# Patient Record
Sex: MALE | Race: WHITE | NOT HISPANIC OR LATINO | Employment: OTHER | URBAN - METROPOLITAN AREA
[De-identification: names, ages, dates, MRNs, and addresses within clinical notes are randomized per-mention and may not be internally consistent; named-entity substitution may affect disease eponyms.]

---

## 2021-07-01 ENCOUNTER — PATIENT MESSAGE (OUTPATIENT)
Dept: ADMINISTRATIVE | Facility: OTHER | Age: 86
End: 2021-07-01

## 2021-10-17 PROBLEM — M48.062 SPINAL STENOSIS, LUMBAR REGION WITH NEUROGENIC CLAUDICATION: Status: ACTIVE | Noted: 2021-10-17

## 2021-11-18 DIAGNOSIS — R94.31 NONSPECIFIC ABNORMAL ELECTROCARDIOGRAM (ECG) (EKG): Primary | ICD-10-CM

## 2021-11-24 ENCOUNTER — HOSPITAL ENCOUNTER (OUTPATIENT)
Dept: RADIOLOGY | Facility: HOSPITAL | Age: 86
Discharge: HOME | End: 2021-11-24
Attending: INTERNAL MEDICINE
Payer: MEDICARE

## 2021-11-24 ENCOUNTER — CLINICAL SUPPORT (OUTPATIENT)
Dept: CARDIOLOGY | Facility: HOSPITAL | Age: 86
End: 2021-11-24
Attending: INTERNAL MEDICINE
Payer: MEDICARE

## 2021-11-24 DIAGNOSIS — R94.31 NONSPECIFIC ABNORMAL ELECTROCARDIOGRAM (ECG) (EKG): ICD-10-CM

## 2021-11-24 PROCEDURE — 78452 HT MUSCLE IMAGE SPECT MULT: CPT

## 2021-11-24 PROCEDURE — 93017 CV STRESS TEST TRACING ONLY: CPT

## 2021-11-24 PROCEDURE — A9500 TC99M SESTAMIBI: HCPCS

## 2021-12-02 LAB
CV STRESS BASE HR: 56 BPM
DIASTOLIC BLOOD PRESSURE: 65 MMHG
NUC REST EJECTION FRACTION: 67
OHS CV CPX 85 PERCENT MAX PREDICTED HEART RATE MALE: 109
OHS CV CPX MAX PREDICTED HEART RATE: 128
OHS CV CPX PATIENT IS FEMALE: 0
OHS CV CPX PATIENT IS MALE: 1
OHS CV CPX PEAK HEAR RATE: 79 BPM
OHS CV CPX PERCENT MAX PREDICTED HEART RATE ACHIEVED: 62
OHS CV CPX RATE PRESSURE PRODUCT PRESENTING: 9800
SYSTOLIC BLOOD PRESSURE: 175 MMHG

## 2022-01-01 ENCOUNTER — HOSPITAL ENCOUNTER (OUTPATIENT)
Dept: RADIOLOGY | Facility: HOSPITAL | Age: 87
Discharge: HOME | End: 2022-09-09
Attending: NURSE PRACTITIONER
Payer: MEDICARE

## 2022-01-01 ENCOUNTER — LAB VISIT (OUTPATIENT)
Dept: LAB | Facility: HOSPITAL | Age: 87
End: 2022-01-01
Attending: INTERNAL MEDICINE
Payer: MEDICARE

## 2022-01-01 DIAGNOSIS — N18.9 CHRONIC KIDNEY DISEASE, UNSPECIFIED: Primary | ICD-10-CM

## 2022-01-01 DIAGNOSIS — R22.31 MASS OF FINGER OF RIGHT HAND: Primary | ICD-10-CM

## 2022-01-01 DIAGNOSIS — R22.31 MASS OF FINGER OF RIGHT HAND: ICD-10-CM

## 2022-01-01 DIAGNOSIS — M79.601 RIGHT ARM PAIN: ICD-10-CM

## 2022-01-01 DIAGNOSIS — R22.31 LOCALIZED SWELLING, MASS, OR LUMP OF UPPER EXTREMITY, RIGHT: ICD-10-CM

## 2022-01-01 LAB
ALBUMIN SERPL BCP-MCNC: 2.9 G/DL (ref 3.5–5.2)
ALP SERPL-CCNC: 35 U/L (ref 55–135)
ALT SERPL W/O P-5'-P-CCNC: 14 U/L (ref 10–44)
ANION GAP SERPL CALC-SCNC: 6 MMOL/L (ref 8–16)
AST SERPL-CCNC: 25 U/L (ref 10–40)
BASOPHILS # BLD AUTO: 0.06 K/UL (ref 0–0.2)
BASOPHILS NFR BLD: 0.9 % (ref 0–1.9)
BILIRUB SERPL-MCNC: 0.3 MG/DL (ref 0.1–1)
BUN SERPL-MCNC: 34 MG/DL (ref 10–30)
CALCIUM SERPL-MCNC: 8.6 MG/DL (ref 8.7–10.5)
CHLORIDE SERPL-SCNC: 101 MMOL/L (ref 95–110)
CHOLEST SERPL-MCNC: 138 MG/DL (ref 120–199)
CHOLEST/HDLC SERPL: 3.5 {RATIO} (ref 2–5)
CO2 SERPL-SCNC: 26 MMOL/L (ref 23–29)
COMPLEXED PSA SERPL-MCNC: 23 NG/ML (ref 0–4)
CREAT SERPL-MCNC: 2.3 MG/DL (ref 0.5–1.4)
DIFFERENTIAL METHOD: ABNORMAL
EOSINOPHIL # BLD AUTO: 0.3 K/UL (ref 0–0.5)
EOSINOPHIL NFR BLD: 4.9 % (ref 0–8)
ERYTHROCYTE [DISTWIDTH] IN BLOOD BY AUTOMATED COUNT: 13.5 % (ref 11.5–14.5)
EST. GFR  (NO RACE VARIABLE): 25.8 ML/MIN/1.73 M^2
GLUCOSE SERPL-MCNC: 113 MG/DL (ref 70–110)
HCT VFR BLD AUTO: 29.2 % (ref 40–54)
HDLC SERPL-MCNC: 40 MG/DL (ref 40–75)
HDLC SERPL: 29 % (ref 20–50)
HGB BLD-MCNC: 9.9 G/DL (ref 14–18)
IMM GRANULOCYTES # BLD AUTO: 0.01 K/UL (ref 0–0.04)
IMM GRANULOCYTES NFR BLD AUTO: 0.1 % (ref 0–0.5)
LDLC SERPL CALC-MCNC: 79.6 MG/DL (ref 63–159)
LYMPHOCYTES # BLD AUTO: 3.3 K/UL (ref 1–4.8)
LYMPHOCYTES NFR BLD: 49.3 % (ref 18–48)
MCH RBC QN AUTO: 30.5 PG (ref 27–31)
MCHC RBC AUTO-ENTMCNC: 33.9 G/DL (ref 32–36)
MCV RBC AUTO: 90 FL (ref 82–98)
MONOCYTES # BLD AUTO: 0.9 K/UL (ref 0.3–1)
MONOCYTES NFR BLD: 12.6 % (ref 4–15)
NEUTROPHILS # BLD AUTO: 2.2 K/UL (ref 1.8–7.7)
NEUTROPHILS NFR BLD: 32.2 % (ref 38–73)
NONHDLC SERPL-MCNC: 98 MG/DL
NRBC BLD-RTO: 0 /100 WBC
PLATELET # BLD AUTO: 274 K/UL (ref 150–450)
PMV BLD AUTO: 10.6 FL (ref 9.2–12.9)
POTASSIUM SERPL-SCNC: 4.4 MMOL/L (ref 3.5–5.1)
PROT SERPL-MCNC: 6.1 G/DL (ref 6–8.4)
RBC # BLD AUTO: 3.25 M/UL (ref 4.6–6.2)
SODIUM SERPL-SCNC: 133 MMOL/L (ref 136–145)
TRIGL SERPL-MCNC: 92 MG/DL (ref 30–150)
WBC # BLD AUTO: 6.76 K/UL (ref 3.9–12.7)

## 2022-01-01 PROCEDURE — 93971 EXTREMITY STUDY: CPT | Mod: TC,RT

## 2022-01-01 PROCEDURE — 84153 ASSAY OF PSA TOTAL: CPT | Mod: GA | Performed by: INTERNAL MEDICINE

## 2022-01-01 PROCEDURE — 85025 COMPLETE CBC W/AUTO DIFF WBC: CPT | Performed by: INTERNAL MEDICINE

## 2022-01-01 PROCEDURE — 80053 COMPREHEN METABOLIC PANEL: CPT | Performed by: INTERNAL MEDICINE

## 2022-01-01 PROCEDURE — 36415 COLL VENOUS BLD VENIPUNCTURE: CPT | Performed by: INTERNAL MEDICINE

## 2022-01-01 PROCEDURE — 80061 LIPID PANEL: CPT | Performed by: INTERNAL MEDICINE

## 2022-02-17 DIAGNOSIS — H71.90: Primary | ICD-10-CM

## 2022-02-18 ENCOUNTER — HOSPITAL ENCOUNTER (OUTPATIENT)
Dept: RADIOLOGY | Facility: HOSPITAL | Age: 87
Discharge: HOME | End: 2022-02-18
Payer: MEDICARE

## 2022-02-18 DIAGNOSIS — H71.90: ICD-10-CM

## 2022-02-18 PROCEDURE — 70480 CT ORBIT/EAR/FOSSA W/O DYE: CPT | Mod: TC

## 2022-03-17 PROBLEM — I10 HYPERTENSION: Status: ACTIVE | Noted: 2022-03-17

## 2022-03-20 PROBLEM — I44.1 WENCKEBACH'S PHENOMENON, HEART BLOCK: Status: ACTIVE | Noted: 2022-03-20

## 2022-03-21 PROBLEM — E63.9 INADEQUATE DIETARY ENERGY INTAKE: Status: ACTIVE | Noted: 2022-03-21

## 2022-03-22 PROBLEM — E87.1 HYPONATREMIA: Status: ACTIVE | Noted: 2022-03-22

## 2022-03-22 PROBLEM — N17.9 ACUTE RENAL FAILURE: Status: ACTIVE | Noted: 2022-03-22

## 2022-03-25 PROBLEM — E87.5 HYPERKALEMIA: Status: ACTIVE | Noted: 2022-03-25

## 2022-03-28 ENCOUNTER — HOSPITAL ENCOUNTER (INPATIENT)
Facility: HOSPITAL | Age: 87
LOS: 15 days | Discharge: HOME HEALTH | DRG: 552 | End: 2022-04-12
Attending: INTERNAL MEDICINE | Admitting: INTERNAL MEDICINE
Payer: MEDICARE

## 2022-03-28 DIAGNOSIS — N17.9 ACUTE RENAL FAILURE, UNSPECIFIED ACUTE RENAL FAILURE TYPE: Primary | ICD-10-CM

## 2022-03-28 DIAGNOSIS — M48.062 SPINAL STENOSIS OF LUMBAR REGION WITH NEUROGENIC CLAUDICATION: ICD-10-CM

## 2022-03-28 DIAGNOSIS — M48.00 SPINAL STENOSIS: ICD-10-CM

## 2022-03-28 PROCEDURE — 11000001 HC ACUTE MED/SURG PRIVATE ROOM

## 2022-03-28 PROCEDURE — 63600175 PHARM REV CODE 636 W HCPCS: Performed by: INTERNAL MEDICINE

## 2022-03-28 PROCEDURE — 25000003 PHARM REV CODE 250: Performed by: INTERNAL MEDICINE

## 2022-03-28 PROCEDURE — 92523 SPEECH SOUND LANG COMPREHEN: CPT | Performed by: SPEECH-LANGUAGE PATHOLOGIST

## 2022-03-28 PROCEDURE — 97162 PT EVAL MOD COMPLEX 30 MIN: CPT

## 2022-03-28 RX ORDER — AMLODIPINE BESYLATE 5 MG/1
5 TABLET ORAL NIGHTLY
Status: DISCONTINUED | OUTPATIENT
Start: 2022-03-28 | End: 2022-04-12 | Stop reason: HOSPADM

## 2022-03-28 RX ORDER — POLYETHYLENE GLYCOL 3350 17 G/17G
17 POWDER, FOR SOLUTION ORAL DAILY
Status: DISCONTINUED | OUTPATIENT
Start: 2022-03-28 | End: 2022-04-12 | Stop reason: HOSPADM

## 2022-03-28 RX ORDER — ASPIRIN 81 MG/1
81 TABLET ORAL DAILY
Status: DISCONTINUED | OUTPATIENT
Start: 2022-03-28 | End: 2022-04-12 | Stop reason: HOSPADM

## 2022-03-28 RX ORDER — CHOLECALCIFEROL (VITAMIN D3) 25 MCG
1000 TABLET ORAL DAILY
Status: DISCONTINUED | OUTPATIENT
Start: 2022-03-28 | End: 2022-04-12 | Stop reason: HOSPADM

## 2022-03-28 RX ORDER — TAMSULOSIN HYDROCHLORIDE 0.4 MG/1
0.4 CAPSULE ORAL 2 TIMES DAILY
Status: DISCONTINUED | OUTPATIENT
Start: 2022-03-28 | End: 2022-04-12 | Stop reason: HOSPADM

## 2022-03-28 RX ORDER — ONDANSETRON 4 MG/1
8 TABLET, ORALLY DISINTEGRATING ORAL EVERY 8 HOURS PRN
Status: DISCONTINUED | OUTPATIENT
Start: 2022-03-28 | End: 2022-04-12 | Stop reason: HOSPADM

## 2022-03-28 RX ORDER — FENOFIBRATE 145 MG/1
145 TABLET, FILM COATED ORAL NIGHTLY
Status: DISCONTINUED | OUTPATIENT
Start: 2022-03-28 | End: 2022-04-12 | Stop reason: HOSPADM

## 2022-03-28 RX ORDER — ENOXAPARIN SODIUM 100 MG/ML
40 INJECTION SUBCUTANEOUS EVERY 24 HOURS
Status: DISCONTINUED | OUTPATIENT
Start: 2022-03-28 | End: 2022-03-28

## 2022-03-28 RX ORDER — TALC
6 POWDER (GRAM) TOPICAL NIGHTLY PRN
Status: DISCONTINUED | OUTPATIENT
Start: 2022-03-28 | End: 2022-04-12 | Stop reason: HOSPADM

## 2022-03-28 RX ORDER — HYDROCODONE BITARTRATE AND ACETAMINOPHEN 5; 325 MG/1; MG/1
1 TABLET ORAL EVERY 4 HOURS PRN
Status: DISCONTINUED | OUTPATIENT
Start: 2022-03-28 | End: 2022-04-12 | Stop reason: HOSPADM

## 2022-03-28 RX ORDER — MUPIROCIN 20 MG/G
OINTMENT TOPICAL 2 TIMES DAILY
Status: DISPENSED | OUTPATIENT
Start: 2022-03-28 | End: 2022-04-02

## 2022-03-28 RX ORDER — MIRTAZAPINE 7.5 MG/1
7.5 TABLET, FILM COATED ORAL NIGHTLY
Status: DISCONTINUED | OUTPATIENT
Start: 2022-03-28 | End: 2022-04-12 | Stop reason: HOSPADM

## 2022-03-28 RX ORDER — ENOXAPARIN SODIUM 100 MG/ML
30 INJECTION SUBCUTANEOUS EVERY 24 HOURS
Status: DISCONTINUED | OUTPATIENT
Start: 2022-03-28 | End: 2022-04-12 | Stop reason: HOSPADM

## 2022-03-28 RX ADMIN — TAMSULOSIN HYDROCHLORIDE 0.4 MG: 0.4 CAPSULE ORAL at 08:03

## 2022-03-28 RX ADMIN — MIRTAZAPINE 7.5 MG: 7.5 TABLET ORAL at 08:03

## 2022-03-28 RX ADMIN — FENOFIBRATE 145 MG: 145 TABLET, FILM COATED ORAL at 08:03

## 2022-03-28 RX ADMIN — AMLODIPINE BESYLATE 5 MG: 5 TABLET ORAL at 08:03

## 2022-03-28 RX ADMIN — ENOXAPARIN SODIUM 30 MG: 30 INJECTION SUBCUTANEOUS at 04:03

## 2022-03-28 RX ADMIN — Medication 6 MG: at 08:03

## 2022-03-28 NOTE — PT/OT/SLP EVAL
Occupational Therapy  Evaluation    Maurice Roy   MRN: [id number]   Admitting Diagnosis: Spinal Stenosis of Lumbar Region with Neurogenic Claudication    OT Date of Treatment: 03/28/22   OT Start Time: 1800  OT Stop Time: 1915  OT Total Time (min): 75 min    Billable Minutes:  Evaluation 75  Total Minutes: 75    Diagnosis: Spinal Stenosis of Lumbar Region with Neurogenic Claudication      Past Medical History:   Diagnosis Date    Arthritis     Cancer     prostate    Hypertension       Past Surgical History:   Procedure Laterality Date    ANKLE SURGERY      right    CHOLECYSTECTOMY      COLONOSCOPY      FACETECTOMY OF VERTEBRA Bilateral 3/16/2022    Procedure: FACETECTOMY, PARTIAL MEDIAL, BILATERAL, L4-5 AND L5-S1;  Surgeon: Anil Hector MD;  Location: [facility] OR;  Service: Orthopedics;  Laterality: Bilateral;    FORAMINOTOMY Bilateral 3/16/2022    Procedure: FORAMINOTOMY, SPINE, BILATERAL, L4-5 AND L5-S1;  Surgeon: Anil Hector MD;  Location: [facility] OR;  Service: Orthopedics;  Laterality: Bilateral;    LAMINOTOMY N/A 3/16/2022    Procedure: LAMINOTOMY S1;  Surgeon: Anil Hector MD;  Location: [facility] OR;  Service: Orthopedics;  Laterality: N/A;    LUMBAR LAMINECTOMY N/A 3/16/2022    Procedure: LAMINECTOMY, SPINE, LUMBAR, OPEN, L4 AND L5;  Surgeon: Anil Hector MD;  Location: [facility] OR;  Service: Orthopedics;  Laterality: N/A;  Will go to CCU Post-op per Dr. LUEVANO    PERIPHERALLY INSERTED CENTRAL CATHETER INSERTION N/A 3/24/2022    Procedure: INSERTION, PICC;  Surgeon: Kathryn Diagnostic Provider;  Location: [facility] OR;  Service: General;  Laterality: N/A;       Referring physician: Dr. Luz Maria Marcus  Date referred to OT: 03/28/22    General Precautions: Standard,    Orthopedic Precautions: spinal precautions  Braces: TLSO    Spiritual, Cultural Beliefs, Episcopal Practices, Values that Affect Care: no     Patient History:  Living Environment  Lives With: child(bairon), adult (Pt lives with his son.)  Living  "Arrangements: house  Home Accessibility: stairs to enter home (2 steps to enter / exit front and back of house.)  Home Layout: Able to live on 1st floor  Stair Railings at Home: outside, present at both sides  Transportation Anticipated: car, drives self, family or friend will provide  Equipment Currently Used at Home: cane, straight, walker, rolling, shower chair    Prior level of function:    Bed Mobility/Transfers: independent  Grooming: independent  Bathing: independent  Upper Body Dressing: independent  Lower Body Dressing: independent  Toileting: independent  Homemaking Responsibilities: Yes  Meal Prep Responsibility: Secondary  Laundry Responsibility: Primary  Cleaning Responsibility: Secondary  Bill Paying/Finance Responsibility: Primary  Shopping Responsibility: Secondary   Responsibility: No  Driving License: Yes  Mode of Transportation: Car  Education: Vocational School  Occupation: Retired  Type of Occupation:   Leisure and Hobbies: Fishing and [place]     Dominant hand: right    Subjective:  Communicated with nurse prior to session.    Chief Complaint: pain and weakness  Patient/Family stated goals: Pt stated, "I want to get back to living a normal life again."    Pain/Comfort  Pain Rating 1: 8/10  Location - Orientation 1: lower  Location 1: back  Pain Addressed 1: Reposition, Cessation of Activity  Pain Rating Post-Intervention 1: 2/10    Objective:  Patient found with: chavarria catheter    Cognitive Exam:  Oriented to: Person, Place, Time and Situation  Follows Commands/attention: Follows two-step commands  Communication: clear/fluent  Memory:  No Deficits noted  Safety awareness/insight to disability: impaired  Coping skills/emotional control: Appropriate to situation    Visual/perceptual:  Impaired  acuity    Physical Exam:  Postural examination/scapula alignment: -       Rounded shoulders  Skin integrity: Wound Incision to back with multiple skin tears to bilateral " forearms  Edema: Moderate bilateral UE's and LE's    Sensation:      -       Intact  light/touch bilateral UE's    Upper Extremity Range of Motion:  Right Upper Extremity: WFL  Left Upper Extremity: WFL    Upper Extremity Strength:  Right Upper Extremity: 3+ / 5 shoulder, 4- to 4 / 5 elbow, forearm, and wrist  Left Upper Extremity: 3+ / 5 shoulder, 4- to 4 / 5 elbow, forearm, and wrist   Strength: (R) 19 lbs; (L) 24 lbs    Fine motor coordination:   -       Intact    Gross motor coordination: WFL    Functional Mobility:  Bed Mobility:   Supine to sit: Minimal Assistance   Sit to supine: Moderate Assistance   Rolling: Minimal Assistance   Scooting: Moderate Assistance    Transfers:   Sit to stand:Moderate Assistance with Rolling Walker .  Bed <> Chair:  Step Transfer with Moderate Assistance with Rolling Walker .  Toilet Transfer:  Pt Step Transfer with Moderate Assistance with Grab bars .  Patient performed shower transfer Step Transfer with Moderate Assistance with Grab bars and shower chair.    Feeding:  Patient performed feeding with Supervision or Set-up Assistance with extra time.    Grooming:  Patient peformed hand washing with Supervision or Set-up Assistance at sitting at sink.  Patient performed face washing with Supervision or Set-up Assistance at sitting at sink.  Patient performed oral hygeine with Supervision or Set-up Assistance at sitting at sink.  Patient performe hair grooming with Supervision or Set-up Assistance at sitting at sink.    Bathing:  Patient performed bathing with Moderate Assistance with grab bar, Handheld shower head and shower chair at Shower.    UE Dressing:  Patient performed UE Dressing with Moderate Assistance with TLSO at Edge of bed.    LE Dressing:  Patient don/doffed socks with Total Assistance, Patient don/doffed shoes with Total Assistance, Patient performed don/doffed adult brief with Maximum Assistance and Patient performed don/doffed pants with Maximum  Assistance    Toileting:  Pt performed toileting with Maximum Assistance with Grab  bar at Commode.    Balance:   Static Sit: FAIR+: Able to take MINIMAL challenges from all directions  Dynamic Sit:  FAIR: Cannot move trunk without losing balance  Static Stand: POOR: Needs MODERATE assist to maintain  Dynamic stand: POOR: Needs MOD (moderate) assist during gait      Patient left supine with call button in reach and nurse notified    Assessment:  Maurice Roy is a 89 y.o. male with a medical diagnosis of Spinal Stenosis of Lumbar Region with Neurogenic Claudication and presents with weakness, impaired functional mobilty, decreased safety awareness, impaired endurance, impaired self care skills, impaired cardiopulmonary response to activity, orthopedic precautions, and impaired skin.    Rehab potential is good    Activity tolerance: Fair    Discharge recommendations: other (see comments) (To be further determined based on progress prior to discharge.)     Equipment recommendations: other (see comments) (To be further determined based on progress prior to discharge.)     GOALS:   Short Term Goals to be met by: 04/04/22     Patient will increase functional independence with ADLs by performing:    UE Dressing with Minimal Assistance.  LE Dressing with Moderate Assistance.  Grooming while seated at sink with Set-up Assistance.  Toileting from toilet with Minimal Assistance for hygiene and clothing management.   Bathing from  shower chair/bench with Minimal Assistance.  Step transfer with Minimal Assistance  Toilet transfer to toilet with Minimal Assistance.  Increased functional strength to 4/5 for bilateral UE's.    Long Term Goals to be met by: 04/11/22     Patient will increase functional independence with ADLs by performing:    Feeding with [place].  UE Dressing with Modified [place].  LE Dressing with Modified [place].  Grooming while seated at sink with Modified [place].  Toileting from  toilet with Modified [place] for hygiene and clothing management.   Bathing from  shower chair/bench with Modified [place].  Step transfer with Modified [place]  Toilet transfer to toilet with Modified [place].  Increased functional strength to 4+/5 for bilateral UE's.    PLAN: Patient to be seen 5 x/week (for 90 min per day for 14 days) to address the above listed problems via self-care/home management, community/work re-entry, therapeutic activities, therapeutic exercises  Plan of Care expires: 04/11/22  Plan of Care reviewed with: patient         03/28/2022

## 2022-03-28 NOTE — PLAN OF CARE
Problem: Adult Inpatient Plan of Care  Goal: Plan of Care Review  Outcome: Ongoing, Progressing  Goal: Patient-Specific Goal (Individualized)  Outcome: Ongoing, Progressing  Goal: Absence of Hospital-Acquired Illness or Injury  Outcome: Ongoing, Progressing  Goal: Optimal Comfort and Wellbeing  Outcome: Ongoing, Progressing  Goal: Readiness for Transition of Care  Outcome: Ongoing, Progressing     Problem: Fluid and Electrolyte Imbalance (Acute Kidney Injury/Impairment)  Goal: Fluid and Electrolyte Balance  Outcome: Ongoing, Progressing     Problem: Oral Intake Inadequate (Acute Kidney Injury/Impairment)  Goal: Optimal Nutrition Intake  Outcome: Ongoing, Progressing     Problem: Renal Function Impairment (Acute Kidney Injury/Impairment)  Goal: Effective Renal Function  Outcome: Ongoing, Progressing     Problem: Impaired Wound Healing  Goal: Optimal Wound Healing  Outcome: Ongoing, Progressing     Problem: Infection  Goal: Absence of Infection Signs and Symptoms  Outcome: Ongoing, Progressing     Problem: Skin Injury Risk Increased  Goal: Skin Health and Integrity  Outcome: Ongoing, Progressing     Problem: Fall Injury Risk  Goal: Absence of Fall and Fall-Related Injury  Outcome: Ongoing, Progressing     Plan of care reviewed with patient and daughter.  Patient eager to get back home at prior level of functioning.

## 2022-03-28 NOTE — PROGRESS NOTES
Pharmacist Renal Dose Adjustment Note    Maurice Roy is a 89 y.o. male being treated with the medication enoxaparin    Patient Data:    Vital Signs (Most Recent):  Temp: 98.6 °F (37 °C) (03/28/22 1434)  Pulse: 92 (03/28/22 1434)  Resp: 20 (03/28/22 1434)  BP: (!) 122/58 (03/28/22 1434)  SpO2: 95 % (03/28/22 1434)   Vital Signs (72h Range):  Temp:  [96.1 °F (35.6 °C)-98.6 °F (37 °C)]   Pulse:  [73-95]   Resp:  [18-89]   BP: (115-187)/(58-83)   SpO2:  [93 %-97 %]      Recent Labs   Lab 03/26/22  0527 03/27/22  0604 03/28/22  0614   CREATININE 1.89* 1.92* 2.00*     Serum creatinine: 2 mg/dL (H) 03/28/22 0614  Estimated creatinine clearance: 23.5 mL/min (A)    Medication:enoxaparin dose: 40 mg frequency q24h will be changed to medication:enoxaparin dose:30 frequency:q24h    Pharmacist's Name: Ruby Pinedo  Pharmacist's Extension: [phone]

## 2022-03-28 NOTE — PLAN OF CARE
[place] - Rehab (Hospital)  Initial Discharge Assessment       Primary Care Provider: Marleni Castillo MD    Admission Diagnosis: Spinal stenosis [M48.00]    Admission Date: 3/28/2022  Expected Discharge Date:     Discharge Barriers Identified: None    Payor: MEDICARE / Plan: MEDICARE PART A & B / Product Type: Government /     Extended Emergency Contact Information  Primary Emergency Contact: Delmy Jacobs  Mobile Phone: [phone]  Relation: Daughter  Secondary Emergency Contact: CAROL JACOBS  Mobile Phone: [phone]  Relation: Grandchild  Preferred language: English   needed? No    Discharge Plan A: Home Health  Discharge Plan B: Home with family      [facility] - [place], [place] - [address]  [address]  [facility] [address]  Phone: [phone] Fax: [phone]      Initial Assessment (most recent)     Adult Discharge Assessment - 03/28/22 1515        Discharge Assessment    Assessment Type Discharge Planning Assessment     Confirmed/corrected address, phone number and insurance Yes     Confirmed Demographics Correct on Facesheet     Source of Information patient;family   Delmy Jacobs (daughter)    When was your last doctors appointment? --   No value- sometime in February 2022 to get clearance before surgery.    Communicated MARCOS with patient/caregiver Date not available/Unable to determine     Reason For Admission Spinal stenosis     Lives With child(bairon), adult   Pt's son Jem lives with him    Facility Arrived From: [facility]     Do you expect to return to your current living situation? Yes     Do you have help at home or someone to help you manage your care at home? Yes     Who are your caregiver(s) and their phone number(s)? Pt does not have a caregiver but has assistance from daughter Delmy if needed.     Prior to hospitilization cognitive status: Alert/Oriented     Current cognitive status: Alert/Oriented     Walking or Climbing  Stairs Difficulty ambulation difficulty, requires equipment     Mobility Management straight cane     Dressing/Bathing Difficulty bathing difficulty, requires equipment     Dressing/Bathing Management shower chair     Home Accessibility not wheelchair accessible     Home Layout Able to live on 1st floor     Equipment Currently Used at Home cane, straight;rollator;walker, rolling;grab bar;shower chair     Readmission within 30 days? No     Patient currently being followed by outpatient case management? No     Do you currently have service(s) that help you manage your care at home? No     Do you take prescription medications? Yes     Do you have any problems affording any of your prescribed medications? No     Is the patient taking medications as prescribed? yes     Who is going to help you get home at discharge? Delmy Vargas     How do you get to doctors appointments? family or friend will provide     Are you on dialysis? No     Do you take coumadin? No     Discharge Plan A Home Health     Discharge Plan B Home with family     DME Needed Upon Discharge  none     Discharge Plan discussed with: Patient;Adult children     Name(s) and Number(s) Delmy Vargas [phone]     Discharge Barriers Identified None                 CM discharge assessment complete with patient and pt's daughter Delmy Vargas. Pt awake, alert, and oriented during the assessment but was [place]. Per Delmy, pt lives in a single story home with his son Kleber with 3 steps to enter with rails. Prior to hospital admission, pt utilized a straight cane for ambulation and was independent with the use of a shower chair for ADLs. At discharge pt's daughter will be taking him to doctors appointments and is able to assist him if needed. Pt and daughter are interested in home health services and don't have a preference on a home health company.  CM contact information was given to daughter. CM will continue to follow and assist as needed.

## 2022-03-28 NOTE — PT/OT/SLP EVAL
PhysicalTherapy   Evaluation    Maurice Roy   MRN: [id number]     PT Received On: 03/28/22  PT Start Time: 1600     PT Stop Time: 1700    PT Total Time (min): 60 min       Billable Minutes:  Evaluation 60  Total Minutes: 60    Diagnosis: <principal problem not specified>  Past Medical History:   Diagnosis Date    Arthritis     Cancer     prostate    Hypertension       Past Surgical History:   Procedure Laterality Date    ANKLE SURGERY      right    CHOLECYSTECTOMY      COLONOSCOPY      FACETECTOMY OF VERTEBRA Bilateral 3/16/2022    Procedure: FACETECTOMY, PARTIAL MEDIAL, BILATERAL, L4-5 AND L5-S1;  Surgeon: Anil Hector MD;  Location: [facility] OR;  Service: Orthopedics;  Laterality: Bilateral;    FORAMINOTOMY Bilateral 3/16/2022    Procedure: FORAMINOTOMY, SPINE, BILATERAL, L4-5 AND L5-S1;  Surgeon: Anil Hector MD;  Location: [facility];  Service: Orthopedics;  Laterality: Bilateral;    LAMINOTOMY N/A 3/16/2022    Procedure: LAMINOTOMY S1;  Surgeon: Anil Hector MD;  Location: [facility];  Service: Orthopedics;  Laterality: N/A;    LUMBAR LAMINECTOMY N/A 3/16/2022    Procedure: LAMINECTOMY, SPINE, LUMBAR, OPEN, L4 AND L5;  Surgeon: Anil Hector MD;  Location: [facility] OR;  Service: Orthopedics;  Laterality: N/A;  Will go to CCU Post-op per Dr. LUEVANO    PERIPHERALLY INSERTED CENTRAL CATHETER INSERTION N/A 3/24/2022    Procedure: INSERTION, PICC;  Surgeon: Kathryn Diagnostic Provider;  Location: [facility] OR;  Service: General;  Laterality: N/A;       Referring physician: Dr Marcus  Date referred to PT: 03-28-22    General Precautions: Standard,    Orthopedic Precautions:     Braces:            Patient History:  Living Arrangements: house  Home Accessibility: stairs to enter home  Stair Railings at Home: outside, present at both sides  Equipment Currently Used at Home: grab bar, cane, straight    DME owned (not currently used):     Previous Level of Function:  Ambulation Skills: needs device  Transfer Skills:  Problem: RESPIRATORY - ADULT  Goal: Achieves optimal ventilation and oxygenation  INTERVENTIONS:  - Assess for changes in respiratory status  - Assess for changes in mentation and behavior  - Position to facilitate oxygenation and minimize respiratory effo needs device  ADL Skills: needs device  Work/Leisure Activity: needs device   pt lives with his son. He is very hard of hearing  Subjective:  Communicated with nurse prior to session.    Chief Complaint: back pain, weakness  Patient goals: to get back to PLOF  Family goals: same    Pain/Comfort  Pain Rating 1: 6/10  Location 1: back    Objective:  Patient found in bed, with      Cognitive Exam:  Oriented to: Person, Place, Time and Situation  Follows Commands/attention: Follows two-step commands  Communication: clear/fluent  Safety awareness/insight to disability: intact    Physical Exam:  Postural examination/scapula alignment:    -       No postural abnormalities identified    Skin integrity: a lot of wounds on both forearms with left forearm weeping a moderate amount  Edema: swelling in arms and legs    Sensation:      -       Intact    Upper Extremity Range of Motion:  Refer to OT evaluation for UE ROM.    Upper Extremity Strength:  Refer to OT evaluation for UE strength.    Lower Extremity Range of Motion:  Right Lower Extremity: WFL  Left Lower Extremity: WFL    Lower Extremity Strength:  Right Lower Extremity: 3+/5  Left Lower Extremity: 3+/5     Fine motor coordination:     -       Intact    Gross motor coordination: WFL    Functional Mobility:    Bed Mobility :   Supine to sit: Minimal Assistance   Sit to supine: Moderate Assistance   Rolling: Minimal Assistance   Scooting: Moderate Assistance    Transfers:  Sit to stand:Moderate Assistance with No Assistive Device    Bed <> Chair:  Stand Pivot with Moderate Assistance with No Assistive Device      Wheelchair:  Pt too tired to propel wc     Gait:  Pt took 4 steps back and forth with therapist support with pt wearing back brace    Stairs:  Not safe to do steps     Balance:   Static Sit: FAIR: Maintains without assist, but unable to take any challenges   Dynamic Sit:  FAIR: Cannot move trunk without losing balance  Static Stand: POOR: Needs MODERATE assist to  maintain  Dynamic stand: POOR: Needs MOD (moderate) assist during gait    Endurance deficit:  poor    Special Tests:      Additional Treatment:      Patient left supine with call button in reach.    Assessment:  Maurice Roy is a 89 y.o. male with a medical diagnosis of <principal problem not specified>. He presents with weakness in both legs, and decreased fxn'l mobility.Rehab potential is good.    Activity tolerance: Fair    Plan: Pt will be seen for 90 min 5-7 days per week for 2 weeks    Discharge recommendations: other (see comments) (to be determined later)     Equipment recommendations:      GOALS:   STGs  1. Pt will be sba with bed mobility  2. Pt will be sba with transfers with rw  3 pt will be sba with rw 100ft  4. Pt sba with wc mobility  5. Pt will go up and down 4 steps with yanet   LTGs  1. Pt will be ind with bed mobility  2. Pt will be ind with transfers  3. Pt will amb supervision for 150 ft   4. Pt will be ind with wc mobility  5. Pt will go up and down 12 steps with sba    PLAN:    Patient to be seen 5 x/week to address the above listed problems via gait training, therapeutic activities, therapeutic exercises, therapeutic groups, neuromuscular re-education, wheelchair management/training  Plan of Care expires: 04/11/22  Plan of Care reviewed with: patient          3/28/2022

## 2022-03-28 NOTE — NURSING
Patient arrived to the unit via wheelchair accompanied by daughter, Delmy. See vital sign flow sheet for admission vital signs.  Patient stable and in no acute distress at this time.  Patient sitting in dining area.

## 2022-03-29 PROBLEM — N13.8 BPH WITH OBSTRUCTION/LOWER URINARY TRACT SYMPTOMS: Status: ACTIVE | Noted: 2022-03-29

## 2022-03-29 PROBLEM — T14.8XXA HEMATOMA: Status: ACTIVE | Noted: 2022-03-29

## 2022-03-29 PROBLEM — N40.1 BPH WITH OBSTRUCTION/LOWER URINARY TRACT SYMPTOMS: Status: ACTIVE | Noted: 2022-03-29

## 2022-03-29 LAB
ALBUMIN SERPL BCP-MCNC: 2.4 G/DL (ref 3.5–5.2)
ALP SERPL-CCNC: 202 U/L (ref 55–135)
ALT SERPL W/O P-5'-P-CCNC: 132 U/L (ref 10–44)
ANION GAP SERPL CALC-SCNC: 4 MMOL/L (ref 8–16)
AST SERPL-CCNC: 147 U/L (ref 10–40)
BASOPHILS # BLD AUTO: 0.09 K/UL (ref 0–0.2)
BASOPHILS NFR BLD: 1.2 % (ref 0–1.9)
BILIRUB SERPL-MCNC: 0.3 MG/DL (ref 0.1–1)
BUN SERPL-MCNC: 50 MG/DL (ref 10–30)
CALCIUM SERPL-MCNC: 8.6 MG/DL (ref 8.7–10.5)
CHLORIDE SERPL-SCNC: 109 MMOL/L (ref 95–110)
CO2 SERPL-SCNC: 23 MMOL/L (ref 23–29)
CREAT SERPL-MCNC: 2 MG/DL (ref 0.5–1.4)
DIFFERENTIAL METHOD: ABNORMAL
EOSINOPHIL # BLD AUTO: 0.2 K/UL (ref 0–0.5)
EOSINOPHIL NFR BLD: 2.5 % (ref 0–8)
ERYTHROCYTE [DISTWIDTH] IN BLOOD BY AUTOMATED COUNT: 16.3 % (ref 11.5–14.5)
EST. GFR  (AFRICAN AMERICAN): 32.5 ML/MIN/1.73 M^2
EST. GFR  (NON AFRICAN AMERICAN): 28.1 ML/MIN/1.73 M^2
GLUCOSE SERPL-MCNC: 87 MG/DL (ref 70–110)
HCT VFR BLD AUTO: 27.1 % (ref 40–54)
HGB BLD-MCNC: 9 G/DL (ref 14–18)
IMM GRANULOCYTES # BLD AUTO: 0.05 K/UL (ref 0–0.04)
IMM GRANULOCYTES NFR BLD AUTO: 0.6 % (ref 0–0.5)
LYMPHOCYTES # BLD AUTO: 1.8 K/UL (ref 1–4.8)
LYMPHOCYTES NFR BLD: 23.6 % (ref 18–48)
MCH RBC QN AUTO: 29.9 PG (ref 27–31)
MCHC RBC AUTO-ENTMCNC: 33.2 G/DL (ref 32–36)
MCV RBC AUTO: 90 FL (ref 82–98)
MONOCYTES # BLD AUTO: 0.9 K/UL (ref 0.3–1)
MONOCYTES NFR BLD: 12 % (ref 4–15)
NEUTROPHILS # BLD AUTO: 4.7 K/UL (ref 1.8–7.7)
NEUTROPHILS NFR BLD: 60.1 % (ref 38–73)
NRBC BLD-RTO: 0 /100 WBC
PLATELET # BLD AUTO: 452 K/UL (ref 150–450)
PMV BLD AUTO: 9.5 FL (ref 9.2–12.9)
POTASSIUM SERPL-SCNC: 5.8 MMOL/L (ref 3.5–5.1)
PROT SERPL-MCNC: 5 G/DL (ref 6–8.4)
RBC # BLD AUTO: 3.01 M/UL (ref 4.6–6.2)
SODIUM SERPL-SCNC: 136 MMOL/L (ref 136–145)
WBC # BLD AUTO: 7.75 K/UL (ref 3.9–12.7)

## 2022-03-29 PROCEDURE — 63600175 PHARM REV CODE 636 W HCPCS: Performed by: INTERNAL MEDICINE

## 2022-03-29 PROCEDURE — 25000003 PHARM REV CODE 250: Performed by: INTERNAL MEDICINE

## 2022-03-29 PROCEDURE — 36415 COLL VENOUS BLD VENIPUNCTURE: CPT | Performed by: INTERNAL MEDICINE

## 2022-03-29 PROCEDURE — 97530 THERAPEUTIC ACTIVITIES: CPT

## 2022-03-29 PROCEDURE — 11000001 HC ACUTE MED/SURG PRIVATE ROOM

## 2022-03-29 PROCEDURE — 97116 GAIT TRAINING THERAPY: CPT

## 2022-03-29 PROCEDURE — 85025 COMPLETE CBC W/AUTO DIFF WBC: CPT | Performed by: INTERNAL MEDICINE

## 2022-03-29 PROCEDURE — 97110 THERAPEUTIC EXERCISES: CPT

## 2022-03-29 PROCEDURE — 97535 SELF CARE MNGMENT TRAINING: CPT

## 2022-03-29 PROCEDURE — 80053 COMPREHEN METABOLIC PANEL: CPT | Performed by: INTERNAL MEDICINE

## 2022-03-29 RX ADMIN — HYDROCODONE BITARTRATE AND ACETAMINOPHEN 1 TABLET: 5; 325 TABLET ORAL at 03:03

## 2022-03-29 RX ADMIN — THERA TABS 1 TABLET: TAB at 03:03

## 2022-03-29 RX ADMIN — TAMSULOSIN HYDROCHLORIDE 0.4 MG: 0.4 CAPSULE ORAL at 09:03

## 2022-03-29 RX ADMIN — Medication 1000 UNITS: at 09:03

## 2022-03-29 RX ADMIN — ENOXAPARIN SODIUM 30 MG: 30 INJECTION SUBCUTANEOUS at 04:03

## 2022-03-29 RX ADMIN — MIRTAZAPINE 7.5 MG: 7.5 TABLET ORAL at 08:03

## 2022-03-29 RX ADMIN — TAMSULOSIN HYDROCHLORIDE 0.4 MG: 0.4 CAPSULE ORAL at 08:03

## 2022-03-29 RX ADMIN — AMLODIPINE BESYLATE 5 MG: 5 TABLET ORAL at 08:03

## 2022-03-29 RX ADMIN — Medication 6 MG: at 08:03

## 2022-03-29 RX ADMIN — POLYETHYLENE GLYCOL (3350) 17 G: 17 POWDER, FOR SOLUTION ORAL at 09:03

## 2022-03-29 RX ADMIN — ASPIRIN 81 MG: 81 TABLET, COATED ORAL at 09:03

## 2022-03-29 RX ADMIN — FENOFIBRATE 145 MG: 145 TABLET, FILM COATED ORAL at 08:03

## 2022-03-29 RX ADMIN — MUPIROCIN: 20 OINTMENT TOPICAL at 09:03

## 2022-03-29 NOTE — HPI
Patient is a 89-year-old male with a history of generalized arthritis prostate cancer hypertension and lower back pain.  The patient has a history of spinal stenosis and chronic lower back related issues.  At an outside facility the patient underwent a L4-L5 laminectomy and a foraminotomy at L4-L5 and S5-L5 S1.  Postoperatively the patient had pain in the back and tenderness at the surgical site.  His lower extremity weakness and neuropathic type symptoms improved after surgery.  Patient was seen by primary care after surgery and he was placed on antibiotics.  He was also started on antiplatelets and anti-platelet agents postoperatively.  Patient did have an episode of urinary tension postoperatively requiring a Nixon catheter placement urologic consultation and initiation of BPH medications.  Postop the patient also had worsening tremors was seen by Neurology had an abnormality on EKG and was seen by Cardiology and had several medication changes.  Patient also has had several electrolyte abnormalities requiring treatment.  Slowly the patient began to stabilize and he began therapy and he was referred to our inpatient rehab unit for close monitoring routine blood work close physician oversight and rehabilitative care.  I evaluated the patient this morning on the exercise bike he is very eager and motivated to complete therapy and return home.  The patient was still functioning at a modified independent functional state and is hoping to get back to goal of return home.  The patient has multiple skin tear is requiring wound treatment as well as a hematoma to the right forearm.  Patient is also having dyspnea with speaking and moderate exertion.  Patient's chart has been reviewed feel is an excellent candidate for our unit.

## 2022-03-29 NOTE — PLAN OF CARE
Problem: Adult Inpatient Plan of Care  Goal: Plan of Care Review  Outcome: Ongoing, Progressing  Goal: Patient-Specific Goal (Individualized)  Outcome: Ongoing, Progressing  Goal: Absence of Hospital-Acquired Illness or Injury  Outcome: Ongoing, Progressing  Goal: Optimal Comfort and Wellbeing  Outcome: Ongoing, Progressing  Goal: Readiness for Transition of Care  Outcome: Ongoing, Progressing     Problem: Fluid and Electrolyte Imbalance (Acute Kidney Injury/Impairment)  Goal: Fluid and Electrolyte Balance  Outcome: Ongoing, Progressing     Problem: Oral Intake Inadequate (Acute Kidney Injury/Impairment)  Goal: Optimal Nutrition Intake  Outcome: Ongoing, Progressing     Problem: Renal Function Impairment (Acute Kidney Injury/Impairment)  Goal: Effective Renal Function  Outcome: Ongoing, Progressing     Problem: Impaired Wound Healing  Goal: Optimal Wound Healing  Outcome: Ongoing, Progressing     Problem: Infection  Goal: Absence of Infection Signs and Symptoms  Outcome: Ongoing, Progressing     Problem: Skin Injury Risk Increased  Goal: Skin Health and Integrity  Outcome: Ongoing, Progressing     Problem: Fall Injury Risk  Goal: Absence of Fall and Fall-Related Injury  Outcome: Ongoing, Progressing

## 2022-03-29 NOTE — H&P
[facility] Medicine  History & Physical    Patient Name: Maurice Roy  MRN: [id number]  Patient Class: IP- Rehab  Admission Date: 3/28/2022  Attending Physician: Freeman Kathleen III, MD  Primary Care Provider: Marleni Castillo MD         Patient information was obtained from patient, past medical records and ER records.     Subjective:     Principal Problem:Spinal stenosis of lumbar region with neurogenic claudication    Chief Complaint:   Chief Complaint   Patient presents with    I had surgery        HPI: POST ADMISSION PHYSICIAN ASSESSMENT AND   REHAB HISTORICAL/PHYSICAL        CHIEF COMPLAINT: I had surgery on my back    PRIMARY REHAB IMPAIRMENT GROUP: Orthopedic / Other Orthopaedic    ETIOLOGIC DIAGNOSIS:  Spinal stenosis of lumbar region with neurogenic claudication    SECONDARY AND RELATED DIAGNOSES:  Anemia, arthritis, debility, decrease in mobility, decrease in physical functioning, edema, falls, headache, hard of hearing, hypertension, hyponatremia, pain, safety, weakness.    CO-MORBIDITIES PRESENT ON ADMISSION:  Wenckebach phenomenon, heart block, acute renal failure.    Risk:  Patient is at high risk for progressive decline in function progressive muscle weakness muscle atrophy joint stiffness and contractures.  Patient is at risk for falls and more serious injury.  Patient is at risk for fatigue imbalance head injury stroke mi worsening nutrition uncontrolled pain nausea vomiting PE DVT sepsis prolonged nursing home admission and death    Patient is a 89-year-old male with a history of generalized arthritis prostate cancer hypertension and lower back pain.  The patient has a history of spinal stenosis and chronic lower back related issues.  At an outside facility the patient underwent a L4-L5 laminectomy and a foraminotomy at L4-L5 and S5-L5 S1.  Postoperatively the patient had pain in the back and tenderness at the surgical site.  His lower extremity weakness and  neuropathic type symptoms improved after surgery.  Patient was seen by primary care after surgery and he was placed on antibiotics.  He was also started on antiplatelets and anti-platelet agents postoperatively.  Patient did have an episode of urinary tension postoperatively requiring a Nixon catheter placement urologic consultation and initiation of BPH medications.  Postop the patient also had worsening tremors was seen by Neurology had an abnormality on EKG and was seen by Cardiology and had several medication changes.  Patient also has had several electrolyte abnormalities requiring treatment.  Slowly the patient began to stabilize and he began therapy and he was referred to our inpatient rehab unit for close monitoring routine blood work close physician oversight and rehabilitative care.  I evaluated the patient this morning on the exercise bike he is very eager and motivated to complete therapy and return home.  The patient was still functioning at a modified independent functional state and is hoping to get back to goal of return home.  The patient has multiple skin tear is requiring wound treatment as well as a hematoma to the right forearm.  Patient is also having dyspnea with speaking and moderate exertion.  Patient's chart has been reviewed feel is an excellent candidate for our unit.        Past Medical History:   Diagnosis Date    Arthritis     Cancer     prostate    Hypertension        Past Surgical History:   Procedure Laterality Date    ANKLE SURGERY      right    CHOLECYSTECTOMY      COLONOSCOPY      FACETECTOMY OF VERTEBRA Bilateral 3/16/2022    Procedure: FACETECTOMY, PARTIAL MEDIAL, BILATERAL, L4-5 AND L5-S1;  Surgeon: Anil Hector MD;  Location: [facility] OR;  Service: Orthopedics;  Laterality: Bilateral;    FORAMINOTOMY Bilateral 3/16/2022    Procedure: FORAMINOTOMY, SPINE, BILATERAL, L4-5 AND L5-S1;  Surgeon: Anil Hector MD;  Location: [facility] OR;  Service: Orthopedics;  Laterality:  Bilateral;    LAMINOTOMY N/A 3/16/2022    Procedure: LAMINOTOMY S1;  Surgeon: Anil Hector MD;  Location: [facility] OR;  Service: Orthopedics;  Laterality: N/A;    LUMBAR LAMINECTOMY N/A 3/16/2022    Procedure: LAMINECTOMY, SPINE, LUMBAR, OPEN, L4 AND L5;  Surgeon: Anil Hector MD;  Location: [facility] OR;  Service: Orthopedics;  Laterality: N/A;  Will go to CCU Post-op per Dr. LUEVANO    PERIPHERALLY INSERTED CENTRAL CATHETER INSERTION N/A 3/24/2022    Procedure: INSERTION, PICC;  Surgeon: Kathryn Diagnostic Provider;  Location: [facility] OR;  Service: General;  Laterality: N/A;       Review of patient's allergies indicates:  No Known Allergies    Current Facility-Administered Medications on File Prior to Encounter   Medication    [DISCONTINUED] 0.9%  NaCl infusion (for blood administration)    [DISCONTINUED] acetaminophen tablet 650 mg    [DISCONTINUED] amLODIPine tablet 10 mg    [DISCONTINUED] bisacodyL suppository 10 mg    [DISCONTINUED] finasteride tablet 5 mg    [DISCONTINUED] hydrALAZINE tablet 50 mg    [DISCONTINUED] HYDROcodone-acetaminophen 5-325 mg per tablet 1 tablet    [DISCONTINUED] HYDROmorphone injection 0.5 mg    [DISCONTINUED] methocarbamoL tablet 500 mg    [DISCONTINUED] metoprolol tartrate (LOPRESSOR) split tablet 12.5 mg    [DISCONTINUED] mirtazapine tablet 7.5 mg    [DISCONTINUED] ondansetron injection 4 mg    [DISCONTINUED] polyethylene glycol packet 17 g    [DISCONTINUED] sodium chloride 0.65 % nasal spray 1 spray    [DISCONTINUED] sodium chloride 0.9% flush 10 mL    [DISCONTINUED] sodium chloride 0.9% flush 10 mL    [DISCONTINUED] tamsulosin 24 hr capsule 0.8 mg     Current Outpatient Medications on File Prior to Encounter   Medication Sig    amLODIPine (NORVASC) 5 MG tablet Take 5 mg by mouth every evening.    aspirin (ECOTRIN) 81 MG EC tablet Take 81 mg by mouth once daily.    aspirin/caffeine (BACK AND BODY PAIN RELIEVER ORAL) Take by mouth.    cholecalciferol, vitamin D3,  (VITAMIN D3) 25 mcg (1,000 unit) capsule Take 1,000 Units by mouth once daily.    fenofibrate (TRICOR) 145 MG tablet Take 145 mg by mouth every evening.    folic acid/multivit-min/lutein (CENTRUM SILVER ORAL) Take by mouth.    HYDROcodone-acetaminophen (NORCO) 5-325 mg per tablet Take 1 tablet by mouth every 4 (four) hours as needed for Pain.    mirtazapine (REMERON) 7.5 MG Tab Take 7.5 mg by mouth every evening.    nebivoloL (BYSTOLIC) 20 mg Tab Take by mouth once daily.    sulfamethoxazole-trimethoprim 800-160mg (BACTRIM DS) 800-160 mg Tab Take 1 tablet by mouth 2 (two) times daily. for 14 days    tamsulosin (FLOMAX) 0.4 mg Cap Take by mouth 2 (two) times a day.    VITAMIN A ORAL Take by mouth.     Family History       Problem Relation (Age of Onset)    Heart disease Mother, Father    Hypertension Mother, Father          Tobacco Use    Smoking status: Former Smoker    Smokeless tobacco: Former User    Tobacco comment: stopped 30 years ago   Substance and Sexual Activity    Alcohol use: Not Currently     Alcohol/week: 5.0 standard drinks     Types: 5 Cans of beer per week    Drug use: Never    Sexual activity: Not Currently     Review of Systems   Constitutional:  Positive for activity change, appetite change and fatigue. Negative for chills and fever.   HENT:  Negative for congestion, drooling, ear pain, mouth sores, nosebleeds, sinus pressure, sinus pain and sore throat.    Eyes:  Negative for discharge, itching and visual disturbance.   Respiratory:  Positive for shortness of breath. Negative for apnea, cough, chest tightness and wheezing.    Cardiovascular:  Positive for leg swelling. Negative for chest pain and palpitations.   Gastrointestinal:  Positive for constipation. Negative for abdominal distention, abdominal pain, blood in stool, diarrhea, nausea and vomiting.   Endocrine: Positive for cold intolerance. Negative for heat intolerance and polyphagia.   Genitourinary:  Positive for  difficulty urinating. Negative for dysuria, flank pain and frequency.   Musculoskeletal:  Positive for arthralgias and back pain. Negative for myalgias.   Skin:  Negative for rash.   Neurological:  Positive for tremors and weakness. Negative for dizziness, seizures, syncope, facial asymmetry, speech difficulty and headaches.   Hematological:  Negative for adenopathy.   Psychiatric/Behavioral:  Negative for agitation, confusion and hallucinations. The patient is not nervous/anxious.    Objective:     Vital Signs (Most Recent):  Temp: 97.2 °F (36.2 °C) (03/29/22 0601)  Pulse: 89 (03/29/22 0601)  Resp: 18 (03/29/22 0601)  BP: (!) 172/70 (03/29/22 0601)  SpO2: 95 % (03/29/22 0601)   Vital Signs (24h Range):  Temp:  [97.2 °F (36.2 °C)-98.6 °F (37 °C)] 97.2 °F (36.2 °C)  Pulse:  [89-92] 89  Resp:  [18-20] 18  SpO2:  [95 %] 95 %  BP: (122-172)/(58-70) 172/70     Weight: 80.9 kg (178 lb 4.8 oz)  Body mass index is 28.78 kg/m².    Physical Exam  Constitutional:       General: He is awake. He is not in acute distress.     Appearance: Normal appearance. He is ill-appearing. He is not toxic-appearing or diaphoretic.   HENT:      Head: Normocephalic and atraumatic.      Nose: Nose normal. No congestion or rhinorrhea.      Mouth/Throat:      Mouth: Mucous membranes are moist.      Pharynx: Oropharynx is clear. No oropharyngeal exudate or posterior oropharyngeal erythema.   Eyes:      General: No scleral icterus.        Right eye: No discharge.         Left eye: No discharge.      Extraocular Movements: Extraocular movements intact.      Pupils: Pupils are equal, round, and reactive to light.   Neck:      Thyroid: No thyroid mass or thyromegaly.      Vascular: No carotid bruit.      Meningeal: Brudzinski's sign and Kernig's sign absent.   Cardiovascular:      Rate and Rhythm: Normal rate and regular rhythm.      Chest Wall: PMI is not displaced. No thrill.      Pulses: Normal pulses.      Heart sounds: Normal heart sounds. No  murmur heard.    No friction rub. No gallop.   Pulmonary:      Effort: Pulmonary effort is normal. No tachypnea, accessory muscle usage, prolonged expiration or respiratory distress.      Breath sounds: Normal breath sounds. No stridor or decreased air movement. No wheezing, rhonchi or rales.   Chest:      Chest wall: No tenderness.   Abdominal:      General: Bowel sounds are normal. There is no distension.      Palpations: Abdomen is soft. There is no hepatomegaly, splenomegaly or mass.      Tenderness: There is no abdominal tenderness. There is no right CVA tenderness, left CVA tenderness, guarding or rebound.      Hernia: No hernia is present.   Musculoskeletal:         General: No swelling, tenderness, deformity or signs of injury.      Cervical back: Normal range of motion and neck supple. No rigidity. No muscular tenderness.      Right lower leg: Edema present.      Left lower leg: Edema present.      Comments: Brace in place (L-Spine)   Lymphadenopathy:      Cervical: No cervical adenopathy.   Skin:     General: Skin is warm.      Capillary Refill: Capillary refill takes less than 2 seconds.      Coloration: Skin is not cyanotic, jaundiced or pale.      Findings: Bruising, erythema and lesion present. No petechiae or rash.   Neurological:      Mental Status: He is alert and oriented to person, place, and time.      Cranial Nerves: No cranial nerve deficit, dysarthria or facial asymmetry.      Motor: Weakness present. No tremor.      Gait: Gait abnormal.   Psychiatric:         Mood and Affect: Mood normal. Mood is not anxious or depressed. Affect is not flat.         Speech: Speech is not rapid and pressured or slurred.         Behavior: Behavior normal. Behavior is not agitated, aggressive or combative.         Thought Content: Thought content normal. Thought content is not paranoid or delusional.         Cognition and Memory: Cognition is not impaired. Memory is not impaired.         Judgment: Judgment  normal.         CRANIAL NERVES     CN III, IV, VI   Pupils are equal, round, and reactive to light.     Significant Labs: All pertinent labs within the past 24 hours have been reviewed.    Significant Imaging: I have reviewed all pertinent imaging results/findings within the past 24 hours.    Assessment/Plan:     * Spinal stenosis of lumbar region with neurogenic claudication  Patient has several skin issues and wounds that need addressing.  For the hematoma on his right forearm we will compress for now but he may need surgical debridement.  Nursing will ask Wound Care to assist with his other skin tears and skin breakdown.  Patient is very motivated to improve and return home.  Patient is an excellent candidate for inpatient rehab unit he needs multimodal physical therapy occupational therapy skilled nursing and dietary.  Patient needs close physician oversight and routine monitoring of his renal function and other electrolytes.  I do not feel this could be met in another outpatient or skilled nursing facility setting.    ESTIMATED LENGTH OF STAY:  The patient's estimated length of stay is 14 days and she is expected to be able to be discharged to home with family .    REHABILITATION PLAN/GOALS  The patient is being admitted to a comprehensive acute inpatient rehabilitation program consisting of at least 3 hours of combined physical ( 1.5hrs./day, 5 days a week), and occupational therapy (1.5 hrs. A day, 5 days a week),speech therapy services if necessary, 24-hour skilled rehabilitation nursing care, and close supervision of a physician with special training and experience in rehabilitation medicine. Patient's prognosis for significant practical improvement within a reasonable period of time appears good . Given the patient's complex medical condition and risk of further medical complications, rehabilitation services could not be safely provided at a lower level of care such as a skilled nursing facility.                     1. Physical Therapy to Evaluate and Treat, Work on bed mobility, Assist with transfers, Strength, Gait, Fall Prevention, Balance and Modalities as Needed   2. Occupational Therapy to ADL Retraining, Functional Transfer Training, Therapeutic Activity and Exercises, Neuromuscular Re-education, Manual Therapy, Use of Adaptive Equipment and Retraining, Safety Awareness and Fall Prevention and Home Modification   3. Speech Pathology to Make recommendations for a safe diet and Evaluate and Treat Cognitive linguistic deficits   4. Specialized 24-hour Rehabilitation Nursing Care to Protection of Skin and Soft Tissue, Assisting with continence, Bowel and bladder training, Neurological checks, Medicine administration and medicine and management, Wound care and Fall protection and general encouragement   5. Dietary to educate and optimize diet.   6. Social Work/Case management to assist with discharge planning activities, acquisition of durable medical equipment, and ordering of follow up services as necessary.    The services provided in the acute medical rehab setting are under my supervision 24 hours a day. These services are necessary for this patient to achieve the most appropriate functional outcome and to determine the most appropriate discharge disposition.  I have reviewed the treatment plan with the patient and answered all of her questions, discussed goals and expectation.    REVIEW OF PRE-SCREEN ASSESSMENT  I have reviewed the patient's preadmission screen including her medical and functional status and compared it with her status upon arrival to the rehab unit and see no change . Acute rehab services are still necessary and appropriate for this patient to achieve the best functional outcome while determining the most appropriate discharge disposition and services. The patient will require close medical management of multiple medical co-morbidities including as noted above .      Hematoma        BPH with  obstruction/lower urinary tract symptoms        Hyperkalemia        Hyponatremia        Acute renal failure        Wenckebach's phenomenon, heart block        Hypertension          VTE Risk Mitigation (From admission, onward)         Ordered     enoxaparin injection 30 mg  Daily         03/28/22 1444     IP VTE HIGH RISK PATIENT  Once         03/28/22 1432     Place sequential compression device  Until discontinued         03/28/22 1432                   Freeman Kathleen III, MD  Department of Hospital Medicine   [facility] ([facility])

## 2022-03-29 NOTE — ASSESSMENT & PLAN NOTE
Patient has several skin issues and wounds that need addressing.  For the hematoma on his right forearm we will compress for now but he may need surgical debridement.  Nursing will ask Wound Care to assist with his other skin tears and skin breakdown.  Patient is very motivated to improve and return home.  Patient is an excellent candidate for inpatient rehab unit he needs multimodal physical therapy occupational therapy skilled nursing and dietary.  Patient needs close physician oversight and routine monitoring of his renal function and other electrolytes.  I do not feel this could be met in another outpatient or skilled nursing facility setting.    ESTIMATED LENGTH OF STAY:  The patient's estimated length of stay is 14 days and she is expected to be able to be discharged to home with family .    REHABILITATION PLAN/GOALS  The patient is being admitted to a comprehensive acute inpatient rehabilitation program consisting of at least 3 hours of combined physical ( 1.5hrs./day, 5 days a week), and occupational therapy (1.5 hrs. A day, 5 days a week),speech therapy services if necessary, 24-hour skilled rehabilitation nursing care, and close supervision of a physician with special training and experience in rehabilitation medicine. Patient's prognosis for significant practical improvement within a reasonable period of time appears good . Given the patient's complex medical condition and risk of further medical complications, rehabilitation services could not be safely provided at a lower level of care such as a skilled nursing facility.                    1. Physical Therapy to Evaluate and Treat, Work on bed mobility, Assist with transfers, Strength, Gait, Fall Prevention, Balance and Modalities as Needed   2. Occupational Therapy to ADL Retraining, Functional Transfer Training, Therapeutic Activity and Exercises, Neuromuscular Re-education, Manual Therapy, Use of Adaptive Equipment and Retraining, Safety Awareness  and Fall Prevention and Home Modification   3. Speech Pathology to Make recommendations for a safe diet and Evaluate and Treat Cognitive linguistic deficits   4. Specialized 24-hour Rehabilitation Nursing Care to Protection of Skin and Soft Tissue, Assisting with continence, Bowel and bladder training, Neurological checks, Medicine administration and medicine and management, Wound care and Fall protection and general encouragement   5. Dietary to educate and optimize diet.   6. Social Work/Case management to assist with discharge planning activities, acquisition of durable medical equipment, and ordering of follow up services as necessary.    The services provided in the acute medical rehab setting are under my supervision 24 hours a day. These services are necessary for this patient to achieve the most appropriate functional outcome and to determine the most appropriate discharge disposition.  I have reviewed the treatment plan with the patient and answered all of her questions, discussed goals and expectation.    REVIEW OF PRE-SCREEN ASSESSMENT  I have reviewed the patient's preadmission screen including her medical and functional status and compared it with her status upon arrival to the rehab unit and see no change . Acute rehab services are still necessary and appropriate for this patient to achieve the best functional outcome while determining the most appropriate discharge disposition and services. The patient will require close medical management of multiple medical co-morbidities including as noted above .

## 2022-03-29 NOTE — PT/OT/SLP PROGRESS
Physical Therapy         Treatment        Maurice Roy   MRN: [id number]     PT Received On: 03/29/22  Total Time (min): 90      Time start 1015  Time stop 1145  Billable Minutes:  Gait [id number], Therapeutic Activity 30 and Therapeutic Exercise 30  Total Minutes: 90  90 min ind  Treatment Type: Treatment                General Precautions: Standard,    Orthopedic Precautions:     Braces:           Subjective:  Communicated with nurse prior to session.         Objective:  Patient found  In bed, with      Functional Mobility:  Bed Mobility:   Supine to sit: Minimal Assistance   Sit to supine: Moderate Assistance   Rolling: Minimal Assistance   Scooting: Moderate Assistance    Balance:   Static Sit: FAIR: Maintains without assist, but unable to take any challenges   Dynamic Sit:  FAIR: Cannot move trunk without losing balance  Static Stand: POOR: Needs MODERATE assist to maintain  Dynamic stand: POOR: Needs MOD (moderate) assist during gait    Transfer Training:  Sit to stand:Moderate Assistance with Rolling Walker    Bed <> Chair:  Step Transfer with Moderate Assistance with Rolling Walker      Wheelchair Training:  Pt propelled wc 50 ft sba    Gait Training:  Pt amb 50ft with a rw cga    Stair Training:  No steps yet      Additional Treatment:  Pt performed 3 sets 5-8 reps sit to stand with moda. Pt performed AROM ex 3 sets 15 reps    Activity Tolerance:  Patient tolerated treatment well    Patient left supine with call button in reach.    Assessment:  Maurice Roy is a 89 y.o. male with a medical diagnosis of Spinal stenosis of lumbar region with neurogenic claudication. He presents with better gait endurance today.    Rehab potential is good.    Activity tolerance: Good    Discharge recommendations: Discharge Facility/Level of Care Needs: other (see comments) (to be determined later)     Equipment recommendations:       GOALS:   Multidisciplinary Problems     Physical Therapy Goals     Not on file                 PLAN:    Patient to be seen 5 x/week  to address the above listed problems via therapeutic activities, gait training, wheelchair management/training, therapeutic exercises, therapeutic groups, neuromuscular re-education  Plan of Care expires: 04/11/22  Plan of Care reviewed with: patient         3/29/2022

## 2022-03-29 NOTE — SUBJECTIVE & OBJECTIVE
Past Medical History:   Diagnosis Date    Arthritis     Cancer     prostate    Hypertension        Past Surgical History:   Procedure Laterality Date    ANKLE SURGERY      right    CHOLECYSTECTOMY      COLONOSCOPY      FACETECTOMY OF VERTEBRA Bilateral 3/16/2022    Procedure: FACETECTOMY, PARTIAL MEDIAL, BILATERAL, L4-5 AND L5-S1;  Surgeon: Anil Hector MD;  [facility];  Service: Orthopedics;  Laterality: Bilateral;    FORAMINOTOMY Bilateral 3/16/2022    Procedure: FORAMINOTOMY, SPINE, BILATERAL, L4-5 AND L5-S1;  Surgeon: nAil Hector MD;  [facility];  Service: Orthopedics;  Laterality: Bilateral;    LAMINOTOMY N/A 3/16/2022    Procedure: LAMINOTOMY S1;  Surgeon: Anil Hector MD;  [facility];  Service: Orthopedics;  Laterality: N/A;    LUMBAR LAMINECTOMY N/A 3/16/2022    Procedure: LAMINECTOMY, SPINE, LUMBAR, OPEN, L4 AND L5;  Surgeon: Anil Hector MD;  [facility];  Service: Orthopedics;  Laterality: N/A;  Will go to CCU Post-op per Dr. LUEVANO    PERIPHERALLY INSERTED CENTRAL CATHETER INSERTION N/A 3/24/2022    Procedure: INSERTION, PICC;  Surgeon: Kathryn Diagnostic Provider;  [facility];  Service: General;  Laterality: N/A;       Review of patient's allergies indicates:  No Known Allergies    Current Facility-Administered Medications on File Prior to Encounter   Medication    [DISCONTINUED] 0.9%  NaCl infusion (for blood administration)    [DISCONTINUED] acetaminophen tablet 650 mg    [DISCONTINUED] amLODIPine tablet 10 mg    [DISCONTINUED] bisacodyL suppository 10 mg    [DISCONTINUED] finasteride tablet 5 mg    [DISCONTINUED] hydrALAZINE tablet 50 mg    [DISCONTINUED] HYDROcodone-acetaminophen 5-325 mg per tablet 1 tablet    [DISCONTINUED] HYDROmorphone injection 0.5 mg    [DISCONTINUED] methocarbamoL tablet 500 mg    [DISCONTINUED] metoprolol tartrate (LOPRESSOR) split tablet 12.5 mg    [DISCONTINUED] mirtazapine tablet 7.5 mg    [DISCONTINUED]  ondansetron injection 4 mg    [DISCONTINUED] polyethylene glycol packet 17 g    [DISCONTINUED] sodium chloride 0.65 % nasal spray 1 spray    [DISCONTINUED] sodium chloride 0.9% flush 10 mL    [DISCONTINUED] sodium chloride 0.9% flush 10 mL    [DISCONTINUED] tamsulosin 24 hr capsule 0.8 mg     Current Outpatient Medications on File Prior to Encounter   Medication Sig    amLODIPine (NORVASC) 5 MG tablet Take 5 mg by mouth every evening.    aspirin (ECOTRIN) 81 MG EC tablet Take 81 mg by mouth once daily.    aspirin/caffeine (BACK AND BODY PAIN RELIEVER ORAL) Take by mouth.    cholecalciferol, vitamin D3, (VITAMIN D3) 25 mcg (1,000 unit) capsule Take 1,000 Units by mouth once daily.    fenofibrate (TRICOR) 145 MG tablet Take 145 mg by mouth every evening.    folic acid/multivit-min/lutein (CENTRUM SILVER ORAL) Take by mouth.    HYDROcodone-acetaminophen (NORCO) 5-325 mg per tablet Take 1 tablet by mouth every 4 (four) hours as needed for Pain.    mirtazapine (REMERON) 7.5 MG Tab Take 7.5 mg by mouth every evening.    nebivoloL (BYSTOLIC) 20 mg Tab Take by mouth once daily.    sulfamethoxazole-trimethoprim 800-160mg (BACTRIM DS) 800-160 mg Tab Take 1 tablet by mouth 2 (two) times daily. for 14 days    tamsulosin (FLOMAX) 0.4 mg Cap Take by mouth 2 (two) times a day.    VITAMIN A ORAL Take by mouth.     Family History       Problem Relation (Age of Onset)    Heart disease Mother, Father    Hypertension Mother, Father          Tobacco Use    Smoking status: Former Smoker    Smokeless tobacco: Former User    Tobacco comment: stopped 30 years ago   Substance and Sexual Activity    Alcohol use: Not Currently     Alcohol/week: 5.0 standard drinks     Types: 5 Cans of beer per week    Drug use: Never    Sexual activity: Not Currently     Review of Systems   Constitutional:  Positive for activity change, appetite change and fatigue. Negative for chills and fever.   HENT:  Negative for congestion,  drooling, ear pain, mouth sores, nosebleeds, sinus pressure, sinus pain and sore throat.    Eyes:  Negative for discharge, itching and visual disturbance.   Respiratory:  Positive for shortness of breath. Negative for apnea, cough, chest tightness and wheezing.    Cardiovascular:  Positive for leg swelling. Negative for chest pain and palpitations.   Gastrointestinal:  Positive for constipation. Negative for abdominal distention, abdominal pain, blood in stool, diarrhea, nausea and vomiting.   Endocrine: Positive for cold intolerance. Negative for heat intolerance and polyphagia.   Genitourinary:  Positive for difficulty urinating. Negative for dysuria, flank pain and frequency.   Musculoskeletal:  Positive for arthralgias and back pain. Negative for myalgias.   Skin:  Negative for rash.   Neurological:  Positive for tremors and weakness. Negative for dizziness, seizures, syncope, facial asymmetry, speech difficulty and headaches.   Hematological:  Negative for adenopathy.   Psychiatric/Behavioral:  Negative for agitation, confusion and hallucinations. The patient is not nervous/anxious.    Objective:     Vital Signs (Most Recent):  Temp: 97.2 °F (36.2 °C) (03/29/22 0601)  Pulse: 89 (03/29/22 0601)  Resp: 18 (03/29/22 0601)  BP: (!) 172/70 (03/29/22 0601)  SpO2: 95 % (03/29/22 0601)   Vital Signs (24h Range):  Temp:  [97.2 °F (36.2 °C)-98.6 °F (37 °C)] 97.2 °F (36.2 °C)  Pulse:  [89-92] 89  Resp:  [18-20] 18  SpO2:  [95 %] 95 %  BP: (122-172)/(58-70) 172/70     Weight: 80.9 kg (178 lb 4.8 oz)  Body mass index is 28.78 kg/m².    Physical Exam  Constitutional:       General: He is awake. He is not in acute distress.     Appearance: Normal appearance. He is ill-appearing. He is not toxic-appearing or diaphoretic.   HENT:      Head: Normocephalic and atraumatic.      Nose: Nose normal. No congestion or rhinorrhea.      Mouth/Throat:      Mouth: Mucous membranes are moist.      Pharynx: Oropharynx is clear. No  oropharyngeal exudate or posterior oropharyngeal erythema.   Eyes:      General: No scleral icterus.        Right eye: No discharge.         Left eye: No discharge.      Extraocular Movements: Extraocular movements intact.      Pupils: Pupils are equal, round, and reactive to light.   Neck:      Thyroid: No thyroid mass or thyromegaly.      Vascular: No carotid bruit.      Meningeal: Brudzinski's sign and Kernig's sign absent.   Cardiovascular:      Rate and Rhythm: Normal rate and regular rhythm.      Chest Wall: PMI is not displaced. No thrill.      Pulses: Normal pulses.      Heart sounds: Normal heart sounds. No murmur heard.    No friction rub. No gallop.   Pulmonary:      Effort: Pulmonary effort is normal. No tachypnea, accessory muscle usage, prolonged expiration or respiratory distress.      Breath sounds: Normal breath sounds. No stridor or decreased air movement. No wheezing, rhonchi or rales.   Chest:      Chest wall: No tenderness.   Abdominal:      General: Bowel sounds are normal. There is no distension.      Palpations: Abdomen is soft. There is no hepatomegaly, splenomegaly or mass.      Tenderness: There is no abdominal tenderness. There is no right CVA tenderness, left CVA tenderness, guarding or rebound.      Hernia: No hernia is present.   Musculoskeletal:         General: No swelling, tenderness, deformity or signs of injury.      Cervical back: Normal range of motion and neck supple. No rigidity. No muscular tenderness.      Right lower leg: Edema present.      Left lower leg: Edema present.      Comments: Brace in place (L-Spine)   Lymphadenopathy:      Cervical: No cervical adenopathy.   Skin:     General: Skin is warm.      Capillary Refill: Capillary refill takes less than 2 seconds.      Coloration: Skin is not cyanotic, jaundiced or pale.      Findings: Bruising, erythema and lesion present. No petechiae or rash.   Neurological:      Mental Status: He is alert and oriented to person,  place, and time.      Cranial Nerves: No cranial nerve deficit, dysarthria or facial asymmetry.      Motor: Weakness present. No tremor.      Gait: Gait abnormal.   Psychiatric:         Mood and Affect: Mood normal. Mood is not anxious or depressed. Affect is not flat.         Speech: Speech is not rapid and pressured or slurred.         Behavior: Behavior normal. Behavior is not agitated, aggressive or combative.         Thought Content: Thought content normal. Thought content is not paranoid or delusional.         Cognition and Memory: Cognition is not impaired. Memory is not impaired.         Judgment: Judgment normal.         CRANIAL NERVES     CN III, IV, VI   Pupils are equal, round, and reactive to light.     Significant Labs: All pertinent labs within the past 24 hours have been reviewed.    Significant Imaging: I have reviewed all pertinent imaging results/findings within the past 24 hours.

## 2022-03-29 NOTE — PT/OT/SLP PROGRESS
Occupational Therapy  Treatment    Maurice Roy   MRN: [id number]   Admitting Diagnosis: Spinal stenosis of lumbar region with neurogenic claudication    OT Date of Treatment: 03/29/22   AM Start Time: 1230  AM End Time: 1300  PM Start Time: 1330  PM End Time: 1430  Treatment Type: Individual 60 and Concurrent 30  Total Time (min): 90 min      Billable Minutes:  Self Care/Home Management 30, Therapeutic Activity 15 and Therapeutic Exercise 45  Total Minutes: 90    General Precautions: Standard,    Orthopedic Precautions: spinal precautions  Braces:      Spiritual, Cultural Beliefs, Gnosticism Practices, Values that Affect Care: no    Subjective:  Communicated with nurse prior to session.    Pain/Comfort  Pain Rating 1: 3/10  Location - Orientation 1: lower  Location 1: back  Pain Addressed 1: Reposition  Pain Rating Post-Intervention 1: 3/10    Objective:  Patient found with: chavarria catheter. Pt was cooperative and motivated with minimal verbal encouragement while exhibiting positive affect. He participated in functional transfer retraining throughout both sessions to / from wheelchair, toilet, and chair emphasizing fall prevention providing extra time with repetition requiring mod assist secondary to lifting assist and steadying with continuous verbal and tactile cueing for safety awareness and technique utilizing RW. Pt participated in ADL retraining regarding toileting emphasizing fall prevention and safety awareness in regards to sustaining Spinal Precautions providing extra time requiring max assist secondary to assist with perineal hygiene and to pull / adjust clothing back to waist with additional steadying assist, and continuous verbal and tactile cueing.     During second treatment session, he participated in therapeutic exercise performing 5x10 seated pushups requiring lifting assist, and verbal and tactile cueing for technique challenging him to lift buttocks off seated surface to end range elbow  extension in order to strengthen triceps brachii to improve performance with sit <> stand transitions. Pt then participated in therapeutic exercise performing 3x10 bilateral UE proximal strengthening exercises with scapular retraction, shoulder extension, shoulder adduction, horizontal abduction, shoulder flexion in plane of scaption, internal rotation, and external rotation while seated in chair unsupported requiring continuous verbal and tactile cueing for technique while sustaining Spinal Precautions.     Functional Mobility:  Transfer Training:   Sit to stand:Moderate Assistance with Rolling Walker .  Bed <> Chair:  Step Transfer with Moderate Assistance with Rolling Walker .  Toilet Transfer:  Pt Step Transfer with Moderate Assistance with Rolling Walker and Grab bars .    Toilet Training:  Pt performed toileting with Maximum Assistance with Grab  bar at Commode.    Balance:   Static Sit: FAIR+: Able to take MINIMAL challenges from all directions  Dynamic Sit:  FAIR: Cannot move trunk without losing balance  Static Stand: POOR: Needs MODERATE assist to maintain  Dynamic stand: POOR: Needs MOD (moderate) assist during gait      Patient left up in chair with nurse notified    ASSESSMENT:  Pt demonstrated increased understanding and improved technique regarding Spinal Precautions allowing him to progress towards functional goals and regaining independence while also allowing his lumbar spine to heal.      Rehab potential is good    Activity tolerance: Fair    Discharge recommendations: other (see comments) (To be further determined based on progress prior to discharge.)     Equipment recommendations: other (see comments) (To be further determined based on progress prior to discharge.)     GOALS:   Multidisciplinary Problems     Occupational Therapy Goals        Problem: Occupational Therapy    Goal Priority Disciplines Outcome Interventions   Occupational Therapy Goal     OT, PT/OT     Description: Long Term Goals  to be met by: 04/11/22     Patient will increase functional independence with ADLs by performing:    Feeding with [place].  UE Dressing with Modified [place].  LE Dressing with Modified [place].  Grooming while seated at sink with Modified [place].  Toileting from toilet with Modified [place] for hygiene and clothing management.   Bathing from  shower chair/bench with Modified [place].  Step transfer with Modified [place]  Toilet transfer to toilet with Modified [place].  Increased functional strength to 4+/5 for bilateral UE's.                     Plan:  Patient to be seen 5 x/week (for 90 min per day for 14 days) to address the above listed problems via self-care/home management, community/work re-entry, therapeutic activities, therapeutic exercises  Plan of Care expires: 04/11/22  Plan of Care reviewed with: patient         03/29/2022

## 2022-03-29 NOTE — PT/OT/SLP EVAL
..Speech Language Pathology   Evaluation Combo     Patient Name:  Maurice Roy  MRN: [id number]   Admitting Diagnosis:Spinal Stenosis of Lumbar Region with Neurogenic Claudication  Diet recommendations:   Solid Diet Level: Regular    Liquid Diet Level: Thin      SLP Treatment Date: 3/28/2022  Speech Start Time: 1700    Speech Stop Time: 1800    Speech Total (min): 60 min        TREATMENT BILLABLE MINUTES:  Eval 60 minutes      Past Medical History:   Diagnosis Date    Arthritis      Cancer       prostate    Bilateral Levelock (w/ Bilateral Aids)      Hypertension              Past Surgical History:   Procedure Laterality Date    ANKLE SURGERY         right    CHOLECYSTECTOMY        COLONOSCOPY        FACETECTOMY OF VERTEBRA Bilateral 3/16/2022     Procedure: FACETECTOMY, PARTIAL MEDIAL, BILATERAL, L4-5 AND L5-S1;  Surgeon: Anil Hector MD;  Location: [facility] OR;  Service: Orthopedics;  Laterality: Bilateral;    FORAMINOTOMY Bilateral 3/16/2022     Procedure: FORAMINOTOMY, SPINE, BILATERAL, L4-5 AND L5-S1;  Surgeon: Anil Hector MD;  Location: [facility] OR;  Service: Orthopedics;  Laterality: Bilateral;    LAMINOTOMY N/A 3/16/2022     Procedure: LAMINOTOMY S1;  Surgeon: Anil Hector MD;  Location: [facility] OR;  Service: Orthopedics;  Laterality: N/A;    LUMBAR LAMINECTOMY N/A 3/16/2022     Procedure: LAMINECTOMY, SPINE, LUMBAR, OPEN, L4 AND L5;  Surgeon: Anil Hector MD;  Location: [facility] OR;  Service: Orthopedics;  Laterality: N/A;  Will go to CCU Post-op per Dr. LUEVANO    PERIPHERALLY INSERTED CENTRAL CATHETER INSERTION N/A 3/24/2022     Procedure: INSERTION, PICC;  Surgeon: Kathryn Diagnostic Provider;  Location: [facility] OR;  Service: General;  Laterality: N/A;            General Precautions:   standard; fall; Freddy Levelock w/ Freddy H. Aids; Pt requires lip reading access.       Social Hx: Patient lives with  Son; Dgtr & MARISSA across street & care for Dad;      Prior diet: Regular; thin liquids     Subjective:  Patient  was awake, alert, and conversant.        Patient goals:  to return home, at PLOF.     Pain/Comfort    Pain Rating 1: 0/10     Objective:   Oral Musculature Evaluation  Oral Musculature: WFL for speech & swallow.   Dentition: Upper/ Lower dentures; fit is adequate    Cognitive Status:  Behavioral Observations: alert ; cooperative   Memory and Orientation:   Orientation  Orientation Yes No  Inconsistent Comments   Person x         Place x         Situation x         City x         State x         Time x         Month x         Date x         Day of Week x         Year x          x         Age x         Family Members x         Biographical Information x            Assessment:   Mr. Roy is a 89 y.o. male with a medical diagnosis of Spinal Stenosis of Lumbar Region with Neurogenic Claudication.    Eval of oral mech revealed no gross weakness or decreased ROM.    Speech was 100% intelligible in conversation with no dysarthria.    Vocal quality and intensity were WFL    Cognitive Status:    Pt. was oriented x 3 with good recall of recent and remote temporal and general information.  Immediate/delayed verbal recall was WFL, with pt. Repeating 5 words without difficulty.   Pt was able to immediately recall 2/3 unrelated items and 3/3 items after a delay.   Pt  was able to recall last meal and able to recall other recent information and events from  his  hospitalization.  Responses to hypothetical verbal problem solving tasks were accurate and complete.    Pt. Compared and contrasted objects and generated multiple solutions to problems.    Thought organization and categorization skills were wfl, with pt. Naming 17 animals given one minute. (15-20 = wnl)  Pt. Responded to functional math and time calculations with 100% accuracy and   sequenced 4 words presented auditorily on 2/2 trials.     Attention:  WFL for conversation; [place] hampers ability to attend to speaker if wearing a mask.   Problem Solving/basic abstract  reasoning: WFL     Comprehension:   Pt. Responded to complex yes/no questions and 3-step verbal commands with 100% accuracy and no delays in responding.    Verbal Expression:  naming and automatic speech x WFL  Divergent naming, Pt was able to name 15 foods in 60 seconds (15-20 = wnl.)     Verbal:    Verbal language skills were wfl with no evidence of aphasia.  Pt. Expressed thoughts coherently in conversation with no evidence of confusion or word finding deficits    Motor Speech:      WFL    Voice:      WFL    Pragmatics:       WFL     Bedside Swallow Eval:  Consistencies Assessed: Pt tolerated a regular diet w/ thin liquids via straw,  for supper meal, w/ no overt s/s aspiration. Pt's % intake was decreased, & pt requested a chocolate supplement, if not eating well.   Supplement added to diet.      Oral Phase: WFL   Pharyngeal Phase: No overt clinical signs/symptoms of pharyngeal dysphagia        Plan:    No ST services warranted at this time   SLP Follow-up?: No     -Brianne Chairez, SUNSHINE-SLP  03/28/2022

## 2022-03-30 PROCEDURE — 97535 SELF CARE MNGMENT TRAINING: CPT

## 2022-03-30 PROCEDURE — 63600175 PHARM REV CODE 636 W HCPCS: Performed by: INTERNAL MEDICINE

## 2022-03-30 PROCEDURE — 97110 THERAPEUTIC EXERCISES: CPT

## 2022-03-30 PROCEDURE — 11000001 HC ACUTE MED/SURG PRIVATE ROOM

## 2022-03-30 PROCEDURE — 97530 THERAPEUTIC ACTIVITIES: CPT

## 2022-03-30 PROCEDURE — 25000003 PHARM REV CODE 250: Performed by: INTERNAL MEDICINE

## 2022-03-30 RX ADMIN — MUPIROCIN: 20 OINTMENT TOPICAL at 09:03

## 2022-03-30 RX ADMIN — THERA TABS 1 TABLET: TAB at 08:03

## 2022-03-30 RX ADMIN — HYDROCODONE BITARTRATE AND ACETAMINOPHEN 1 TABLET: 5; 325 TABLET ORAL at 01:03

## 2022-03-30 RX ADMIN — MUPIROCIN: 20 OINTMENT TOPICAL at 08:03

## 2022-03-30 RX ADMIN — TAMSULOSIN HYDROCHLORIDE 0.4 MG: 0.4 CAPSULE ORAL at 08:03

## 2022-03-30 RX ADMIN — TAMSULOSIN HYDROCHLORIDE 0.4 MG: 0.4 CAPSULE ORAL at 09:03

## 2022-03-30 RX ADMIN — Medication 1000 UNITS: at 08:03

## 2022-03-30 RX ADMIN — AMLODIPINE BESYLATE 5 MG: 5 TABLET ORAL at 09:03

## 2022-03-30 RX ADMIN — ENOXAPARIN SODIUM 30 MG: 30 INJECTION SUBCUTANEOUS at 04:03

## 2022-03-30 RX ADMIN — FENOFIBRATE 145 MG: 145 TABLET, FILM COATED ORAL at 09:03

## 2022-03-30 RX ADMIN — MIRTAZAPINE 7.5 MG: 7.5 TABLET ORAL at 09:03

## 2022-03-30 RX ADMIN — ASPIRIN 81 MG: 81 TABLET, COATED ORAL at 08:03

## 2022-03-30 NOTE — CONSULTS
"  [facility])  Adult Nutrition  Consult Note    SUMMARY     Recommendations    Recommendation/Intervention:   1. Continue current diet order- Regular. Honor food preferences     2. Oral nutrition supplementation- Ensure Enlive BID     3. RD to monitor and make recs accordingly.    Goals: 50-75% po intake through out admission  Nutrition Goal Status: new    Assessment and Plan    Nutrition Problem  Inadequate protein-energy intake    Related to (etiology):   Decrease appetite, early satiety    Signs and Symptoms (as evidenced by):   Avg PO intake @ 25-50%     Interventions/Recommendations (treatment strategy):  1. Continue current diet order- Regular. Honor food preferences     2. Oral nutrition supplementation- Ensure Enlive BID     3. RD to monitor and make recs accordingly.    Nutrition Diagnosis Status:   New      Reason for Assessment    Reason For Assessment: consult  Diagnosis: other (see comments) (spinal stenosis)  Relevant Medical History: Ca, HTN  General Information Comments: RD consulted for new rehab admit. PO @ 50%- per patient and family, he is not a big eater. At home he eats two meals per day, 10am and 4 pm. He states he has not had any sudden decrease in appetite and he has not had any weight loss. He is willing to drink Chocolate Ensure BID throughout admission since activity will be increased. Noted, he is on Remeron. Spine Sx performed on 3/16.  Nutrition Discharge Planning: Regular    Nutrition Risk Screen    Nutrition Risk Screen: no indicators present    Nutrition/Diet History    Patient Reported Diet/Restrictions/Preferences: general  Spiritual, Cultural Beliefs, Latter-day Practices, Values that Affect Care: no  Food Allergies: NKFA  Factors Affecting Nutritional Intake: early satiety    Anthropometrics    Temp: 97.2 °F (36.2 °C)  Height Method: Stated  Height: 5' 6" (167.6 cm)  Height (inches): 66 in  Weight Method: Standard Scale  Weight: 80.7 kg (178 lb)  Weight (lb): 178 " lb  Ideal Body Weight (IBW), Male: 142 lb  % Ideal Body Weight, Male (lb): 125.35 %  BMI (Calculated): 28.7  BMI Grade: 25 - 29.9 - overweight       Lab/Procedures/Meds    Pertinent Labs Reviewed: reviewed  Pertinent Medications Reviewed: reviewed  Pertinent Medications Comments: Remeron, MVI    Physical Findings/Assessment         Estimated/Assessed Needs    Weight Used For Calorie Calculations: 71 kg (156 lb 8.4 oz)  Energy Calorie Requirements (kcal): 1775 kcal (25 kcal/kg AJ IBW)  Energy Need Method: Kcal/kg  Protein Requirements: 71-85 gm (1-1.2 gm/kg AJ IBW)  Weight Used For Protein Calculations: 71 kg (156 lb 8.4 oz)     Estimated Fluid Requirement Method: RDA Method  RDA Method (mL): 1775         Nutrition Prescription Ordered    Current Diet Order: Regular  Oral Nutrition Supplement: Ensure BID    Evaluation of Received Nutrient/Fluid Intake       % Intake of Estimated Energy Needs: 25 - 50 %  % Meal Intake: 25 - 50 %    Nutrition Risk    Level of Risk/Frequency of Follow-up: low - moderate       Monitor and Evaluation    Food and Nutrient Intake: food and beverage intake  Food and Nutrient Adminstration: diet order  Physical Activity and Function: nutrition-related ADLs and IADLs  Anthropometric Measurements: weight, weight change  Biochemical Data, Medical Tests and Procedures: lipid profile, gastrointestinal profile, electrolyte and renal panel, glucose/endocrine profile, inflammatory profile  Nutrition-Focused Physical Findings: overall appearance       Nutrition Follow-Up    RD Follow-up?: Yes

## 2022-03-30 NOTE — SUBJECTIVE & OBJECTIVE
Interval History: Pt seen and evaluated    Review of Systems   Constitutional:  Positive for activity change, appetite change and fatigue. Negative for chills and fever.   HENT:  Negative for congestion, drooling, ear pain, mouth sores, nosebleeds, sinus pressure, sinus pain and sore throat.    Eyes:  Negative for discharge, itching and visual disturbance.   Respiratory:  Positive for shortness of breath. Negative for apnea, cough, chest tightness and wheezing.    Cardiovascular:  Positive for leg swelling. Negative for chest pain and palpitations.   Gastrointestinal:  Positive for constipation. Negative for abdominal distention, abdominal pain, blood in stool, diarrhea, nausea and vomiting.   Endocrine: Positive for cold intolerance. Negative for heat intolerance and polyphagia.   Genitourinary:  Positive for difficulty urinating. Negative for dysuria, flank pain and frequency.   Musculoskeletal:  Positive for arthralgias and back pain. Negative for myalgias.   Skin:  Negative for rash.   Neurological:  Positive for tremors and weakness. Negative for dizziness, seizures, syncope, facial asymmetry, speech difficulty and headaches.   Hematological:  Negative for adenopathy.   Psychiatric/Behavioral:  Negative for agitation, confusion and hallucinations. The patient is not nervous/anxious.    Objective:     Vital Signs (Most Recent):  Temp: 97.2 °F (36.2 °C) (03/30/22 0601)  Pulse: 85 (03/30/22 0601)  Resp: 18 (03/30/22 0601)  BP: (!) 189/76 (03/30/22 0601)  SpO2: 95 % (03/30/22 0601)   Vital Signs (24h Range):  Temp:  [97 °F (36.1 °C)-97.2 °F (36.2 °C)] 97.2 °F (36.2 °C)  Pulse:  [] 85  Resp:  [18] 18  SpO2:  [95 %-96 %] 95 %  BP: (144-189)/(69-76) 189/76     Weight: 80.9 kg (178 lb 4.8 oz)  Body mass index is 28.78 kg/m².    Intake/Output Summary (Last 24 hours) at 3/30/2022 1320  Last data filed at 3/30/2022 1218  Gross per 24 hour   Intake 1320 ml   Output 1250 ml   Net 70 ml      Physical  Exam  Constitutional:       General: He is awake. He is not in acute distress.     Appearance: Normal appearance. He is ill-appearing. He is not toxic-appearing or diaphoretic.   HENT:      Head: Normocephalic and atraumatic.      Nose: Nose normal. No congestion or rhinorrhea.      Mouth/Throat:      Mouth: Mucous membranes are moist.      Pharynx: Oropharynx is clear. No oropharyngeal exudate or posterior oropharyngeal erythema.   Eyes:      General: No scleral icterus.        Right eye: No discharge.         Left eye: No discharge.      Extraocular Movements: Extraocular movements intact.      Pupils: Pupils are equal, round, and reactive to light.   Neck:      Thyroid: No thyroid mass or thyromegaly.      Vascular: No carotid bruit.      Meningeal: Brudzinski's sign and Kernig's sign absent.   Cardiovascular:      Rate and Rhythm: Normal rate and regular rhythm.      Chest Wall: PMI is not displaced. No thrill.      Pulses: Normal pulses.      Heart sounds: Normal heart sounds. No murmur heard.    No friction rub. No gallop.   Pulmonary:      Effort: Pulmonary effort is normal. No tachypnea, accessory muscle usage, prolonged expiration or respiratory distress.      Breath sounds: Normal breath sounds. No stridor or decreased air movement. No wheezing, rhonchi or rales.   Chest:      Chest wall: No tenderness.   Abdominal:      General: Bowel sounds are normal. There is no distension.      Palpations: Abdomen is soft. There is no hepatomegaly, splenomegaly or mass.      Tenderness: There is no abdominal tenderness. There is no right CVA tenderness, left CVA tenderness, guarding or rebound.      Hernia: No hernia is present.   Musculoskeletal:         General: No swelling, tenderness, deformity or signs of injury.      Cervical back: Normal range of motion and neck supple. No rigidity. No muscular tenderness.      Right lower leg: Edema present.      Left lower leg: Edema present.      Comments: Brace in place  (L-Spine)   Lymphadenopathy:      Cervical: No cervical adenopathy.   Skin:     General: Skin is warm.      Capillary Refill: Capillary refill takes less than 2 seconds.      Coloration: Skin is not cyanotic, jaundiced or pale.      Findings: Bruising, erythema and lesion present. No petechiae or rash.   Neurological:      Mental Status: He is alert and oriented to person, place, and time.      Cranial Nerves: No cranial nerve deficit, dysarthria or facial asymmetry.      Motor: Weakness present. No tremor.      Gait: Gait abnormal.   Psychiatric:         Mood and Affect: Mood normal. Mood is not anxious or depressed. Affect is not flat.         Speech: Speech is not rapid and pressured or slurred.         Behavior: Behavior normal. Behavior is not agitated, aggressive or combative.         Thought Content: Thought content normal. Thought content is not paranoid or delusional.         Cognition and Memory: Cognition is not impaired. Memory is not impaired.         Judgment: Judgment normal.       Significant Labs: All pertinent labs within the past 24 hours have been reviewed.  Recent Lab Results       None            Significant Imaging: I have reviewed all pertinent imaging results/findings within the past 24 hours.

## 2022-03-30 NOTE — PT/OT/SLP PROGRESS
Occupational Therapy  Treatment    Maurice Roy   MRN: [id number]   Admitting Diagnosis: Spinal stenosis of lumbar region with neurogenic claudication    OT Date of Treatment: 03/30/22   AM Start Time: na  AM End Time: na  PM Start Time: 1230  PM End Time: 1400  Treatment Type: Individual 90  Total Time (min): 90 min      Billable Minutes:  Self Care/Home Management 15, Therapeutic Activity 15 and Therapeutic Exercise 60  Total Minutes: 90    General Precautions: Standard,    Orthopedic Precautions: spinal precautions  Braces:      Spiritual, Cultural Beliefs, Mormonism Practices, Values that Affect Care: no    Subjective:  Communicated with nurse prior to session.    Pain/Comfort  Pain Rating 1: 6/10  Location - Orientation 1: lower  Location 1: back  Pain Addressed 1: Reposition, Nurse notified, Cessation of Activity  Pain Rating Post-Intervention 1: 3/10    Objective:  Patient found with: chavarria catheter. Pt was cooperative and motivated without verbal encouragement while exhibiting positive affect. He participated in functional transfer retraining throughout session to / from wheelchair, toilet, and chair emphasizing fall prevention providing extra time with repetition requiring mod assist secondary to lifting assist and steadying with mod verbal and tactile cueing for safety awareness and technique utilizing RW. Pt then participated in therapeutic exercise performing 5x12 seated pushups requiring decreased lifting assist, and verbal and tactile cueing for technique challenging him to lift buttocks off seated surface to end range elbow extension in order to strengthen triceps brachii to improve performance with sit <> stand transitions. Next, he participated in therapeutic exercise performing 3x12 bilateral UE proximal strengthening exercises with scapular retraction, shoulder extension, shoulder adduction, horizontal abduction, shoulder flexion in plane of scaption, internal rotation, and external rotation  while seated in chair unsupported requiring continuous verbal and tactile cueing for technique while sustaining Spinal Precautions. Pt then participated in ADL retraining regarding UB dressing emphasizing use of compensatory strategies in order to sustain Spinal Precautions providing extra time requiring mod assist secondary to assist to pull / adjust pullover shirt to waist on back and to cookie TLSO with continuous verbal and tactile cueing.     Functional Mobility:  Transfer Training:   Sit to stand:Moderate Assistance with Rolling Walker .  Bed <> Chair:  Step Transfer with Moderate Assistance with Rolling Walker .  Toilet Transfer:  Pt Step Transfer with Moderate Assistance with Grab bars .    UE Dressing:  Patient performed UE Dressing with Moderate Assistance with extra time at Edge of bed.    Balance:   Static Sit: FAIR+: Able to take MINIMAL challenges from all directions  Dynamic Sit:  FAIR: Cannot move trunk without losing balance  Static Stand: POOR: Needs MODERATE assist to maintain  Dynamic stand: POOR: Needs MOD (moderate) assist during gait      Patient left up in chair with PT present    ASSESSMENT:  Pt demonstrated improved technique with UB dressing following ADL retraining with use of compensatory strategies allowing him to progress towards minimal assistance, but will require more repetition.     Rehab potential is good    Activity tolerance: Fair    Discharge recommendations: other (see comments) (To be further determined based on progress prior to discharge.)     Equipment recommendations: other (see comments) (To be further determined based on progress prior to discharge.)     GOALS:   Multidisciplinary Problems     Occupational Therapy Goals        Problem: Occupational Therapy    Goal Priority Disciplines Outcome Interventions   Occupational Therapy Goal     OT, PT/OT     Description: Long Term Goals to be met by: 04/11/22     Patient will increase functional independence with ADLs by  performing:    Feeding with Warner.  UE Dressing with Modified Warner.  LE Dressing with Modified Warner.  Grooming while seated at sink with Modified Warner.  Toileting from toilet with Modified Warner for hygiene and clothing management.   Bathing from  shower chair/bench with Modified Warner.  Step transfer with Modified Warner  Toilet transfer to toilet with Modified Warner.  Increased functional strength to 4+/5 for bilateral UE's.                     Plan:  Patient to be seen 5 x/week (for 90 min per day for 14 days) to address the above listed problems via self-care/home management, community/work re-entry, therapeutic activities, therapeutic exercises  Plan of Care expires: 04/11/22  Plan of Care reviewed with: patient         03/30/2022

## 2022-03-30 NOTE — HOSPITAL COURSE
3/30 SD: Pt laying in bed. Localized hematoma  to right forearm - treating with pressure dressing. Denies any complaints. Continue IPR efforts.    3/31 SD: Pt dressing, sitting in wheelchair in room. Wearing back brace. Positive attitude. Bladder training complete. Remove chavarria catheter today.    4/1 SD: Pt sitting on side of bed getting dressed with assistance, then transitions to wheelchair for therapy. Pt had urinary retention of 300ml and inability to urinate after removal of chavarria catheter. A new chavarria catheter was instilled.    4/2 WC:  stable without acute issues.    4/4/22 LFC no acute issues    4/6 SD: Sitting in wheelchair in common area. C/O constipation.    4/7 SD: C/O cough productive of green sputum. Denies fever or chills.     4/8 SD: Pt had bowel movement and feels much better. His cause is improved with neb treatments and PRN Robitussin.    4/9 ND reports cough has improved; doing well today    4/10 ND patient reports feeling weak just not hungry today and having occasional back pain which is normal since the surgery    4/11 SD: Pt reports constipation - PRN orders in place. Cough improved.    Discharge Note:  Patient was able to tolerate therapy and meet his required hours.  Patient made functional gains and had improved pain.  Patient's mobility and safety improved.  Unfortunately we were unable to wean the patient from his Chavarria catheter and it had to be reinserted.  Patient will follow-up with his urologist after discharge.

## 2022-03-30 NOTE — PT/OT/SLP PROGRESS
Physical Therapy         Treatment        Maurice Roy   MRN: [id number]     PT Received On: 03/30/22  Total Time (min): 90        Billable Minutes:  Gait [id number], Therapeutic Activity 30 and Therapeutic Exercise 30  Total Minutes: 90  AM Start Time:   AM End Time:   PM Start Time: 14  PM End Time: 1530  Treatment Type: Individual 75 and Concurrent 15  Treatment Type: Treatment  PT/PTA: PT             General Precautions: Standard,    Orthopedic Precautions:     Braces:           Subjective:  Communicated with nurse prior to session.    Pain/Comfort  Pain Rating 1: 5/10  Location 1: back    Objective:  Patient found up in wc, with      Functional Mobility:  Bed Mobility:   Supine to sit: Minimal Assistance   Sit to supine: Moderate Assistance   Rolling: Minimal Assistance   Scooting: Moderate Assistance    Balance:   Static Sit: FAIR: Maintains without assist, but unable to take any challenges   Dynamic Sit:  FAIR: Cannot move trunk without losing balance  Static Stand: POOR: Needs MODERATE assist to maintain  Dynamic stand: POOR: Needs MOD (moderate) assist during gait    Transfer Training:  Sit to stand:Moderate Assistance with Rolling Walker    Bed <> Chair:  Step Transfer with Moderate Assistance with Rolling Walker      Wheelchair Training:  Pt demonstrated sba with wc mobility 100ft    Gait Training:  Pt ambulated 3 times for 100ft with rw and cga    Stair Training:  Not attempted yet      Additional Treatment:  Pt performed sitting BLE exs with 2.5 wts and standing AROM exs 3 sets 15 reps    Activity Tolerance:  Patient tolerated treatment well    Patient left supine with call button in reach.    Assessment:  Maurice Roy is a 89 y.o. male with a medical diagnosis of Spinal stenosis of lumbar region with neurogenic claudication. He presents with showing better gait endurance , but still required moda during transfers.    Rehab potential is good.    Activity tolerance: Good    Discharge  recommendations: Discharge Facility/Level of Care Needs: other (see comments) (to be determined later)     Equipment recommendations:       GOALS:   Multidisciplinary Problems     Physical Therapy Goals     Not on file                PLAN:    Patient to be seen 5 x/week  to address the above listed problems via therapeutic activities, gait training, wheelchair management/training, therapeutic exercises, therapeutic groups, neuromuscular re-education  Plan of Care expires: 04/11/22  Plan of Care reviewed with: patient         3/30/2022

## 2022-03-30 NOTE — PROGRESS NOTES
[facility] Medicine  Progress Note    Patient Name: Maurice Roy  MRN: [id number]  Patient Class: IP- Rehab   Admission Date: 3/28/2022  Length of Stay: 2 days  Attending Physician: Freeman Kathleen III, MD  Primary Care Provider: Marleni Castillo MD        Subjective:     Principal Problem:Spinal stenosis of lumbar region with neurogenic claudication        HPI:  Patient is a 89-year-old male with a history of generalized arthritis prostate cancer hypertension and lower back pain.  The patient has a history of spinal stenosis and chronic lower back related issues.  At an outside facility the patient underwent a L4-L5 laminectomy and a foraminotomy at L4-L5 and S5-L5 S1.  Postoperatively the patient had pain in the back and tenderness at the surgical site.  His lower extremity weakness and neuropathic type symptoms improved after surgery.  Patient was seen by primary care after surgery and he was placed on antibiotics.  He was also started on antiplatelets and anti-platelet agents postoperatively.  Patient did have an episode of urinary tension postoperatively requiring a Nixon catheter placement urologic consultation and initiation of BPH medications.  Postop the patient also had worsening tremors was seen by Neurology had an abnormality on EKG and was seen by Cardiology and had several medication changes.  Patient also has had several electrolyte abnormalities requiring treatment.  Slowly the patient began to stabilize and he began therapy and he was referred to our inpatient rehab unit for close monitoring routine blood work close physician oversight and rehabilitative care.  I evaluated the patient this morning on the exercise bike he is very eager and motivated to complete therapy and return home.  The patient was still functioning at a modified independent functional state and is hoping to get back to goal of return home.  The patient has multiple skin tear is requiring wound  treatment as well as a hematoma to the right forearm.  Patient is also having dyspnea with speaking and moderate exertion.  Patient's chart has been reviewed feel is an excellent candidate for our unit.        Overview/Hospital Course:  3/30 SD: Pt laying in bed. Localized hematoma  to right forearm - treating with pressure dressing. Denies any complaints. Continue IPR efforts.      Interval History: Pt seen and evaluated    Review of Systems   Constitutional:  Positive for activity change, appetite change and fatigue. Negative for chills and fever.   HENT:  Negative for congestion, drooling, ear pain, mouth sores, nosebleeds, sinus pressure, sinus pain and sore throat.    Eyes:  Negative for discharge, itching and visual disturbance.   Respiratory:  Positive for shortness of breath. Negative for apnea, cough, chest tightness and wheezing.    Cardiovascular:  Positive for leg swelling. Negative for chest pain and palpitations.   Gastrointestinal:  Positive for constipation. Negative for abdominal distention, abdominal pain, blood in stool, diarrhea, nausea and vomiting.   Endocrine: Positive for cold intolerance. Negative for heat intolerance and polyphagia.   Genitourinary:  Positive for difficulty urinating. Negative for dysuria, flank pain and frequency.   Musculoskeletal:  Positive for arthralgias and back pain. Negative for myalgias.   Skin:  Negative for rash.   Neurological:  Positive for tremors and weakness. Negative for dizziness, seizures, syncope, facial asymmetry, speech difficulty and headaches.   Hematological:  Negative for adenopathy.   Psychiatric/Behavioral:  Negative for agitation, confusion and hallucinations. The patient is not nervous/anxious.    Objective:     Vital Signs (Most Recent):  Temp: 97.2 °F (36.2 °C) (03/30/22 0601)  Pulse: 85 (03/30/22 0601)  Resp: 18 (03/30/22 0601)  BP: (!) 189/76 (03/30/22 0601)  SpO2: 95 % (03/30/22 0601)   Vital Signs (24h Range):  Temp:  [97 °F (36.1  °C)-97.2 °F (36.2 °C)] 97.2 °F (36.2 °C)  Pulse:  [] 85  Resp:  [18] 18  SpO2:  [95 %-96 %] 95 %  BP: (144-189)/(69-76) 189/76     Weight: 80.9 kg (178 lb 4.8 oz)  Body mass index is 28.78 kg/m².    Intake/Output Summary (Last 24 hours) at 3/30/2022 1320  Last data filed at 3/30/2022 1218  Gross per 24 hour   Intake 1320 ml   Output 1250 ml   Net 70 ml      Physical Exam  Constitutional:       General: He is awake. He is not in acute distress.     Appearance: Normal appearance. He is ill-appearing. He is not toxic-appearing or diaphoretic.   HENT:      Head: Normocephalic and atraumatic.      Nose: Nose normal. No congestion or rhinorrhea.      Mouth/Throat:      Mouth: Mucous membranes are moist.      Pharynx: Oropharynx is clear. No oropharyngeal exudate or posterior oropharyngeal erythema.   Eyes:      General: No scleral icterus.        Right eye: No discharge.         Left eye: No discharge.      Extraocular Movements: Extraocular movements intact.      Pupils: Pupils are equal, round, and reactive to light.   Neck:      Thyroid: No thyroid mass or thyromegaly.      Vascular: No carotid bruit.      Meningeal: Brudzinski's sign and Kernig's sign absent.   Cardiovascular:      Rate and Rhythm: Normal rate and regular rhythm.      Chest Wall: PMI is not displaced. No thrill.      Pulses: Normal pulses.      Heart sounds: Normal heart sounds. No murmur heard.    No friction rub. No gallop.   Pulmonary:      Effort: Pulmonary effort is normal. No tachypnea, accessory muscle usage, prolonged expiration or respiratory distress.      Breath sounds: Normal breath sounds. No stridor or decreased air movement. No wheezing, rhonchi or rales.   Chest:      Chest wall: No tenderness.   Abdominal:      General: Bowel sounds are normal. There is no distension.      Palpations: Abdomen is soft. There is no hepatomegaly, splenomegaly or mass.      Tenderness: There is no abdominal tenderness. There is no right CVA  tenderness, left CVA tenderness, guarding or rebound.      Hernia: No hernia is present.   Musculoskeletal:         General: No swelling, tenderness, deformity or signs of injury.      Cervical back: Normal range of motion and neck supple. No rigidity. No muscular tenderness.      Right lower leg: Edema present.      Left lower leg: Edema present.      Comments: Brace in place (L-Spine)   Lymphadenopathy:      Cervical: No cervical adenopathy.   Skin:     General: Skin is warm.      Capillary Refill: Capillary refill takes less than 2 seconds.      Coloration: Skin is not cyanotic, jaundiced or pale.      Findings: Bruising, erythema and lesion present. No petechiae or rash.   Neurological:      Mental Status: He is alert and oriented to person, place, and time.      Cranial Nerves: No cranial nerve deficit, dysarthria or facial asymmetry.      Motor: Weakness present. No tremor.      Gait: Gait abnormal.   Psychiatric:         Mood and Affect: Mood normal. Mood is not anxious or depressed. Affect is not flat.         Speech: Speech is not rapid and pressured or slurred.         Behavior: Behavior normal. Behavior is not agitated, aggressive or combative.         Thought Content: Thought content normal. Thought content is not paranoid or delusional.         Cognition and Memory: Cognition is not impaired. Memory is not impaired.         Judgment: Judgment normal.       Significant Labs: All pertinent labs within the past 24 hours have been reviewed.  Recent Lab Results       None            Significant Imaging: I have reviewed all pertinent imaging results/findings within the past 24 hours.      Assessment/Plan:      * Spinal stenosis of lumbar region with neurogenic claudication  Continue inpatient rehab efforts      Hematoma  Pressure dressing      BPH with obstruction/lower urinary tract symptoms  Nixon catheter in place      Hyperkalemia  Repeat BMP in AM      Hyponatremia  Improved. Monitor regular  labs.      Acute renal failure  Monitor regular labs and urine output      Wenckebach's phenomenon, heart block  Stable      Hypertension  Continue home meds. Monitor blood pressure and adjust meds PRN.        VTE Risk Mitigation (From admission, onward)         Ordered     enoxaparin injection 30 mg  Daily         03/28/22 1444     IP VTE HIGH RISK PATIENT  Once         03/28/22 1432     Place sequential compression device  Until discontinued         03/28/22 1432                Discharge Planning   MARCOS:      Code Status: Full Code   Is the patient medically ready for discharge?:     Reason for patient still in hospital (select all that apply): Patient trending condition, Laboratory test and Treatment  Discharge Plan A: Home Health                  Madiha Brown NP  Department of Hospital Medicine   [facility] ([facility])

## 2022-03-31 LAB
ALBUMIN SERPL BCP-MCNC: 2.3 G/DL (ref 3.5–5.2)
ALP SERPL-CCNC: 148 U/L (ref 55–135)
ALT SERPL W/O P-5'-P-CCNC: 84 U/L (ref 10–44)
ANION GAP SERPL CALC-SCNC: 5 MMOL/L (ref 8–16)
AST SERPL-CCNC: 58 U/L (ref 10–40)
BASOPHILS # BLD AUTO: 0.08 K/UL (ref 0–0.2)
BASOPHILS NFR BLD: 1.2 % (ref 0–1.9)
BILIRUB SERPL-MCNC: 0.3 MG/DL (ref 0.1–1)
BUN SERPL-MCNC: 49 MG/DL (ref 10–30)
CALCIUM SERPL-MCNC: 8.7 MG/DL (ref 8.7–10.5)
CHLORIDE SERPL-SCNC: 108 MMOL/L (ref 95–110)
CO2 SERPL-SCNC: 23 MMOL/L (ref 23–29)
CREAT SERPL-MCNC: 1.8 MG/DL (ref 0.5–1.4)
DIFFERENTIAL METHOD: ABNORMAL
EOSINOPHIL # BLD AUTO: 0.3 K/UL (ref 0–0.5)
EOSINOPHIL NFR BLD: 4.6 % (ref 0–8)
ERYTHROCYTE [DISTWIDTH] IN BLOOD BY AUTOMATED COUNT: 15.9 % (ref 11.5–14.5)
EST. GFR  (AFRICAN AMERICAN): 36.9 ML/MIN/1.73 M^2
EST. GFR  (NON AFRICAN AMERICAN): 32 ML/MIN/1.73 M^2
GLUCOSE SERPL-MCNC: 83 MG/DL (ref 70–110)
HCT VFR BLD AUTO: 24.7 % (ref 40–54)
HGB BLD-MCNC: 8.2 G/DL (ref 14–18)
IMM GRANULOCYTES # BLD AUTO: 0.02 K/UL (ref 0–0.04)
IMM GRANULOCYTES NFR BLD AUTO: 0.3 % (ref 0–0.5)
LYMPHOCYTES # BLD AUTO: 2.1 K/UL (ref 1–4.8)
LYMPHOCYTES NFR BLD: 30.4 % (ref 18–48)
MAGNESIUM SERPL-MCNC: 2.1 MG/DL (ref 1.6–2.6)
MCH RBC QN AUTO: 30.1 PG (ref 27–31)
MCHC RBC AUTO-ENTMCNC: 33.2 G/DL (ref 32–36)
MCV RBC AUTO: 91 FL (ref 82–98)
MONOCYTES # BLD AUTO: 1 K/UL (ref 0.3–1)
MONOCYTES NFR BLD: 13.8 % (ref 4–15)
NEUTROPHILS # BLD AUTO: 3.4 K/UL (ref 1.8–7.7)
NEUTROPHILS NFR BLD: 49.7 % (ref 38–73)
NRBC BLD-RTO: 0 /100 WBC
PLATELET # BLD AUTO: 443 K/UL (ref 150–450)
PMV BLD AUTO: 10 FL (ref 9.2–12.9)
POTASSIUM SERPL-SCNC: 5.2 MMOL/L (ref 3.5–5.1)
PROT SERPL-MCNC: 5.1 G/DL (ref 6–8.4)
RBC # BLD AUTO: 2.72 M/UL (ref 4.6–6.2)
SODIUM SERPL-SCNC: 136 MMOL/L (ref 136–145)
WBC # BLD AUTO: 6.9 K/UL (ref 3.9–12.7)

## 2022-03-31 PROCEDURE — 97110 THERAPEUTIC EXERCISES: CPT

## 2022-03-31 PROCEDURE — 80053 COMPREHEN METABOLIC PANEL: CPT | Performed by: NURSE PRACTITIONER

## 2022-03-31 PROCEDURE — 97530 THERAPEUTIC ACTIVITIES: CPT

## 2022-03-31 PROCEDURE — 85025 COMPLETE CBC W/AUTO DIFF WBC: CPT | Performed by: NURSE PRACTITIONER

## 2022-03-31 PROCEDURE — 83735 ASSAY OF MAGNESIUM: CPT | Performed by: NURSE PRACTITIONER

## 2022-03-31 PROCEDURE — 36415 COLL VENOUS BLD VENIPUNCTURE: CPT | Performed by: NURSE PRACTITIONER

## 2022-03-31 PROCEDURE — 25000003 PHARM REV CODE 250: Performed by: INTERNAL MEDICINE

## 2022-03-31 PROCEDURE — 63600175 PHARM REV CODE 636 W HCPCS: Performed by: INTERNAL MEDICINE

## 2022-03-31 PROCEDURE — 97116 GAIT TRAINING THERAPY: CPT

## 2022-03-31 PROCEDURE — 11000001 HC ACUTE MED/SURG PRIVATE ROOM

## 2022-03-31 RX ADMIN — MUPIROCIN: 20 OINTMENT TOPICAL at 09:03

## 2022-03-31 RX ADMIN — Medication 1000 UNITS: at 09:03

## 2022-03-31 RX ADMIN — TAMSULOSIN HYDROCHLORIDE 0.4 MG: 0.4 CAPSULE ORAL at 08:03

## 2022-03-31 RX ADMIN — MIRTAZAPINE 7.5 MG: 7.5 TABLET ORAL at 08:03

## 2022-03-31 RX ADMIN — HYDROCODONE BITARTRATE AND ACETAMINOPHEN 1 TABLET: 5; 325 TABLET ORAL at 01:03

## 2022-03-31 RX ADMIN — THERA TABS 1 TABLET: TAB at 09:03

## 2022-03-31 RX ADMIN — AMLODIPINE BESYLATE 5 MG: 5 TABLET ORAL at 08:03

## 2022-03-31 RX ADMIN — FENOFIBRATE 145 MG: 145 TABLET, FILM COATED ORAL at 08:03

## 2022-03-31 RX ADMIN — POLYETHYLENE GLYCOL (3350) 17 G: 17 POWDER, FOR SOLUTION ORAL at 09:03

## 2022-03-31 RX ADMIN — ENOXAPARIN SODIUM 30 MG: 30 INJECTION SUBCUTANEOUS at 05:03

## 2022-03-31 RX ADMIN — ASPIRIN 81 MG: 81 TABLET, COATED ORAL at 09:03

## 2022-03-31 RX ADMIN — TAMSULOSIN HYDROCHLORIDE 0.4 MG: 0.4 CAPSULE ORAL at 09:03

## 2022-03-31 NOTE — PT/OT/SLP PROGRESS
Physical Therapy         Treatment        Maurice Roy   MRN: [id number]     PT Received On: 03/31/22  Total Time (min): 90        Billable Minutes:  Gait [id number], Therapeutic Activity 30 and Therapeutic Exercise 30  Total Minutes: 90  AM Start Time: 1030  AM End Time: 1200  PM Start Time:   PM End Time:   Treatment Type: Individual 90  Treatment Type: Treatment  PT/PTA: PT             General Precautions: Standard,    Orthopedic Precautions:     Braces:           Subjective:  Communicated with nurse prior to session.         Objective:  Patient found in wc, with      Functional Mobility:  Bed Mobility:   Supine to sit: Minimal Assistance   Sit to supine: Moderate Assistance   Rolling: Minimal Assistance   Scooting: Moderate Assistance    Balance:   Static Sit: FAIR: Maintains without assist, but unable to take any challenges   Dynamic Sit:  FAIR: Cannot move trunk without losing balance  Static Stand: POOR+: Needs MINIMAL assist to maintain  Dynamic stand: FAIR: Needs CONTACT GUARD during gait    Transfer Training:  Sit to stand:Minimal Assistance with Rolling Walker    Bed <> Chair:  Step Transfer with Minimal Assistance with Rolling Walker      Wheelchair Training:  Propelled wc 50 ft sba  Gait Training:  Ambulated 3 times for 150ft with rw cga/sba    Stair Training:  Went up and down 4 steps for 2 attempts with yanet with pt using bilateral rails      Additional Treatment:  ptperformed supported sitting exs with 2.5lb wts 3 sets 15 reps and sit to stand repeatedly  with yanet and cues to lean forward more     Activity Tolerance:  Patient tolerated treatment well    Patient left supine with call button in reach.    Assessment:  Maurice Roy is a 89 y.o. male with a medical diagnosis of Spinal stenosis of lumbar region with neurogenic claudication. He presents with better transfers and more endurance with gait.    Rehab potential is good.    Activity tolerance: Good    Discharge recommendations:  Discharge Facility/Level of Care Needs: other (see comments) (to be determined later)     Equipment recommendations:       GOALS:   Multidisciplinary Problems     Physical Therapy Goals     Not on file                PLAN:    Patient to be seen 5 x/week  to address the above listed problems via gait training, therapeutic activities, wheelchair management/training, therapeutic exercises, therapeutic groups, neuromuscular re-education  Plan of Care expires: 04/11/22  Plan of Care reviewed with: patient         3/31/2022

## 2022-03-31 NOTE — PLAN OF CARE
Problem: Oral Intake Inadequate (Acute Kidney Injury/Impairment)  Goal: Optimal Nutrition Intake  Outcome: Ongoing, Progressing     Problem: Infection  Goal: Absence of Infection Signs and Symptoms  Outcome: Ongoing, Progressing     Problem: Skin Injury Risk Increased  Goal: Skin Health and Integrity  Outcome: Ongoing, Progressing     Problem: Fall Injury Risk  Goal: Absence of Fall and Fall-Related Injury  Outcome: Ongoing, Progressing

## 2022-03-31 NOTE — NURSING
Patient has not urinated since taking catheter out. He keeps having urge, but only several drips come out each time.  Bladder scanned shows 300 ml in bladder. Madiha hill NP notified with order to reinsert chavarria catheter.     Catheter was placed with 300 ml of clear yellow urine returned.

## 2022-03-31 NOTE — SUBJECTIVE & OBJECTIVE
Interval History: Pt seen and evaluated    Review of Systems   Constitutional:  Positive for activity change, appetite change and fatigue. Negative for chills and fever.   HENT:  Negative for congestion, drooling, ear pain, mouth sores, nosebleeds, sinus pressure, sinus pain and sore throat.    Eyes:  Negative for discharge, itching and visual disturbance.   Respiratory:  Negative for apnea, cough, chest tightness and wheezing.    Cardiovascular:  Negative for chest pain and palpitations.   Gastrointestinal:  Negative for abdominal distention, abdominal pain, blood in stool, diarrhea, nausea and vomiting.   Endocrine: Positive for cold intolerance. Negative for heat intolerance and polyphagia.   Genitourinary:  Positive for difficulty urinating. Negative for dysuria, flank pain and frequency.   Musculoskeletal:  Positive for arthralgias and back pain. Negative for myalgias.   Skin:  Negative for rash.   Neurological:  Positive for tremors and weakness. Negative for dizziness, seizures, syncope, facial asymmetry, speech difficulty and headaches.   Hematological:  Negative for adenopathy.   Psychiatric/Behavioral:  Negative for agitation, confusion and hallucinations. The patient is not nervous/anxious.    Objective:     Vital Signs (Most Recent):  Temp: 98 °F (36.7 °C) (03/30/22 2100)  Pulse: 90 (03/30/22 2100)  Resp: 18 (03/30/22 2100)  BP: (!) 160/70 (03/30/22 2100)  SpO2: 97 % (03/30/22 2100)   Vital Signs (24h Range):  Temp:  [98 °F (36.7 °C)] 98 °F (36.7 °C)  Pulse:  [90] 90  Resp:  [18] 18  SpO2:  [97 %] 97 %  BP: (160)/(70) 160/70     Weight: 80.7 kg (178 lb)  Body mass index is 28.73 kg/m².    Intake/Output Summary (Last 24 hours) at 3/31/2022 0931  Last data filed at 3/30/2022 1700  Gross per 24 hour   Intake 960 ml   Output 425 ml   Net 535 ml        Physical Exam  Constitutional:       General: He is awake. He is not in acute distress.     Appearance: Normal appearance. He is ill-appearing. He is not  toxic-appearing or diaphoretic.   HENT:      Head: Normocephalic and atraumatic.      Nose: Nose normal. No congestion or rhinorrhea.      Mouth/Throat:      Mouth: Mucous membranes are moist.      Pharynx: Oropharynx is clear. No oropharyngeal exudate or posterior oropharyngeal erythema.   Eyes:      General: No scleral icterus.        Right eye: No discharge.         Left eye: No discharge.      Extraocular Movements: Extraocular movements intact.      Pupils: Pupils are equal, round, and reactive to light.   Neck:      Thyroid: No thyroid mass or thyromegaly.      Vascular: No carotid bruit.      Meningeal: Brudzinski's sign and Kernig's sign absent.   Cardiovascular:      Rate and Rhythm: Normal rate and regular rhythm.      Chest Wall: PMI is not displaced. No thrill.      Pulses: Normal pulses.      Heart sounds: Normal heart sounds. No murmur heard.    No friction rub. No gallop.   Pulmonary:      Effort: Pulmonary effort is normal. No tachypnea, accessory muscle usage, prolonged expiration or respiratory distress.      Breath sounds: Normal breath sounds. No stridor or decreased air movement. No wheezing, rhonchi or rales.   Chest:      Chest wall: No tenderness.   Abdominal:      General: Bowel sounds are normal. There is no distension.      Palpations: Abdomen is soft. There is no hepatomegaly, splenomegaly or mass.      Tenderness: There is no abdominal tenderness. There is no right CVA tenderness, left CVA tenderness, guarding or rebound.      Hernia: No hernia is present.   Musculoskeletal:         General: No swelling, tenderness, deformity or signs of injury.      Cervical back: Normal range of motion and neck supple. No rigidity. No muscular tenderness.   Lymphadenopathy:      Cervical: No cervical adenopathy.   Skin:     General: Skin is warm.      Capillary Refill: Capillary refill takes less than 2 seconds.      Coloration: Skin is not cyanotic, jaundiced or pale.      Findings: Bruising, erythema  and lesion present. No petechiae or rash.   Neurological:      Mental Status: He is alert and oriented to person, place, and time.      Cranial Nerves: No cranial nerve deficit, dysarthria or facial asymmetry.      Motor: Weakness present. No tremor.      Gait: Gait abnormal.   Psychiatric:         Mood and Affect: Mood normal. Mood is not anxious or depressed. Affect is not flat.         Speech: Speech is not rapid and pressured or slurred.         Behavior: Behavior normal. Behavior is not agitated, aggressive or combative.         Thought Content: Thought content normal. Thought content is not paranoid or delusional.         Cognition and Memory: Cognition is not impaired. Memory is not impaired.         Judgment: Judgment normal.       Significant Labs: All pertinent labs within the past 24 hours have been reviewed.  Recent Lab Results         03/31/22  0625   03/31/22  0624        Albumin   2.3       Alkaline Phosphatase   148       ALT   84       Anion Gap   5       AST   58       Baso # 0.08         Basophil % 1.2         BILIRUBIN TOTAL   0.3  Comment: For infants and newborns, interpretation of results should be based  on gestational age, weight and in agreement with clinical  observations.    Premature Infant recommended reference ranges:  Up to 24 hours.............<8.0 mg/dL  Up to 48 hours............<12.0 mg/dL  3-5 days..................<15.0 mg/dL  6-29 days.................<15.0 mg/dL    For patients on Eltrombopag therapy, use of Dimension [place] TBIL is   not   recommended.         BUN   49       Calcium   8.7       Chloride   108       CO2   23       Creatinine   1.8       Differential Method Automated         eGFR if    36.9       eGFR if non    32.0  Comment: Calculation used to obtain the estimated glomerular filtration  rate (eGFR) is the CKD-EPI equation.          Eos # 0.3         Eosinophil % 4.6         Glucose   83       Gran # (ANC) 3.4         Gran %  49.7         Hematocrit 24.7         Hemoglobin 8.2         Immature Grans (Abs) 0.02  Comment: Mild elevation in immature granulocytes is non specific and   can be seen in a variety of conditions including stress response,   acute inflammation, trauma and pregnancy. Correlation with other   laboratory and clinical findings is essential.           Immature Granulocytes 0.3         Lymph # 2.1         Lymph % 30.4         Magnesium   2.1       MCH 30.1         MCHC 33.2         MCV 91         Mono # 1.0         Mono % 13.8         MPV 10.0         nRBC 0         Platelets 443         Potassium   5.2       PROTEIN TOTAL   5.1       RBC 2.72         RDW 15.9         Sodium   136       WBC 6.90                 Significant Imaging: I have reviewed all pertinent imaging results/findings within the past 24 hours.

## 2022-03-31 NOTE — PROGRESS NOTES
[facility] Medicine  Progress Note    Patient Name: Maurice Roy  MRN: [id number]  Patient Class: IP- Rehab   Admission Date: 3/28/2022  Length of Stay: 3 days  Attending Physician: Freeman Kathleen III, MD  Primary Care Provider: Marleni Castillo MD        Subjective:     Principal Problem:Spinal stenosis of lumbar region with neurogenic claudication        HPI:  Patient is a 89-year-old male with a history of generalized arthritis prostate cancer hypertension and lower back pain.  The patient has a history of spinal stenosis and chronic lower back related issues.  At an outside facility the patient underwent a L4-L5 laminectomy and a foraminotomy at L4-L5 and S5-L5 S1.  Postoperatively the patient had pain in the back and tenderness at the surgical site.  His lower extremity weakness and neuropathic type symptoms improved after surgery.  Patient was seen by primary care after surgery and he was placed on antibiotics.  He was also started on antiplatelets and anti-platelet agents postoperatively.  Patient did have an episode of urinary tension postoperatively requiring a Nixon catheter placement urologic consultation and initiation of BPH medications.  Postop the patient also had worsening tremors was seen by Neurology had an abnormality on EKG and was seen by Cardiology and had several medication changes.  Patient also has had several electrolyte abnormalities requiring treatment.  Slowly the patient began to stabilize and he began therapy and he was referred to our inpatient rehab unit for close monitoring routine blood work close physician oversight and rehabilitative care.  I evaluated the patient this morning on the exercise bike he is very eager and motivated to complete therapy and return home.  The patient was still functioning at a modified independent functional state and is hoping to get back to goal of return home.  The patient has multiple skin tear is requiring wound  treatment as well as a hematoma to the right forearm.  Patient is also having dyspnea with speaking and moderate exertion.  Patient's chart has been reviewed feel is an excellent candidate for our unit.        Overview/Hospital Course:  3/30 SD: Pt laying in bed. Localized hematoma  to right forearm - treating with pressure dressing. Denies any complaints. Continue IPR efforts.    3/31 SD: Pt dressing, sitting in wheelchair in room. Wearing back brace. Positive attitude. Bladder training complete. Remove chavarria catheter today.      Interval History: Pt seen and evaluated    Review of Systems   Constitutional:  Positive for activity change, appetite change and fatigue. Negative for chills and fever.   HENT:  Negative for congestion, drooling, ear pain, mouth sores, nosebleeds, sinus pressure, sinus pain and sore throat.    Eyes:  Negative for discharge, itching and visual disturbance.   Respiratory:  Negative for apnea, cough, chest tightness and wheezing.    Cardiovascular:  Negative for chest pain and palpitations.   Gastrointestinal:  Negative for abdominal distention, abdominal pain, blood in stool, diarrhea, nausea and vomiting.   Endocrine: Positive for cold intolerance. Negative for heat intolerance and polyphagia.   Genitourinary:  Positive for difficulty urinating. Negative for dysuria, flank pain and frequency.   Musculoskeletal:  Positive for arthralgias and back pain. Negative for myalgias.   Skin:  Negative for rash.   Neurological:  Positive for tremors and weakness. Negative for dizziness, seizures, syncope, facial asymmetry, speech difficulty and headaches.   Hematological:  Negative for adenopathy.   Psychiatric/Behavioral:  Negative for agitation, confusion and hallucinations. The patient is not nervous/anxious.    Objective:     Vital Signs (Most Recent):  Temp: 98 °F (36.7 °C) (03/30/22 2100)  Pulse: 90 (03/30/22 2100)  Resp: 18 (03/30/22 2100)  BP: (!) 160/70 (03/30/22 2100)  SpO2: 97 % (03/30/22  2100)   Vital Signs (24h Range):  Temp:  [98 °F (36.7 °C)] 98 °F (36.7 °C)  Pulse:  [90] 90  Resp:  [18] 18  SpO2:  [97 %] 97 %  BP: (160)/(70) 160/70     Weight: 80.7 kg (178 lb)  Body mass index is 28.73 kg/m².    Intake/Output Summary (Last 24 hours) at 3/31/2022 0931  Last data filed at 3/30/2022 1700  Gross per 24 hour   Intake 960 ml   Output 425 ml   Net 535 ml        Physical Exam  Constitutional:       General: He is awake. He is not in acute distress.     Appearance: Normal appearance. He is ill-appearing. He is not toxic-appearing or diaphoretic.   HENT:      Head: Normocephalic and atraumatic.      Nose: Nose normal. No congestion or rhinorrhea.      Mouth/Throat:      Mouth: Mucous membranes are moist.      Pharynx: Oropharynx is clear. No oropharyngeal exudate or posterior oropharyngeal erythema.   Eyes:      General: No scleral icterus.        Right eye: No discharge.         Left eye: No discharge.      Extraocular Movements: Extraocular movements intact.      Pupils: Pupils are equal, round, and reactive to light.   Neck:      Thyroid: No thyroid mass or thyromegaly.      Vascular: No carotid bruit.      Meningeal: Brudzinski's sign and Kernig's sign absent.   Cardiovascular:      Rate and Rhythm: Normal rate and regular rhythm.      Chest Wall: PMI is not displaced. No thrill.      Pulses: Normal pulses.      Heart sounds: Normal heart sounds. No murmur heard.    No friction rub. No gallop.   Pulmonary:      Effort: Pulmonary effort is normal. No tachypnea, accessory muscle usage, prolonged expiration or respiratory distress.      Breath sounds: Normal breath sounds. No stridor or decreased air movement. No wheezing, rhonchi or rales.   Chest:      Chest wall: No tenderness.   Abdominal:      General: Bowel sounds are normal. There is no distension.      Palpations: Abdomen is soft. There is no hepatomegaly, splenomegaly or mass.      Tenderness: There is no abdominal tenderness. There is no right  CVA tenderness, left CVA tenderness, guarding or rebound.      Hernia: No hernia is present.   Musculoskeletal:         General: No swelling, tenderness, deformity or signs of injury.      Cervical back: Normal range of motion and neck supple. No rigidity. No muscular tenderness.   Lymphadenopathy:      Cervical: No cervical adenopathy.   Skin:     General: Skin is warm.      Capillary Refill: Capillary refill takes less than 2 seconds.      Coloration: Skin is not cyanotic, jaundiced or pale.      Findings: Bruising, erythema and lesion present. No petechiae or rash.   Neurological:      Mental Status: He is alert and oriented to person, place, and time.      Cranial Nerves: No cranial nerve deficit, dysarthria or facial asymmetry.      Motor: Weakness present. No tremor.      Gait: Gait abnormal.   Psychiatric:         Mood and Affect: Mood normal. Mood is not anxious or depressed. Affect is not flat.         Speech: Speech is not rapid and pressured or slurred.         Behavior: Behavior normal. Behavior is not agitated, aggressive or combative.         Thought Content: Thought content normal. Thought content is not paranoid or delusional.         Cognition and Memory: Cognition is not impaired. Memory is not impaired.         Judgment: Judgment normal.       Significant Labs: All pertinent labs within the past 24 hours have been reviewed.  Recent Lab Results         03/31/22  0625   03/31/22  0624        Albumin   2.3       Alkaline Phosphatase   148       ALT   84       Anion Gap   5       AST   58       Baso # 0.08         Basophil % 1.2         BILIRUBIN TOTAL   0.3  Comment: For infants and newborns, interpretation of results should be based  on gestational age, weight and in agreement with clinical  observations.    Premature Infant recommended reference ranges:  Up to 24 hours.............<8.0 mg/dL  Up to 48 hours............<12.0 mg/dL  3-5 days..................<15.0 mg/dL  6-29  days.................<15.0 mg/dL    For patients on Eltrombopag therapy, use of Dimension [place] TBIL is   not   recommended.         BUN   49       Calcium   8.7       Chloride   108       CO2   23       Creatinine   1.8       Differential Method Automated         eGFR if    36.9       eGFR if non    32.0  Comment: Calculation used to obtain the estimated glomerular filtration  rate (eGFR) is the CKD-EPI equation.          Eos # 0.3         Eosinophil % 4.6         Glucose   83       Gran # (ANC) 3.4         Gran % 49.7         Hematocrit 24.7         Hemoglobin 8.2         Immature Grans (Abs) 0.02  Comment: Mild elevation in immature granulocytes is non specific and   can be seen in a variety of conditions including stress response,   acute inflammation, trauma and pregnancy. Correlation with other   laboratory and clinical findings is essential.           Immature Granulocytes 0.3         Lymph # 2.1         Lymph % 30.4         Magnesium   2.1       MCH 30.1         MCHC 33.2         MCV 91         Mono # 1.0         Mono % 13.8         MPV 10.0         nRBC 0         Platelets 443         Potassium   5.2       PROTEIN TOTAL   5.1       RBC 2.72         RDW 15.9         Sodium   136       WBC 6.90                 Significant Imaging: I have reviewed all pertinent imaging results/findings within the past 24 hours.      Assessment/Plan:      * Spinal stenosis of lumbar region with neurogenic claudication  Continue inpatient rehab efforts      Hematoma  Pressure dressing      BPH with obstruction/lower urinary tract symptoms  Nixon catheter in place      Hyperkalemia  Trending down. Monitoring labs.    Hyponatremia  Improved. Monitor regular labs.      Acute renal failure  Monitor regular labs and urine output      Wenckebach's phenomenon, heart block  Stable      Hypertension  Continue home meds. Monitor blood pressure and adjust meds PRN.        VTE Risk Mitigation (From admission,  onward)         Ordered     enoxaparin injection 30 mg  Daily         03/28/22 1444     IP VTE HIGH RISK PATIENT  Once         03/28/22 1432     Place sequential compression device  Until discontinued         03/28/22 1432                Discharge Planning   MARCOS:      Code Status: Full Code   Is the patient medically ready for discharge?:     Reason for patient still in hospital (select all that apply): Patient trending condition, Laboratory test and Treatment  Discharge Plan A: Home Health                  Madiha Brown NP  Department of Hospital Medicine   [facility] ([facility])

## 2022-03-31 NOTE — PT/OT/SLP PROGRESS
Occupational Therapy  Treatment    Maurice Roy   MRN: [id number]   Admitting Diagnosis: Spinal stenosis of lumbar region with neurogenic claudication    OT Date of Treatment: 03/31/22   AM Start Time: na  AM End Time: na  PM Start Time: 1230  PM End Time: 1400  Treatment Type: Individual 60 and Concurrent 30  Total Time (min): 90 min      Billable Minutes:  Therapeutic Activity 30 and Therapeutic Exercise 60  Total Minutes: 90    General Precautions: Standard,    Orthopedic Precautions: spinal precautions  Braces:      Spiritual, Cultural Beliefs, Druze Practices, Values that Affect Care: no    Subjective:  Communicated with nurse prior to session.    Pain/Comfort  Pain Rating 1: 5/10  Location - Side 1: Bilateral  Location - Orientation 1: lower  Location 1: back  Pain Addressed 1: Reposition  Pain Rating Post-Intervention 1: 2/10    Objective:  Pt was cooperative and motivated without verbal encouragement while exhibiting positive affect. He participated in functional transfer retraining throughout session to / from wheelchair, toilet, and chair emphasizing fall prevention providing extra time with repetition requiring min assist secondary to steadying assist with mod verbal and tactile cueing for safety awareness and technique utilizing RW. Pt then participated in therapeutic exercise performing 5x13 seated pushups requiring decreasing lifting assist, and verbal and tactile cueing for technique challenging him to lift buttocks off seated surface to end range elbow extension in order to strengthen triceps brachii to improve performance with sit <> stand transitions. Next, he participated in therapeutic exercise performing 3x15 bilateral UE proximal strengthening exercises with scapular retraction, shoulder extension, shoulder adduction, horizontal abduction, shoulder flexion in plane of scaption, internal rotation, and external rotation while seated in chair unsupported requiring continuous verbal and  tactile cueing for technique while sustaining Spinal Precautions.    Functional Mobility:  Transfer Training:   Sit to stand:Minimal Assistance with Rolling Walker .  Bed <> Chair:  Step Transfer with Minimal Assistance with Rolling Walker .  Toilet Transfer:  Pt Step Transfer with Minimal Assistance with Grab bars .    Balance:   Static Sit: GOOD-: Takes MODERATE challenges from all directions but inconsistently  Dynamic Sit:  FAIR: Cannot move trunk without losing balance  Static Stand: FAIR: Maintains without assist but unable to take challenges  Dynamic stand: FAIR: Needs CONTACT GUARD during gait      Patient left supine with call button in reach and nurse notified    ASSESSMENT:  Pt demonstrated improved functional performance with transfers as noted by min assist in which patient did not require lifting or lowering assist on this date, but mostly steadying assist with verbal and tactile cueing. However, patient needs to demonstrate more consistency particularly when fatigued.      Rehab potential is good    Activity tolerance: Fair    Discharge recommendations: other (see comments) (To be further determined based on progress prior to discharge.)     Equipment recommendations: other (see comments) (To be further determined based on progress prior to discharge.)     GOALS:   Multidisciplinary Problems     Occupational Therapy Goals        Problem: Occupational Therapy    Goal Priority Disciplines Outcome Interventions   Occupational Therapy Goal     OT, PT/OT     Description: Long Term Goals to be met by: 04/11/22     Patient will increase functional independence with ADLs by performing:    Feeding with [place].  UE Dressing with Modified [place].  LE Dressing with Modified [place].  Grooming while seated at sink with Modified [place].  Toileting from toilet with Modified [place] for hygiene and clothing management.   Bathing from  shower chair/bench with Modified [place].  Step  transfer with Modified [place]  Toilet transfer to toilet with Modified [place].  Increased functional strength to 4+/5 for bilateral UE's.                     Plan:  Patient to be seen 5 x/week (for 90 min per day for 14 days) to address the above listed problems via self-care/home management, community/work re-entry, therapeutic activities, therapeutic exercises  Plan of Care expires: 04/11/22  Plan of Care reviewed with: patient         03/31/2022

## 2022-04-01 LAB
ANION GAP SERPL CALC-SCNC: 4 MMOL/L (ref 8–16)
BUN SERPL-MCNC: 57 MG/DL (ref 10–30)
CALCIUM SERPL-MCNC: 8.7 MG/DL (ref 8.7–10.5)
CHLORIDE SERPL-SCNC: 107 MMOL/L (ref 95–110)
CO2 SERPL-SCNC: 24 MMOL/L (ref 23–29)
CREAT SERPL-MCNC: 1.8 MG/DL (ref 0.5–1.4)
EST. GFR  (AFRICAN AMERICAN): 36.9 ML/MIN/1.73 M^2
EST. GFR  (NON AFRICAN AMERICAN): 32 ML/MIN/1.73 M^2
GLUCOSE SERPL-MCNC: 90 MG/DL (ref 70–110)
POTASSIUM SERPL-SCNC: 5.3 MMOL/L (ref 3.5–5.1)
SODIUM SERPL-SCNC: 135 MMOL/L (ref 136–145)

## 2022-04-01 PROCEDURE — 11000001 HC ACUTE MED/SURG PRIVATE ROOM

## 2022-04-01 PROCEDURE — 97535 SELF CARE MNGMENT TRAINING: CPT

## 2022-04-01 PROCEDURE — 97110 THERAPEUTIC EXERCISES: CPT

## 2022-04-01 PROCEDURE — 63600175 PHARM REV CODE 636 W HCPCS: Performed by: INTERNAL MEDICINE

## 2022-04-01 PROCEDURE — 25000003 PHARM REV CODE 250: Performed by: INTERNAL MEDICINE

## 2022-04-01 PROCEDURE — 36415 COLL VENOUS BLD VENIPUNCTURE: CPT | Performed by: NURSE PRACTITIONER

## 2022-04-01 PROCEDURE — 80048 BASIC METABOLIC PNL TOTAL CA: CPT | Performed by: NURSE PRACTITIONER

## 2022-04-01 RX ADMIN — MIRTAZAPINE 7.5 MG: 7.5 TABLET ORAL at 09:04

## 2022-04-01 RX ADMIN — Medication 1000 UNITS: at 08:04

## 2022-04-01 RX ADMIN — POLYETHYLENE GLYCOL (3350) 17 G: 17 POWDER, FOR SOLUTION ORAL at 08:04

## 2022-04-01 RX ADMIN — MUPIROCIN: 20 OINTMENT TOPICAL at 08:04

## 2022-04-01 RX ADMIN — TAMSULOSIN HYDROCHLORIDE 0.4 MG: 0.4 CAPSULE ORAL at 08:04

## 2022-04-01 RX ADMIN — FENOFIBRATE 145 MG: 145 TABLET, FILM COATED ORAL at 09:04

## 2022-04-01 RX ADMIN — TAMSULOSIN HYDROCHLORIDE 0.4 MG: 0.4 CAPSULE ORAL at 09:04

## 2022-04-01 RX ADMIN — ENOXAPARIN SODIUM 30 MG: 30 INJECTION SUBCUTANEOUS at 05:04

## 2022-04-01 RX ADMIN — THERA TABS 1 TABLET: TAB at 08:04

## 2022-04-01 RX ADMIN — AMLODIPINE BESYLATE 5 MG: 5 TABLET ORAL at 09:04

## 2022-04-01 RX ADMIN — ASPIRIN 81 MG: 81 TABLET, COATED ORAL at 08:04

## 2022-04-01 RX ADMIN — MUPIROCIN: 20 OINTMENT TOPICAL at 09:04

## 2022-04-01 NOTE — ASSESSMENT & PLAN NOTE
Attempted removal of chavarria catheter 3/31. Resulted in urinary retention of 300ml and inability to urinate. New chavarria catheter instilled.

## 2022-04-01 NOTE — SUBJECTIVE & OBJECTIVE
Interval History: Pt seen and evaluated    Review of Systems   Constitutional:  Positive for activity change, appetite change and fatigue. Negative for chills and fever.   HENT:  Negative for congestion, drooling, ear pain, mouth sores, nosebleeds, sinus pressure, sinus pain and sore throat.    Eyes:  Negative for discharge, itching and visual disturbance.   Respiratory:  Negative for apnea, cough, chest tightness and wheezing.    Cardiovascular:  Negative for chest pain and palpitations.   Gastrointestinal:  Negative for abdominal distention, abdominal pain, blood in stool, diarrhea, nausea and vomiting.   Endocrine: Positive for cold intolerance. Negative for heat intolerance and polyphagia.   Genitourinary:  Positive for difficulty urinating. Negative for dysuria, flank pain and frequency.   Musculoskeletal:  Positive for arthralgias and back pain. Negative for myalgias.   Skin:  Negative for rash.   Neurological:  Positive for tremors and weakness. Negative for dizziness, seizures, syncope, facial asymmetry, speech difficulty and headaches.   Hematological:  Negative for adenopathy.   Psychiatric/Behavioral:  Negative for agitation, confusion and hallucinations. The patient is not nervous/anxious.    Objective:     Vital Signs (Most Recent):  Temp: 98 °F (36.7 °C) (03/31/22 2233)  Pulse: 86 (03/31/22 2233)  Resp: 20 (03/31/22 2233)  BP: (!) 190/79 (03/31/22 2233)  SpO2: 98 % (03/31/22 2233)   Vital Signs (24h Range):  Temp:  [98 °F (36.7 °C)-98.6 °F (37 °C)] 98 °F (36.7 °C)  Pulse:  [86-88] 86  Resp:  [20] 20  SpO2:  [98 %] 98 %  BP: (190-200)/(79-81) 190/79     Weight: 80.7 kg (178 lb)  Body mass index is 28.73 kg/m².    Intake/Output Summary (Last 24 hours) at 4/1/2022 1334  Last data filed at 4/1/2022 1200  Gross per 24 hour   Intake 1620 ml   Output 1515 ml   Net 105 ml        Physical Exam  Constitutional:       General: He is awake. He is not in acute distress.     Appearance: Normal appearance. He is  ill-appearing. He is not toxic-appearing or diaphoretic.   HENT:      Head: Normocephalic and atraumatic.      Nose: Nose normal. No congestion or rhinorrhea.      Mouth/Throat:      Mouth: Mucous membranes are moist.      Pharynx: Oropharynx is clear. No oropharyngeal exudate or posterior oropharyngeal erythema.   Eyes:      General: No scleral icterus.        Right eye: No discharge.         Left eye: No discharge.      Extraocular Movements: Extraocular movements intact.      Pupils: Pupils are equal, round, and reactive to light.   Neck:      Thyroid: No thyroid mass or thyromegaly.      Vascular: No carotid bruit.      Meningeal: Brudzinski's sign and Kernig's sign absent.   Cardiovascular:      Rate and Rhythm: Normal rate and regular rhythm.      Chest Wall: PMI is not displaced. No thrill.      Pulses: Normal pulses.      Heart sounds: Normal heart sounds. No murmur heard.    No friction rub. No gallop.   Pulmonary:      Effort: Pulmonary effort is normal. No tachypnea, accessory muscle usage, prolonged expiration or respiratory distress.      Breath sounds: Normal breath sounds. No stridor or decreased air movement. No wheezing, rhonchi or rales.   Chest:      Chest wall: No tenderness.   Abdominal:      General: Bowel sounds are normal. There is no distension.      Palpations: Abdomen is soft. There is no hepatomegaly, splenomegaly or mass.      Tenderness: There is no abdominal tenderness. There is no right CVA tenderness, left CVA tenderness, guarding or rebound.      Hernia: No hernia is present.   Musculoskeletal:         General: No swelling, tenderness, deformity or signs of injury.      Cervical back: Normal range of motion and neck supple. No rigidity. No muscular tenderness.   Lymphadenopathy:      Cervical: No cervical adenopathy.   Skin:     General: Skin is warm.      Capillary Refill: Capillary refill takes less than 2 seconds.      Coloration: Skin is not cyanotic, jaundiced or pale.       Findings: Bruising, erythema and lesion present. No petechiae or rash.   Neurological:      Mental Status: He is alert and oriented to person, place, and time.      Cranial Nerves: No cranial nerve deficit, dysarthria or facial asymmetry.      Motor: Weakness present. No tremor.      Gait: Gait abnormal.   Psychiatric:         Mood and Affect: Mood normal. Mood is not anxious or depressed. Affect is not flat.         Speech: Speech is not rapid and pressured or slurred.         Behavior: Behavior normal. Behavior is not agitated, aggressive or combative.         Thought Content: Thought content normal. Thought content is not paranoid or delusional.         Cognition and Memory: Cognition is not impaired. Memory is not impaired.         Judgment: Judgment normal.       Significant Labs: All pertinent labs within the past 24 hours have been reviewed.  Recent Lab Results         04/01/22  0546        Anion Gap 4       BUN 57       Calcium 8.7       Chloride 107       CO2 24       Creatinine 1.8       eGFR if African American 36.9       eGFR if non  32.0  Comment: Calculation used to obtain the estimated glomerular filtration  rate (eGFR) is the CKD-EPI equation.          Glucose 90       Potassium 5.3       Sodium 135               Significant Imaging: I have reviewed all pertinent imaging results/findings within the past 24 hours.

## 2022-04-01 NOTE — PT/OT/SLP PROGRESS
Occupational Therapy  Weekly Progress Note    Maurice Roy   MRN: [id number]   Admitting Diagnosis: Spinal stenosis of lumbar region with neurogenic claudication    OT Date of Treatment: 04/01/22   AM Start Time: na  AM End Time: na  PM Start Time: 1245  PM End Time: 1415  Treatment Type: Individual 60 and Concurrent 30  Total Time (min): 90 min      Billable Minutes:  Self Care/Home Management 60 and Therapeutic Exercise 30  Total Minutes: 90    General Precautions: Standard,    Orthopedic Precautions: spinal precautions  Braces: TLSO    Spiritual, Cultural Beliefs, Scientology Practices, Values that Affect Care: no    Subjective:  Communicated with nurse prior to session.    Pain/Comfort  Pain Rating 1: 5/10  Location - Orientation 1: lower  Location 1: back  Pain Addressed 1: Reposition, Cessation of Activity, Nurse notified  Pain Rating Post-Intervention 1: 2/10    Objective:  Patient found with: chavarria catheter. Pt was cooperative and motivated with minimal verbal encouragement while exhibiting positive affect. He participated in extensive ADL retraining as further noted below emphasizing fall prevention, and use of compensatory strategies, assistive devices, and adaptive equipment in order to sustain Spinal Precautions providing extra time requiring mod verbal and tactile cueing for safety awareness and technique. Also, he participated in therapeutic exercise performing 5x14 seated pushups requiring decreased lifting assist, and verbal and tactile cueing for technique challenging him to lift buttocks off seated surface to end range elbow extension in order to strengthen triceps brachii to improve performance with sit <> stand transitions.    Functional Mobility:  Bed Mobility:   Supine to sit: Minimal Assistance   Sit to supine: Moderate Assistance   Rolling: Minimal Assistance   Scooting: Minimal Assistance    Transfer Training:   Sit to stand:Minimal Assistance with Rolling Walker .  Bed <> Chair:  Step  Transfer with Minimal Assistance with Rolling Walker .  Toilet Transfer:  Pt Step Transfer with Minimal Assistance with Grab bars .  Patient performed shower transfer Step Transfer with Minimal Assistance with Grab bars and shower chair.    Feeding:  Patient performed feeding with Independent.    Grooming:  Patient peformed hand washing with Setup Assistance at sitting at sink.  Patient performed face washing with Setup Assistance at sitting at sink.  Patient performed oral hygeine with Setup Assistance at sitting at sink.  Patient performe hair grooming with Setup Assistance at sitting at sink.    Bathing:  Patient performed bathing with Minimal Assistance with grab bar, Handheld shower head, long-handled sponge and shower chair at Shower.    UE Dressing:  Patient performed UE Dressing with Minimal Assistance with extra time at Edge of bed.    LE Dressing:  Patient don/doffed socks with Moderate Assistance, Patient don/doffed shoes with Moderate Assistance, Patient performed don/doffed adult brief with Moderate Assistance and Patient performed don/doffed pants with Moderate Assistance    Toilet Training:  Pt performed toileting with Moderate Assistance with Grab  bar at [employer].    Balance:   Static Sit: GOOD-: Takes MODERATE challenges from all directions but inconsistently  Dynamic Sit:  FAIR: Cannot move trunk without losing balance  Static Stand: FAIR: Maintains without assist but unable to take challenges  Dynamic stand: FAIR: Needs CONTACT GUARD during gait      Patient left up in chair with nurse notified    ASSESSMENT:  Pt demonstrated progress with functional goals as noted by changes in functional scores in which patient able to achieve 7/8 STG's and 1/9 LTG's. Pt now mod to min assist with ADL's including functional transfers with extra time, and use of assistive devices, adaptive equipment, and compensatory strategies in order to sustain Spinal Precautions.     Rehab potential is good    Activity  tolerance: Fair    Discharge recommendations: other (see comments) (To be further determined based on progress prior to discharge.)     Equipment recommendations: other (see comments) (To be further determined based on progress prior to discharge.)     GOALS:   Short Term Goals to be met by: 04/04/22      Patient will increase functional independence with ADLs by performing:     UE Dressing with Minimal Assistance. MET  LE Dressing with Moderate Assistance. MET  Grooming while seated at sink with Set-up Assistance. MET  Toileting from toilet with Minimal Assistance for hygiene and clothing management. ONGOING - Mod Assist  Bathing from  shower chair/bench with Minimal Assistance. MET  Step transfer with Minimal Assistance. MET  Toilet transfer to toilet with Minimal Assistance. MET  Increased functional strength to 4/5 for bilateral UE's. MET     Long Term Goals to be met by: 04/11/22      Patient will increase functional independence with ADLs by performing:     Feeding with [place]. MET   UE Dressing with Modified [place]. ONGOING  LE Dressing with Modified [place]. ONGOING  Grooming while seated at sink with Modified [place]. ONGOING  Toileting from toilet with Modified [place] for hygiene and clothing management. ONGOING   Bathing from  shower chair/bench with Modified [place]. ONGOING  Step transfer with Modified [place]. ONGOING  Toilet transfer to toilet with Modified [place]. ONGOING  Increased functional strength to 4+/5 for bilateral UE's. ONGOING    Plan:  Patient to be seen 5 x/week (for 90 min per day for 14 days) to address the above listed problems via self-care/home management, community/work re-entry, therapeutic activities, therapeutic exercises  Plan of Care expires: 04/11/22  Plan of Care reviewed with: patient         04/01/2022

## 2022-04-01 NOTE — PLAN OF CARE
Problem: Impaired Wound Healing  Goal: Optimal Wound Healing  Outcome: Ongoing, Progressing     Problem: Skin Injury Risk Increased  Goal: Skin Health and Integrity  Outcome: Ongoing, Progressing     Problem: Fall Injury Risk  Goal: Absence of Fall and Fall-Related Injury  Outcome: Ongoing, Progressing

## 2022-04-01 NOTE — PLAN OF CARE
Problem: Adult Inpatient Plan of Care  Goal: Plan of Care Review  4/1/2022 0637 by Tessie Jordan LPN  Outcome: Ongoing, Progressing  4/1/2022 0634 by Tessie Jordan LPN  Outcome: Ongoing, Progressing  Goal: Patient-Specific Goal (Individualized)  4/1/2022 0637 by Tessie Jordan LPN  Outcome: Ongoing, Progressing  4/1/2022 0634 by Tessie Jordan LPN  Outcome: Ongoing, Progressing  Goal: Absence of Hospital-Acquired Illness or Injury  4/1/2022 0637 by Tessie Jordan LPN  Outcome: Ongoing, Progressing  4/1/2022 0634 by Tessie Jordan LPN  Outcome: Ongoing, Progressing  Goal: Optimal Comfort and Wellbeing  4/1/2022 0637 by Tessie Jordan LPN  Outcome: Ongoing, Progressing  4/1/2022 0634 by Tessie Jordan LPN  Outcome: Ongoing, Progressing  Goal: Readiness for Transition of Care  4/1/2022 0637 by Tessie Jordan LPN  Outcome: Ongoing, Progressing  4/1/2022 0634 by Tessie Jordan LPN  Outcome: Ongoing, Progressing

## 2022-04-01 NOTE — PROGRESS NOTES
[facility] Medicine  Progress Note    Patient Name: Maurice Roy  MRN: [id number]  Patient Class: IP- Rehab   Admission Date: 3/28/2022  Length of Stay: 4 days  Attending Physician: Freeman Kathleen III, MD  Primary Care Provider: Marleni Castillo MD        Subjective:     Principal Problem:Spinal stenosis of lumbar region with neurogenic claudication        HPI:  Patient is a 89-year-old male with a history of generalized arthritis prostate cancer hypertension and lower back pain.  The patient has a history of spinal stenosis and chronic lower back related issues.  At an outside facility the patient underwent a L4-L5 laminectomy and a foraminotomy at L4-L5 and S5-L5 S1.  Postoperatively the patient had pain in the back and tenderness at the surgical site.  His lower extremity weakness and neuropathic type symptoms improved after surgery.  Patient was seen by primary care after surgery and he was placed on antibiotics.  He was also started on antiplatelets and anti-platelet agents postoperatively.  Patient did have an episode of urinary tension postoperatively requiring a Nixon catheter placement urologic consultation and initiation of BPH medications.  Postop the patient also had worsening tremors was seen by Neurology had an abnormality on EKG and was seen by Cardiology and had several medication changes.  Patient also has had several electrolyte abnormalities requiring treatment.  Slowly the patient began to stabilize and he began therapy and he was referred to our inpatient rehab unit for close monitoring routine blood work close physician oversight and rehabilitative care.  I evaluated the patient this morning on the exercise bike he is very eager and motivated to complete therapy and return home.  The patient was still functioning at a modified independent functional state and is hoping to get back to goal of return home.  The patient has multiple skin tear is requiring wound  treatment as well as a hematoma to the right forearm.  Patient is also having dyspnea with speaking and moderate exertion.  Patient's chart has been reviewed feel is an excellent candidate for our unit.        Overview/Hospital Course:  3/30 SD: Pt laying in bed. Localized hematoma  to right forearm - treating with pressure dressing. Denies any complaints. Continue IPR efforts.    3/31 SD: Pt dressing, sitting in wheelchair in room. Wearing back brace. Positive attitude. Bladder training complete. Remove chavarria catheter today.    4/1 SD: Pt sitting on side of bed getting dressed with assistance, then transitions to wheelchair for therapy. Pt had urinary retention of 300ml and inability to urinate after removal of chavarria catheter. A new chavarria catheter was instilled.      Interval History: Pt seen and evaluated    Review of Systems   Constitutional:  Positive for activity change, appetite change and fatigue. Negative for chills and fever.   HENT:  Negative for congestion, drooling, ear pain, mouth sores, nosebleeds, sinus pressure, sinus pain and sore throat.    Eyes:  Negative for discharge, itching and visual disturbance.   Respiratory:  Negative for apnea, cough, chest tightness and wheezing.    Cardiovascular:  Negative for chest pain and palpitations.   Gastrointestinal:  Negative for abdominal distention, abdominal pain, blood in stool, diarrhea, nausea and vomiting.   Endocrine: Positive for cold intolerance. Negative for heat intolerance and polyphagia.   Genitourinary:  Positive for difficulty urinating. Negative for dysuria, flank pain and frequency.   Musculoskeletal:  Positive for arthralgias and back pain. Negative for myalgias.   Skin:  Negative for rash.   Neurological:  Positive for tremors and weakness. Negative for dizziness, seizures, syncope, facial asymmetry, speech difficulty and headaches.   Hematological:  Negative for adenopathy.   Psychiatric/Behavioral:  Negative for agitation, confusion and  hallucinations. The patient is not nervous/anxious.    Objective:     Vital Signs (Most Recent):  Temp: 98 °F (36.7 °C) (03/31/22 2233)  Pulse: 86 (03/31/22 2233)  Resp: 20 (03/31/22 2233)  BP: (!) 190/79 (03/31/22 2233)  SpO2: 98 % (03/31/22 2233)   Vital Signs (24h Range):  Temp:  [98 °F (36.7 °C)-98.6 °F (37 °C)] 98 °F (36.7 °C)  Pulse:  [86-88] 86  Resp:  [20] 20  SpO2:  [98 %] 98 %  BP: (190-200)/(79-81) 190/79     Weight: 80.7 kg (178 lb)  Body mass index is 28.73 kg/m².    Intake/Output Summary (Last 24 hours) at 4/1/2022 1334  Last data filed at 4/1/2022 1200  Gross per 24 hour   Intake 1620 ml   Output 1515 ml   Net 105 ml        Physical Exam  Constitutional:       General: He is awake. He is not in acute distress.     Appearance: Normal appearance. He is ill-appearing. He is not toxic-appearing or diaphoretic.   HENT:      Head: Normocephalic and atraumatic.      Nose: Nose normal. No congestion or rhinorrhea.      Mouth/Throat:      Mouth: Mucous membranes are moist.      Pharynx: Oropharynx is clear. No oropharyngeal exudate or posterior oropharyngeal erythema.   Eyes:      General: No scleral icterus.        Right eye: No discharge.         Left eye: No discharge.      Extraocular Movements: Extraocular movements intact.      Pupils: Pupils are equal, round, and reactive to light.   Neck:      Thyroid: No thyroid mass or thyromegaly.      Vascular: No carotid bruit.      Meningeal: Brudzinski's sign and Kernig's sign absent.   Cardiovascular:      Rate and Rhythm: Normal rate and regular rhythm.      Chest Wall: PMI is not displaced. No thrill.      Pulses: Normal pulses.      Heart sounds: Normal heart sounds. No murmur heard.    No friction rub. No gallop.   Pulmonary:      Effort: Pulmonary effort is normal. No tachypnea, accessory muscle usage, prolonged expiration or respiratory distress.      Breath sounds: Normal breath sounds. No stridor or decreased air movement. No wheezing, rhonchi or rales.    Chest:      Chest wall: No tenderness.   Abdominal:      General: Bowel sounds are normal. There is no distension.      Palpations: Abdomen is soft. There is no hepatomegaly, splenomegaly or mass.      Tenderness: There is no abdominal tenderness. There is no right CVA tenderness, left CVA tenderness, guarding or rebound.      Hernia: No hernia is present.   Musculoskeletal:         General: No swelling, tenderness, deformity or signs of injury.      Cervical back: Normal range of motion and neck supple. No rigidity. No muscular tenderness.   Lymphadenopathy:      Cervical: No cervical adenopathy.   Skin:     General: Skin is warm.      Capillary Refill: Capillary refill takes less than 2 seconds.      Coloration: Skin is not cyanotic, jaundiced or pale.      Findings: Bruising, erythema and lesion present. No petechiae or rash.   Neurological:      Mental Status: He is alert and oriented to person, place, and time.      Cranial Nerves: No cranial nerve deficit, dysarthria or facial asymmetry.      Motor: Weakness present. No tremor.      Gait: Gait abnormal.   Psychiatric:         Mood and Affect: Mood normal. Mood is not anxious or depressed. Affect is not flat.         Speech: Speech is not rapid and pressured or slurred.         Behavior: Behavior normal. Behavior is not agitated, aggressive or combative.         Thought Content: Thought content normal. Thought content is not paranoid or delusional.         Cognition and Memory: Cognition is not impaired. Memory is not impaired.         Judgment: Judgment normal.       Significant Labs: All pertinent labs within the past 24 hours have been reviewed.  Recent Lab Results         04/01/22  0546        Anion Gap 4       BUN 57       Calcium 8.7       Chloride 107       CO2 24       Creatinine 1.8       eGFR if African American 36.9       eGFR if non  32.0  Comment: Calculation used to obtain the estimated glomerular filtration  rate (eGFR) is the  CKD-EPI equation.          Glucose 90       Potassium 5.3       Sodium 135               Significant Imaging: I have reviewed all pertinent imaging results/findings within the past 24 hours.      Assessment/Plan:      * Spinal stenosis of lumbar region with neurogenic claudication  Continue inpatient rehab efforts      Hematoma  Right forearm  Improved      BPH with obstruction/lower urinary tract symptoms  Attempted removal of chavarria catheter 3/31. Resulted in urinary retention of 300ml and inability to urinate. New chavarria catheter instilled.    Hyperkalemia  Monitoring daily    Hyponatremia  Improved. Monitor regular labs.      Acute renal failure  Monitor regular labs and urine output      Wenckebach's phenomenon, heart block  Stable      Hypertension  Continue home meds. Monitor blood pressure and adjust meds PRN.        VTE Risk Mitigation (From admission, onward)         Ordered     enoxaparin injection 30 mg  Daily         03/28/22 1444     IP VTE HIGH RISK PATIENT  Once         03/28/22 1432     Place sequential compression device  Until discontinued         03/28/22 1432                Discharge Planning   MARCOS:      Code Status: Full Code   Is the patient medically ready for discharge?:     Reason for patient still in hospital (select all that apply): Patient trending condition, Laboratory test, Treatment and PT / OT recommendations  Discharge Plan A: Home Health                  Madiha Brown NP  Department of Hospital Medicine   [facility] ([facility])

## 2022-04-02 LAB
ANION GAP SERPL CALC-SCNC: 5 MMOL/L (ref 8–16)
BUN SERPL-MCNC: 59 MG/DL (ref 10–30)
CALCIUM SERPL-MCNC: 8.7 MG/DL (ref 8.7–10.5)
CHLORIDE SERPL-SCNC: 106 MMOL/L (ref 95–110)
CO2 SERPL-SCNC: 25 MMOL/L (ref 23–29)
CREAT SERPL-MCNC: 1.7 MG/DL (ref 0.5–1.4)
EST. GFR  (AFRICAN AMERICAN): 39.6 ML/MIN/1.73 M^2
EST. GFR  (NON AFRICAN AMERICAN): 34.2 ML/MIN/1.73 M^2
GLUCOSE SERPL-MCNC: 95 MG/DL (ref 70–110)
POTASSIUM SERPL-SCNC: 4.9 MMOL/L (ref 3.5–5.1)
SODIUM SERPL-SCNC: 136 MMOL/L (ref 136–145)

## 2022-04-02 PROCEDURE — 97535 SELF CARE MNGMENT TRAINING: CPT

## 2022-04-02 PROCEDURE — 80048 BASIC METABOLIC PNL TOTAL CA: CPT | Performed by: NURSE PRACTITIONER

## 2022-04-02 PROCEDURE — 25000003 PHARM REV CODE 250: Performed by: INTERNAL MEDICINE

## 2022-04-02 PROCEDURE — 11000001 HC ACUTE MED/SURG PRIVATE ROOM

## 2022-04-02 PROCEDURE — 63600175 PHARM REV CODE 636 W HCPCS: Performed by: INTERNAL MEDICINE

## 2022-04-02 PROCEDURE — 97110 THERAPEUTIC EXERCISES: CPT

## 2022-04-02 PROCEDURE — 36415 COLL VENOUS BLD VENIPUNCTURE: CPT | Performed by: NURSE PRACTITIONER

## 2022-04-02 PROCEDURE — 97530 THERAPEUTIC ACTIVITIES: CPT

## 2022-04-02 RX ADMIN — THERA TABS 1 TABLET: TAB at 09:04

## 2022-04-02 RX ADMIN — AMLODIPINE BESYLATE 5 MG: 5 TABLET ORAL at 09:04

## 2022-04-02 RX ADMIN — POLYETHYLENE GLYCOL (3350) 17 G: 17 POWDER, FOR SOLUTION ORAL at 09:04

## 2022-04-02 RX ADMIN — FENOFIBRATE 145 MG: 145 TABLET, FILM COATED ORAL at 09:04

## 2022-04-02 RX ADMIN — Medication 1000 UNITS: at 09:04

## 2022-04-02 RX ADMIN — TAMSULOSIN HYDROCHLORIDE 0.4 MG: 0.4 CAPSULE ORAL at 09:04

## 2022-04-02 RX ADMIN — ASPIRIN 81 MG: 81 TABLET, COATED ORAL at 09:04

## 2022-04-02 RX ADMIN — MIRTAZAPINE 7.5 MG: 7.5 TABLET ORAL at 09:04

## 2022-04-02 RX ADMIN — ENOXAPARIN SODIUM 30 MG: 30 INJECTION SUBCUTANEOUS at 05:04

## 2022-04-02 RX ADMIN — MUPIROCIN: 20 OINTMENT TOPICAL at 09:04

## 2022-04-02 NOTE — PLAN OF CARE
Problem: Adult Inpatient Plan of Care  Goal: Plan of Care Review  Outcome: Ongoing, Progressing  Goal: Patient-Specific Goal (Individualized)  Outcome: Ongoing, Progressing  Goal: Absence of Hospital-Acquired Illness or Injury  Outcome: Ongoing, Progressing  Goal: Optimal Comfort and Wellbeing  Outcome: Ongoing, Progressing  Goal: Readiness for Transition of Care  Outcome: Ongoing, Progressing     Problem: Oral Intake Inadequate (Acute Kidney Injury/Impairment)  Goal: Optimal Nutrition Intake  Outcome: Ongoing, Progressing     Problem: Renal Function Impairment (Acute Kidney Injury/Impairment)  Goal: Effective Renal Function  Outcome: Ongoing, Progressing   Will continue to monitor and will maintain a safe environment

## 2022-04-02 NOTE — SUBJECTIVE & OBJECTIVE
Interval History: Pt seen and evaluated    Review of Systems   Constitutional:  Positive for activity change, appetite change and fatigue. Negative for chills and fever.   HENT:  Negative for congestion, drooling, ear pain, mouth sores, nosebleeds, sinus pressure, sinus pain and sore throat.    Eyes:  Negative for discharge, itching and visual disturbance.   Respiratory:  Negative for apnea, cough, chest tightness and wheezing.    Cardiovascular:  Negative for chest pain and palpitations.   Gastrointestinal:  Negative for abdominal distention, abdominal pain, blood in stool, diarrhea, nausea and vomiting.   Endocrine: Positive for cold intolerance. Negative for heat intolerance and polyphagia.   Genitourinary:  Positive for difficulty urinating. Negative for dysuria, flank pain and frequency.   Musculoskeletal:  Positive for arthralgias and back pain. Negative for myalgias.   Skin:  Negative for rash.   Neurological:  Positive for tremors and weakness. Negative for dizziness, seizures, syncope, facial asymmetry, speech difficulty and headaches.   Hematological:  Negative for adenopathy.   Psychiatric/Behavioral:  Negative for agitation, confusion and hallucinations. The patient is not nervous/anxious.    Objective:     Vital Signs (Most Recent):  Temp: 98 °F (36.7 °C) (04/02/22 0042)  Pulse: 85 (04/02/22 0042)  Resp: 20 (04/02/22 0042)  BP: (!) 147/72 (04/02/22 0042)  SpO2: 99 % (04/02/22 0042)   Vital Signs (24h Range):  Temp:  [97.8 °F (36.6 °C)-98 °F (36.7 °C)] 98 °F (36.7 °C)  Pulse:  [] 85  Resp:  [18-20] 20  SpO2:  [99 %] 99 %  BP: (147)/(72) 147/72     Weight: 80.7 kg (178 lb)  Body mass index is 28.73 kg/m².    Intake/Output Summary (Last 24 hours) at 4/2/2022 1150  Last data filed at 4/2/2022 1000  Gross per 24 hour   Intake 1140 ml   Output 1700 ml   Net -560 ml        Physical Exam  Constitutional:       General: He is awake. He is not in acute distress.     Appearance: Normal appearance. He is  ill-appearing. He is not toxic-appearing or diaphoretic.   HENT:      Head: Normocephalic and atraumatic.      Nose: Nose normal. No congestion or rhinorrhea.      Mouth/Throat:      Mouth: Mucous membranes are moist.      Pharynx: Oropharynx is clear. No oropharyngeal exudate or posterior oropharyngeal erythema.   Eyes:      General: No scleral icterus.        Right eye: No discharge.         Left eye: No discharge.      Extraocular Movements: Extraocular movements intact.      Pupils: Pupils are equal, round, and reactive to light.   Neck:      Thyroid: No thyroid mass or thyromegaly.      Vascular: No carotid bruit.      Meningeal: Brudzinski's sign and Kernig's sign absent.   Cardiovascular:      Rate and Rhythm: Normal rate and regular rhythm.      Chest Wall: PMI is not displaced. No thrill.      Pulses: Normal pulses.      Heart sounds: Normal heart sounds. No murmur heard.    No friction rub. No gallop.   Pulmonary:      Effort: Pulmonary effort is normal. No tachypnea, accessory muscle usage, prolonged expiration or respiratory distress.      Breath sounds: Normal breath sounds. No stridor or decreased air movement. No wheezing, rhonchi or rales.   Chest:      Chest wall: No tenderness.   Abdominal:      General: Bowel sounds are normal. There is no distension.      Palpations: Abdomen is soft. There is no hepatomegaly, splenomegaly or mass.      Tenderness: There is no abdominal tenderness. There is no right CVA tenderness, left CVA tenderness, guarding or rebound.      Hernia: No hernia is present.   Musculoskeletal:         General: No swelling, tenderness, deformity or signs of injury.      Cervical back: Normal range of motion and neck supple. No rigidity. No muscular tenderness.   Lymphadenopathy:      Cervical: No cervical adenopathy.   Skin:     General: Skin is warm.      Capillary Refill: Capillary refill takes less than 2 seconds.      Coloration: Skin is not cyanotic, jaundiced or pale.       Findings: Bruising, erythema and lesion present. No petechiae or rash.   Neurological:      Mental Status: He is alert and oriented to person, place, and time.      Cranial Nerves: No cranial nerve deficit, dysarthria or facial asymmetry.      Motor: Weakness present. No tremor.      Gait: Gait abnormal.   Psychiatric:         Mood and Affect: Mood normal. Mood is not anxious or depressed. Affect is not flat.         Speech: Speech is not rapid and pressured or slurred.         Behavior: Behavior normal. Behavior is not agitated, aggressive or combative.         Thought Content: Thought content normal. Thought content is not paranoid or delusional.         Cognition and Memory: Cognition is not impaired. Memory is not impaired.         Judgment: Judgment normal.       Significant Labs: All pertinent labs within the past 24 hours have been reviewed.  Recent Lab Results         04/02/22  0549        Anion Gap 5       BUN 59       Calcium 8.7       Chloride 106       CO2 25       Creatinine 1.7       eGFR if African American 39.6       eGFR if non  34.2  Comment: Calculation used to obtain the estimated glomerular filtration  rate (eGFR) is the CKD-EPI equation.          Glucose 95       Potassium 4.9       Sodium 136               Significant Imaging: I have reviewed all pertinent imaging results/findings within the past 24 hours.

## 2022-04-02 NOTE — PLAN OF CARE
Problem: Infection  Goal: Absence of Infection Signs and Symptoms  Outcome: Ongoing, Progressing     Problem: Skin Injury Risk Increased  Goal: Skin Health and Integrity  Outcome: Ongoing, Progressing     Problem: Fall Injury Risk  Goal: Absence of Fall and Fall-Related Injury  Outcome: Ongoing, Progressing

## 2022-04-02 NOTE — PROGRESS NOTES
[facility] Medicine  Progress Note    Patient Name: Maurice Roy  MRN: [id number]  Patient Class: IP- Rehab   Admission Date: 3/28/2022  Length of Stay: 5 days  Attending Physician: Freeman Kathleen III, MD  Primary Care Provider: Marleni Castillo MD        Subjective:     Principal Problem:Spinal stenosis of lumbar region with neurogenic claudication        HPI:  Patient is a 89-year-old male with a history of generalized arthritis prostate cancer hypertension and lower back pain.  The patient has a history of spinal stenosis and chronic lower back related issues.  At an outside facility the patient underwent a L4-L5 laminectomy and a foraminotomy at L4-L5 and S5-L5 S1.  Postoperatively the patient had pain in the back and tenderness at the surgical site.  His lower extremity weakness and neuropathic type symptoms improved after surgery.  Patient was seen by primary care after surgery and he was placed on antibiotics.  He was also started on antiplatelets and anti-platelet agents postoperatively.  Patient did have an episode of urinary tension postoperatively requiring a Nixon catheter placement urologic consultation and initiation of BPH medications.  Postop the patient also had worsening tremors was seen by Neurology had an abnormality on EKG and was seen by Cardiology and had several medication changes.  Patient also has had several electrolyte abnormalities requiring treatment.  Slowly the patient began to stabilize and he began therapy and he was referred to our inpatient rehab unit for close monitoring routine blood work close physician oversight and rehabilitative care.  I evaluated the patient this morning on the exercise bike he is very eager and motivated to complete therapy and return home.  The patient was still functioning at a modified independent functional state and is hoping to get back to goal of return home.  The patient has multiple skin tear is requiring wound  treatment as well as a hematoma to the right forearm.  Patient is also having dyspnea with speaking and moderate exertion.  Patient's chart has been reviewed feel is an excellent candidate for our unit.        Overview/Hospital Course:  3/30 SD: Pt laying in bed. Localized hematoma  to right forearm - treating with pressure dressing. Denies any complaints. Continue IPR efforts.    3/31 SD: Pt dressing, sitting in wheelchair in room. Wearing back brace. Positive attitude. Bladder training complete. Remove chavarria catheter today.    4/1 SD: Pt sitting on side of bed getting dressed with assistance, then transitions to wheelchair for therapy. Pt had urinary retention of 300ml and inability to urinate after removal of chavarria catheter. A new chavarria catheter was instilled.    4/2 WC:  stable without acute issues.      Interval History: Pt seen and evaluated    Review of Systems   Constitutional:  Positive for activity change, appetite change and fatigue. Negative for chills and fever.   HENT:  Negative for congestion, drooling, ear pain, mouth sores, nosebleeds, sinus pressure, sinus pain and sore throat.    Eyes:  Negative for discharge, itching and visual disturbance.   Respiratory:  Negative for apnea, cough, chest tightness and wheezing.    Cardiovascular:  Negative for chest pain and palpitations.   Gastrointestinal:  Negative for abdominal distention, abdominal pain, blood in stool, diarrhea, nausea and vomiting.   Endocrine: Positive for cold intolerance. Negative for heat intolerance and polyphagia.   Genitourinary:  Positive for difficulty urinating. Negative for dysuria, flank pain and frequency.   Musculoskeletal:  Positive for arthralgias and back pain. Negative for myalgias.   Skin:  Negative for rash.   Neurological:  Positive for tremors and weakness. Negative for dizziness, seizures, syncope, facial asymmetry, speech difficulty and headaches.   Hematological:  Negative for adenopathy.   Psychiatric/Behavioral:   Negative for agitation, confusion and hallucinations. The patient is not nervous/anxious.    Objective:     Vital Signs (Most Recent):  Temp: 98 °F (36.7 °C) (04/02/22 0042)  Pulse: 85 (04/02/22 0042)  Resp: 20 (04/02/22 0042)  BP: (!) 147/72 (04/02/22 0042)  SpO2: 99 % (04/02/22 0042)   Vital Signs (24h Range):  Temp:  [97.8 °F (36.6 °C)-98 °F (36.7 °C)] 98 °F (36.7 °C)  Pulse:  [] 85  Resp:  [18-20] 20  SpO2:  [99 %] 99 %  BP: (147)/(72) 147/72     Weight: 80.7 kg (178 lb)  Body mass index is 28.73 kg/m².    Intake/Output Summary (Last 24 hours) at 4/2/2022 1150  Last data filed at 4/2/2022 1000  Gross per 24 hour   Intake 1140 ml   Output 1700 ml   Net -560 ml        Physical Exam  Constitutional:       General: He is awake. He is not in acute distress.     Appearance: Normal appearance. He is ill-appearing. He is not toxic-appearing or diaphoretic.   HENT:      Head: Normocephalic and atraumatic.      Nose: Nose normal. No congestion or rhinorrhea.      Mouth/Throat:      Mouth: Mucous membranes are moist.      Pharynx: Oropharynx is clear. No oropharyngeal exudate or posterior oropharyngeal erythema.   Eyes:      General: No scleral icterus.        Right eye: No discharge.         Left eye: No discharge.      Extraocular Movements: Extraocular movements intact.      Pupils: Pupils are equal, round, and reactive to light.   Neck:      Thyroid: No thyroid mass or thyromegaly.      Vascular: No carotid bruit.      Meningeal: Brudzinski's sign and Kernig's sign absent.   Cardiovascular:      Rate and Rhythm: Normal rate and regular rhythm.      Chest Wall: PMI is not displaced. No thrill.      Pulses: Normal pulses.      Heart sounds: Normal heart sounds. No murmur heard.    No friction rub. No gallop.   Pulmonary:      Effort: Pulmonary effort is normal. No tachypnea, accessory muscle usage, prolonged expiration or respiratory distress.      Breath sounds: Normal breath sounds. No stridor or decreased air  movement. No wheezing, rhonchi or rales.   Chest:      Chest wall: No tenderness.   Abdominal:      General: Bowel sounds are normal. There is no distension.      Palpations: Abdomen is soft. There is no hepatomegaly, splenomegaly or mass.      Tenderness: There is no abdominal tenderness. There is no right CVA tenderness, left CVA tenderness, guarding or rebound.      Hernia: No hernia is present.   Musculoskeletal:         General: No swelling, tenderness, deformity or signs of injury.      Cervical back: Normal range of motion and neck supple. No rigidity. No muscular tenderness.   Lymphadenopathy:      Cervical: No cervical adenopathy.   Skin:     General: Skin is warm.      Capillary Refill: Capillary refill takes less than 2 seconds.      Coloration: Skin is not cyanotic, jaundiced or pale.      Findings: Bruising, erythema and lesion present. No petechiae or rash.   Neurological:      Mental Status: He is alert and oriented to person, place, and time.      Cranial Nerves: No cranial nerve deficit, dysarthria or facial asymmetry.      Motor: Weakness present. No tremor.      Gait: Gait abnormal.   Psychiatric:         Mood and Affect: Mood normal. Mood is not anxious or depressed. Affect is not flat.         Speech: Speech is not rapid and pressured or slurred.         Behavior: Behavior normal. Behavior is not agitated, aggressive or combative.         Thought Content: Thought content normal. Thought content is not paranoid or delusional.         Cognition and Memory: Cognition is not impaired. Memory is not impaired.         Judgment: Judgment normal.       Significant Labs: All pertinent labs within the past 24 hours have been reviewed.  Recent Lab Results         04/02/22  0549        Anion Gap 5       BUN 59       Calcium 8.7       Chloride 106       CO2 25       Creatinine 1.7       eGFR if African American 39.6       eGFR if non  34.2  Comment: Calculation used to obtain the estimated  glomerular filtration  rate (eGFR) is the CKD-EPI equation.          Glucose 95       Potassium 4.9       Sodium 136               Significant Imaging: I have reviewed all pertinent imaging results/findings within the past 24 hours.      Assessment/Plan:      * Spinal stenosis of lumbar region with neurogenic claudication  Continue inpatient rehab efforts      Hematoma  Right forearm  Improved      BPH with obstruction/lower urinary tract symptoms  Attempted removal of chavarria catheter 3/31. Resulted in urinary retention of 300ml and inability to urinate. New chavarria catheter instilled.    Hyperkalemia  Monitoring daily    Hyponatremia  Improved. Monitor regular labs.      Acute renal failure  Monitor regular labs and urine output      Wenckebach's phenomenon, heart block  Stable      Hypertension  Continue home meds. Monitor blood pressure and adjust meds PRN.        VTE Risk Mitigation (From admission, onward)         Ordered     enoxaparin injection 30 mg  Daily         03/28/22 1444     IP VTE HIGH RISK PATIENT  Once         03/28/22 1432     Place sequential compression device  Until discontinued         03/28/22 1432                Discharge Planning   MARCOS:      Code Status: Full Code   Is the patient medically ready for discharge?:     Reason for patient still in hospital (select all that apply): Treatment  Discharge Plan A: Home Health                  Jose Norton Jr, MD  Department of Hospital Medicine   [facility] ([facility])

## 2022-04-03 LAB
ALBUMIN SERPL BCP-MCNC: 2.3 G/DL (ref 3.5–5.2)
ALP SERPL-CCNC: 112 U/L (ref 55–135)
ALT SERPL W/O P-5'-P-CCNC: 45 U/L (ref 10–44)
ANION GAP SERPL CALC-SCNC: 5 MMOL/L (ref 8–16)
AST SERPL-CCNC: 31 U/L (ref 10–40)
BASOPHILS # BLD AUTO: 0.09 K/UL (ref 0–0.2)
BASOPHILS NFR BLD: 1 % (ref 0–1.9)
BILIRUB SERPL-MCNC: 0.3 MG/DL (ref 0.1–1)
BUN SERPL-MCNC: 60 MG/DL (ref 10–30)
CALCIUM SERPL-MCNC: 8.7 MG/DL (ref 8.7–10.5)
CHLORIDE SERPL-SCNC: 105 MMOL/L (ref 95–110)
CO2 SERPL-SCNC: 25 MMOL/L (ref 23–29)
CREAT SERPL-MCNC: 1.8 MG/DL (ref 0.5–1.4)
DIFFERENTIAL METHOD: ABNORMAL
EOSINOPHIL # BLD AUTO: 0.5 K/UL (ref 0–0.5)
EOSINOPHIL NFR BLD: 5.3 % (ref 0–8)
ERYTHROCYTE [DISTWIDTH] IN BLOOD BY AUTOMATED COUNT: 15.8 % (ref 11.5–14.5)
EST. GFR  (AFRICAN AMERICAN): 36.9 ML/MIN/1.73 M^2
EST. GFR  (NON AFRICAN AMERICAN): 32 ML/MIN/1.73 M^2
GLUCOSE SERPL-MCNC: 96 MG/DL (ref 70–110)
HCT VFR BLD AUTO: 24.4 % (ref 40–54)
HGB BLD-MCNC: 8.1 G/DL (ref 14–18)
IMM GRANULOCYTES # BLD AUTO: 0.03 K/UL (ref 0–0.04)
IMM GRANULOCYTES NFR BLD AUTO: 0.3 % (ref 0–0.5)
LYMPHOCYTES # BLD AUTO: 2.6 K/UL (ref 1–4.8)
LYMPHOCYTES NFR BLD: 27.9 % (ref 18–48)
MAGNESIUM SERPL-MCNC: 2 MG/DL (ref 1.6–2.6)
MCH RBC QN AUTO: 30.5 PG (ref 27–31)
MCHC RBC AUTO-ENTMCNC: 33.2 G/DL (ref 32–36)
MCV RBC AUTO: 92 FL (ref 82–98)
MONOCYTES # BLD AUTO: 1.3 K/UL (ref 0.3–1)
MONOCYTES NFR BLD: 13.9 % (ref 4–15)
NEUTROPHILS # BLD AUTO: 4.8 K/UL (ref 1.8–7.7)
NEUTROPHILS NFR BLD: 51.6 % (ref 38–73)
NRBC BLD-RTO: 0 /100 WBC
PLATELET # BLD AUTO: 434 K/UL (ref 150–450)
PMV BLD AUTO: 9.7 FL (ref 9.2–12.9)
POTASSIUM SERPL-SCNC: 4.9 MMOL/L (ref 3.5–5.1)
PROT SERPL-MCNC: 5.3 G/DL (ref 6–8.4)
RBC # BLD AUTO: 2.66 M/UL (ref 4.6–6.2)
SODIUM SERPL-SCNC: 135 MMOL/L (ref 136–145)
WBC # BLD AUTO: 9.25 K/UL (ref 3.9–12.7)

## 2022-04-03 PROCEDURE — 80053 COMPREHEN METABOLIC PANEL: CPT | Performed by: NURSE PRACTITIONER

## 2022-04-03 PROCEDURE — 25000003 PHARM REV CODE 250: Performed by: INTERNAL MEDICINE

## 2022-04-03 PROCEDURE — 83735 ASSAY OF MAGNESIUM: CPT | Performed by: NURSE PRACTITIONER

## 2022-04-03 PROCEDURE — 63600175 PHARM REV CODE 636 W HCPCS: Performed by: INTERNAL MEDICINE

## 2022-04-03 PROCEDURE — 11000001 HC ACUTE MED/SURG PRIVATE ROOM

## 2022-04-03 PROCEDURE — 85025 COMPLETE CBC W/AUTO DIFF WBC: CPT | Performed by: NURSE PRACTITIONER

## 2022-04-03 PROCEDURE — 36415 COLL VENOUS BLD VENIPUNCTURE: CPT | Performed by: NURSE PRACTITIONER

## 2022-04-03 RX ADMIN — TAMSULOSIN HYDROCHLORIDE 0.4 MG: 0.4 CAPSULE ORAL at 09:04

## 2022-04-03 RX ADMIN — Medication 1000 UNITS: at 09:04

## 2022-04-03 RX ADMIN — FENOFIBRATE 145 MG: 145 TABLET, FILM COATED ORAL at 08:04

## 2022-04-03 RX ADMIN — AMLODIPINE BESYLATE 5 MG: 5 TABLET ORAL at 08:04

## 2022-04-03 RX ADMIN — MIRTAZAPINE 7.5 MG: 7.5 TABLET ORAL at 08:04

## 2022-04-03 RX ADMIN — ASPIRIN 81 MG: 81 TABLET, COATED ORAL at 09:04

## 2022-04-03 RX ADMIN — TAMSULOSIN HYDROCHLORIDE 0.4 MG: 0.4 CAPSULE ORAL at 08:04

## 2022-04-03 RX ADMIN — ENOXAPARIN SODIUM 30 MG: 30 INJECTION SUBCUTANEOUS at 05:04

## 2022-04-03 RX ADMIN — THERA TABS 1 TABLET: TAB at 09:04

## 2022-04-04 PROCEDURE — 97110 THERAPEUTIC EXERCISES: CPT

## 2022-04-04 PROCEDURE — 97530 THERAPEUTIC ACTIVITIES: CPT

## 2022-04-04 PROCEDURE — 97116 GAIT TRAINING THERAPY: CPT

## 2022-04-04 PROCEDURE — 11000001 HC ACUTE MED/SURG PRIVATE ROOM

## 2022-04-04 PROCEDURE — 63600175 PHARM REV CODE 636 W HCPCS: Performed by: INTERNAL MEDICINE

## 2022-04-04 PROCEDURE — 97535 SELF CARE MNGMENT TRAINING: CPT

## 2022-04-04 PROCEDURE — 25000003 PHARM REV CODE 250: Performed by: INTERNAL MEDICINE

## 2022-04-04 RX ADMIN — TAMSULOSIN HYDROCHLORIDE 0.4 MG: 0.4 CAPSULE ORAL at 08:04

## 2022-04-04 RX ADMIN — THERA TABS 1 TABLET: TAB at 08:04

## 2022-04-04 RX ADMIN — AMLODIPINE BESYLATE 5 MG: 5 TABLET ORAL at 08:04

## 2022-04-04 RX ADMIN — ENOXAPARIN SODIUM 30 MG: 30 INJECTION SUBCUTANEOUS at 04:04

## 2022-04-04 RX ADMIN — HYDROCODONE BITARTRATE AND ACETAMINOPHEN 1 TABLET: 5; 325 TABLET ORAL at 08:04

## 2022-04-04 RX ADMIN — FENOFIBRATE 145 MG: 145 TABLET, FILM COATED ORAL at 08:04

## 2022-04-04 RX ADMIN — Medication 1000 UNITS: at 08:04

## 2022-04-04 RX ADMIN — MIRTAZAPINE 7.5 MG: 7.5 TABLET ORAL at 08:04

## 2022-04-04 RX ADMIN — ASPIRIN 81 MG: 81 TABLET, COATED ORAL at 08:04

## 2022-04-04 RX ADMIN — HYDROCODONE BITARTRATE AND ACETAMINOPHEN 1 TABLET: 5; 325 TABLET ORAL at 02:04

## 2022-04-04 RX ADMIN — POLYETHYLENE GLYCOL (3350) 17 G: 17 POWDER, FOR SOLUTION ORAL at 08:04

## 2022-04-04 NOTE — SUBJECTIVE & OBJECTIVE
Interval History: Pt seen and evaluated    Review of Systems   Constitutional:  Positive for activity change, appetite change and fatigue. Negative for chills and fever.   HENT:  Negative for congestion, drooling, ear pain, mouth sores, nosebleeds, sinus pressure, sinus pain and sore throat.    Eyes:  Negative for discharge, itching and visual disturbance.   Respiratory:  Negative for apnea, cough, chest tightness and wheezing.    Cardiovascular:  Negative for chest pain and palpitations.   Gastrointestinal:  Negative for abdominal distention, abdominal pain, blood in stool, diarrhea, nausea and vomiting.   Endocrine: Positive for cold intolerance. Negative for heat intolerance and polyphagia.   Genitourinary:  Positive for difficulty urinating. Negative for dysuria, flank pain and frequency.   Musculoskeletal:  Positive for arthralgias and back pain. Negative for myalgias.   Skin:  Negative for rash.   Neurological:  Positive for tremors and weakness. Negative for dizziness, seizures, syncope, facial asymmetry, speech difficulty and headaches.   Hematological:  Negative for adenopathy.   Psychiatric/Behavioral:  Negative for agitation, confusion and hallucinations. The patient is not nervous/anxious.    Objective:     Vital Signs (Most Recent):  Temp: 98.6 °F (37 °C) (04/04/22 0835)  Pulse: 108 (04/04/22 0835)  Resp: (!) 22 (04/04/22 0837)  BP: (!) 152/70 (04/04/22 0835)  SpO2: 97 % (04/04/22 0835)   Vital Signs (24h Range):  Temp:  [97.9 °F (36.6 °C)-98.6 °F (37 °C)] 98.6 °F (37 °C)  Pulse:  [] 108  Resp:  [18-22] 22  SpO2:  [97 %-100 %] 97 %  BP: (149-180)/(70-76) 152/70     Weight: 80.7 kg (178 lb)  Body mass index is 28.73 kg/m².    Intake/Output Summary (Last 24 hours) at 4/4/2022 1002  Last data filed at 4/4/2022 0934  Gross per 24 hour   Intake 1800 ml   Output 2400 ml   Net -600 ml        Physical Exam  Constitutional:       General: He is awake. He is not in acute distress.     Appearance: Normal  appearance. He is ill-appearing. He is not toxic-appearing or diaphoretic.   HENT:      Head: Normocephalic and atraumatic.      Nose: Nose normal. No congestion or rhinorrhea.      Mouth/Throat:      Mouth: Mucous membranes are moist.      Pharynx: Oropharynx is clear. No oropharyngeal exudate or posterior oropharyngeal erythema.   Eyes:      General: No scleral icterus.        Right eye: No discharge.         Left eye: No discharge.      Extraocular Movements: Extraocular movements intact.      Pupils: Pupils are equal, round, and reactive to light.   Neck:      Thyroid: No thyroid mass or thyromegaly.      Vascular: No carotid bruit.      Meningeal: Brudzinski's sign and Kernig's sign absent.   Cardiovascular:      Rate and Rhythm: Normal rate and regular rhythm.      Chest Wall: PMI is not displaced. No thrill.      Pulses: Normal pulses.      Heart sounds: Normal heart sounds. No murmur heard.    No friction rub. No gallop.   Pulmonary:      Effort: Pulmonary effort is normal. No tachypnea, accessory muscle usage, prolonged expiration or respiratory distress.      Breath sounds: Normal breath sounds. No stridor or decreased air movement. No wheezing, rhonchi or rales.   Chest:      Chest wall: No tenderness.   Abdominal:      General: Bowel sounds are normal. There is no distension.      Palpations: Abdomen is soft. There is no hepatomegaly, splenomegaly or mass.      Tenderness: There is no abdominal tenderness. There is no right CVA tenderness, left CVA tenderness, guarding or rebound.      Hernia: No hernia is present.   Musculoskeletal:         General: No swelling, tenderness, deformity or signs of injury.      Cervical back: Normal range of motion and neck supple. No rigidity. No muscular tenderness.   Lymphadenopathy:      Cervical: No cervical adenopathy.   Skin:     General: Skin is warm.      Capillary Refill: Capillary refill takes less than 2 seconds.      Coloration: Skin is not cyanotic, jaundiced  or pale.      Findings: Bruising, erythema and lesion present. No petechiae or rash.   Neurological:      Mental Status: He is alert and oriented to person, place, and time.      Cranial Nerves: No cranial nerve deficit, dysarthria or facial asymmetry.      Motor: Weakness present. No tremor.      Gait: Gait abnormal.   Psychiatric:         Mood and Affect: Mood normal. Mood is not anxious or depressed. Affect is not flat.         Speech: Speech is not rapid and pressured or slurred.         Behavior: Behavior normal. Behavior is not agitated, aggressive or combative.         Thought Content: Thought content normal. Thought content is not paranoid or delusional.         Cognition and Memory: Cognition is not impaired. Memory is not impaired.         Judgment: Judgment normal.       Significant Labs: All pertinent labs within the past 24 hours have been reviewed.  Recent Lab Results       None            Significant Imaging:   CT Forearm (Radius/Ulna) Without Contrast Right  Narrative: EXAMINATION:  CT FOREARM (RADIUS/ULNA) WITHOUT CONTRAST RIGHT    CLINICAL HISTORY:  Soft tissue mass, forearm, deep;    TECHNIQUE:  CT right forearm without IV contrast.  Multiplanar reformats.  Iterative reconstruction used for dose reduction.    CT past 12 months: 2    COMPARISON:  None    FINDINGS:  Lobulated high attenuation subcutaneous mass dorsal-ulnar aspect forearm soft tissues measuring approximately 7 x 6 x 1.5 cm.    Smaller more proximal similar appearing/density lesion subcutaneous tissues of  volar-ulnar aspect measuring approximately 2.5 x 1 cm.  More ill-defined diffuse subcutaneous edema within the forearm and long segment medial skin thickening.    No acute fracture, bone erosion or periosteal reaction.  Impression: 1. 7 x 6 x 1.5 cm high attenuation subcutaneous mass, likely a hematoma  2. Smaller similar appearing lesion more proximally, also likely subcutaneous hematoma  3. Diffuse subcutaneous fat stranding and  long segment medial skin tissue thickening, possible cellulitis or nonspecific edema  4. No acute osseous abnormalities    Electronically signed by: Simi Dorantes MD  Date:    03/25/2022  Time:    16:31  US Upper Extremity Veins Right  Narrative: EXAMINATION:  US UPPER EXTREMITY VEINS RIGHT    CLINICAL HISTORY:  post op swelling;    TECHNIQUE:  Grayscale, color and spectral analysis performed    FINDINGS:  PICC line visualized.  Visualized right internal jugular MC clavi in veins are patent.  Axillary and brachial veins are also patent without thrombus visualized.  Impression: Patent deep venous system without evidence of thrombus.  PICC line visualized    Electronically signed by: Simi Dorantes MD  Date:    03/25/2022  Time:    15:46

## 2022-04-04 NOTE — PT/OT/SLP PROGRESS
Occupational Therapy  Treatment    Maurice Roy   MRN: [id number]   Admitting Diagnosis: Spinal stenosis of lumbar region with neurogenic claudication    OT Date of Treatment: 04/04/22   AM Start Time: na  AM End Time: na  PM Start Time: 1400  PM End Time: 1530  Treatment Type: Individual 90  Total Time (min): 90 min      Billable Minutes:  Self Care/Home Management 15, Therapeutic Activity 30 and Therapeutic Exercise 45  Total Minutes: 90    General Precautions: Standard,    Orthopedic Precautions: spinal precautions  Braces:      Spiritual, Cultural Beliefs, Congregational Practices, Values that Affect Care: no    Subjective:  Communicated with nurse prior to session.    Pain/Comfort  Pain Rating 1: 3/10  Location 1: head  Pain Addressed 1: Nurse notified, Pre-medicate for activity  Pain Rating Post-Intervention 1: 3/10    Objective:  Pt was cooperative and motivated without verbal encouragement while exhibiting positive affect. He participated in functional transfer retraining throughout session to / from wheelchair, toilet, and chair emphasizing fall prevention providing extra time with repetition requiring min assist secondary to steadying assist with mod verbal and tactile cueing for safety awareness and technique utilizing RW. Pt then participated in ADL retraining regarding LB dressing emphasizing use of adaptive equipment including sock aide and reacher providing extra time with repetition requiring min assist secondary to steadying assist when threading bilateral LE's into shorts and pulling / adjusting shorts to waist with mod verbal and tactile cueing for safety and optimal technique. Next, he participated in therapeutic exercise performing 5x15 seated pushups requiring decreasing lifting assist, and verbal and tactile cueing for technique challenging him to lift buttocks off seated surface to end range elbow extension in order to strengthen triceps brachii to improve performance with sit <> stand  transitions. Pt then participated in therapeutic exercise performing 3x15 bilateral UE proximal strengthening exercises with scapular retraction, shoulder extension, shoulder adduction, horizontal abduction, shoulder flexion in plane of scaption, internal rotation, and external rotation while seated in chair unsupported requiring continuous verbal and tactile cueing for technique while sustaining Spinal Precautions.      Functional Mobility:  Transfer Training:   Sit to stand:Minimal Assistance with Rolling Walker .  Bed <> Chair:  Step Transfer with Minimal Assistance with Rolling Walker .    LE Dressing:  Patient don/doffed socks with Stand-by Assistance, Patient performed don/doffed adult brief with Minimal Assistance and Patient performed don/doffed pants with Minimal Assistance    Balance:   Static Sit: GOOD: Takes MODERATE challenges from all directions  Dynamic Sit:  FAIR+: Maintains balance through MINIMAL excursions of active trunk motion  Static Stand: FAIR: Maintains without assist but unable to take challenges  Dynamic stand: FAIR: Needs CONTACT GUARD during gait      Patient left supine with call button in reach and nurse notified    ASSESSMENT:  Pt demonstrated improved functional performance with LB dressing as noted by min assist in which patient able to cookie bilateral socks with use of sock aide and pull / adjust shorts to waist with steadying assist and mod cueing.    Rehab potential is good    Activity tolerance: Good    Discharge recommendations: other (see comments) (To be further determined based on progress prior to discharge.)     Equipment recommendations: other (see comments) (To be further determined based on progress prior to discharge.)     GOALS:   Multidisciplinary Problems     Occupational Therapy Goals        Problem: Occupational Therapy    Goal Priority Disciplines Outcome Interventions   Occupational Therapy Goal     OT, PT/OT     Description: Long Term Goals to be met by:  04/11/22     Patient will increase functional independence with ADLs by performing:    Feeding with [place].  UE Dressing with Modified [place].  LE Dressing with Modified [place].  Grooming while seated at sink with Modified [place].  Toileting from toilet with Modified [place] for hygiene and clothing management.   Bathing from  shower chair/bench with Modified [facility].  Step transfer with Modified [place]  Toilet transfer to toilet with Modified [facility] [facility].  Increased functional strength to 4+/5 for bilateral UE's.                     Plan:  Patient to be seen 5 x/week (for 90 min per day for 14 days) to address the above listed problems via self-care/home management, community/work re-entry, therapeutic activities, therapeutic exercises  Plan of Care expires: 04/11/22  Plan of Care reviewed with: patient         04/04/2022

## 2022-04-04 NOTE — PT/OT/SLP PROGRESS
Physical Therapy    Weekly  Treatment Note        Maurice Roy   MRN: [id number]     Pt received on 04-01-22  Total Time (min): 90        Billable Minutes:    Total Minutes: 90  AM Start Time:   AM End Time:   PM Start Time: 1445  PM End Time: 1615  Treatment Type: Individual 90  Treatment Type: Treatment  PT/PTA: PT             General Precautions: Standard,    Orthopedic Precautions:     Braces:           Subjective:  Communicated with nurse prior to session.         Objective:  Patient found in bed, with      Functional Mobility:  Bed Mobility:   Supine to sit: Minimal Assistance   Sit to supine: Minimal Assistance   Rolling: Standby Assistance   Scooting: Minimal Assistance    Balance:   Static Sit: FAIR+: Able to take MINIMAL challenges from all directions  Dynamic Sit:  FAIR: Cannot move trunk without losing balance  Static Stand: POOR+: Needs MINIMAL assist to maintain  Dynamic stand: FAIR: Needs CONTACT GUARD during gait    Transfer Training:  Sit to stand:Minimal Assistance with Rolling Walker    Bed <> Chair:  Step Transfer with Minimal Assistance with Rolling Walker      Wheelchair Training:  wc mobility 150ft with sba    Gait Training:  amb 150ft with rw     Stair Training:  None attempted      Additional Treatment:   sit to stand repeatedly 3 sets 10 reps    Activity Tolerance:  Patient tolerated treatment well    Patient left supine with call button in reach.    Assessment:  Maurice Roy is a 89 y.o. male with a medical diagnosis of Spinal stenosis of lumbar region with neurogenic claudication. He presents with better transfers and better going up and down steps.    Rehab potential is good.    Activity tolerance: Good    Discharge recommendations: Discharge Facility/Level of Care Needs: other (see comments) (to be determined later)     Equipment recommendations:       GOALS:   STGs  1. Pt will be sba with bed mobility- ongoing  2. Pt will be sba with transfers with rw- ongoing  3 pt will be  sba with rw 100ft-met  4. Pt sba with wc mobility- met  5. Pt will go up and down 4 steps with-met yanet   LTGs  1. Pt will be ind with bed mobility- ongoing  2. Pt will be ind with transfers- ongoing  3. Pt will amb supervision for 150 ft - ongoing  4. Pt will be ind with wc mobility- ongoing  5. Pt will go up and down 12 steps with sba- ongoing    PLAN:    Patient to be seen 5 x/week  to address the above listed problems via gait training, therapeutic activities, wheelchair management/training, therapeutic exercises, therapeutic groups, neuromuscular re-education  Plan of Care expires: 04/11/22  Plan of Care reviewed with: patient         4/4/2022

## 2022-04-04 NOTE — PROGRESS NOTES
[facility] Medicine  Progress Note    Patient Name: Maurice Roy  MRN: [id number]  Patient Class: IP- Rehab   Admission Date: 3/28/2022  Length of Stay: 7 days  Attending Physician: Freeman Kathleen III, MD  Primary Care Provider: Marleni Castillo MD        Subjective:     Principal Problem:Spinal stenosis of lumbar region with neurogenic claudication        HPI:  Patient is a 89-year-old male with a history of generalized arthritis prostate cancer hypertension and lower back pain.  The patient has a history of spinal stenosis and chronic lower back related issues.  At an outside facility the patient underwent a L4-L5 laminectomy and a foraminotomy at L4-L5 and S5-L5 S1.  Postoperatively the patient had pain in the back and tenderness at the surgical site.  His lower extremity weakness and neuropathic type symptoms improved after surgery.  Patient was seen by primary care after surgery and he was placed on antibiotics.  He was also started on antiplatelets and anti-platelet agents postoperatively.  Patient did have an episode of urinary tension postoperatively requiring a Nixon catheter placement urologic consultation and initiation of BPH medications.  Postop the patient also had worsening tremors was seen by Neurology had an abnormality on EKG and was seen by Cardiology and had several medication changes.  Patient also has had several electrolyte abnormalities requiring treatment.  Slowly the patient began to stabilize and he began therapy and he was referred to our inpatient rehab unit for close monitoring routine blood work close physician oversight and rehabilitative care.  I evaluated the patient this morning on the exercise bike he is very eager and motivated to complete therapy and return home.  The patient was still functioning at a modified independent functional state and is hoping to get back to goal of return home.  The patient has multiple skin tear is requiring wound  treatment as well as a hematoma to the right forearm.  Patient is also having dyspnea with speaking and moderate exertion.  Patient's chart has been reviewed feel is an excellent candidate for our unit.        Overview/Hospital Course:  3/30 SD: Pt laying in bed. Localized hematoma  to right forearm - treating with pressure dressing. Denies any complaints. Continue IPR efforts.    3/31 SD: Pt dressing, sitting in wheelchair in room. Wearing back brace. Positive attitude. Bladder training complete. Remove chavarria catheter today.    4/1 SD: Pt sitting on side of bed getting dressed with assistance, then transitions to wheelchair for therapy. Pt had urinary retention of 300ml and inability to urinate after removal of chavarria catheter. A new chavarria catheter was instilled.    4/2 WC:  stable without acute issues.    4/4/22 LFC no acute issues      Interval History: Pt seen and evaluated    Review of Systems   Constitutional:  Positive for activity change, appetite change and fatigue. Negative for chills and fever.   HENT:  Negative for congestion, drooling, ear pain, mouth sores, nosebleeds, sinus pressure, sinus pain and sore throat.    Eyes:  Negative for discharge, itching and visual disturbance.   Respiratory:  Negative for apnea, cough, chest tightness and wheezing.    Cardiovascular:  Negative for chest pain and palpitations.   Gastrointestinal:  Negative for abdominal distention, abdominal pain, blood in stool, diarrhea, nausea and vomiting.   Endocrine: Positive for cold intolerance. Negative for heat intolerance and polyphagia.   Genitourinary:  Positive for difficulty urinating. Negative for dysuria, flank pain and frequency.   Musculoskeletal:  Positive for arthralgias and back pain. Negative for myalgias.   Skin:  Negative for rash.   Neurological:  Positive for tremors and weakness. Negative for dizziness, seizures, syncope, facial asymmetry, speech difficulty and headaches.   Hematological:  Negative for  adenopathy.   Psychiatric/Behavioral:  Negative for agitation, confusion and hallucinations. The patient is not nervous/anxious.    Objective:     Vital Signs (Most Recent):  Temp: 98.6 °F (37 °C) (04/04/22 0835)  Pulse: 108 (04/04/22 0835)  Resp: (!) 22 (04/04/22 0837)  BP: (!) 152/70 (04/04/22 0835)  SpO2: 97 % (04/04/22 0835)   Vital Signs (24h Range):  Temp:  [97.9 °F (36.6 °C)-98.6 °F (37 °C)] 98.6 °F (37 °C)  Pulse:  [] 108  Resp:  [18-22] 22  SpO2:  [97 %-100 %] 97 %  BP: (149-180)/(70-76) 152/70     Weight: 80.7 kg (178 lb)  Body mass index is 28.73 kg/m².    Intake/Output Summary (Last 24 hours) at 4/4/2022 1002  Last data filed at 4/4/2022 0934  Gross per 24 hour   Intake 1800 ml   Output 2400 ml   Net -600 ml        Physical Exam  Constitutional:       General: He is awake. He is not in acute distress.     Appearance: Normal appearance. He is ill-appearing. He is not toxic-appearing or diaphoretic.   HENT:      Head: Normocephalic and atraumatic.      Nose: Nose normal. No congestion or rhinorrhea.      Mouth/Throat:      Mouth: Mucous membranes are moist.      Pharynx: Oropharynx is clear. No oropharyngeal exudate or posterior oropharyngeal erythema.   Eyes:      General: No scleral icterus.        Right eye: No discharge.         Left eye: No discharge.      Extraocular Movements: Extraocular movements intact.      Pupils: Pupils are equal, round, and reactive to light.   Neck:      Thyroid: No thyroid mass or thyromegaly.      Vascular: No carotid bruit.      Meningeal: Brudzinski's sign and Kernig's sign absent.   Cardiovascular:      Rate and Rhythm: Normal rate and regular rhythm.      Chest Wall: PMI is not displaced. No thrill.      Pulses: Normal pulses.      Heart sounds: Normal heart sounds. No murmur heard.    No friction rub. No gallop.   Pulmonary:      Effort: Pulmonary effort is normal. No tachypnea, accessory muscle usage, prolonged expiration or respiratory distress.      Breath  sounds: Normal breath sounds. No stridor or decreased air movement. No wheezing, rhonchi or rales.   Chest:      Chest wall: No tenderness.   Abdominal:      General: Bowel sounds are normal. There is no distension.      Palpations: Abdomen is soft. There is no hepatomegaly, splenomegaly or mass.      Tenderness: There is no abdominal tenderness. There is no right CVA tenderness, left CVA tenderness, guarding or rebound.      Hernia: No hernia is present.   Musculoskeletal:         General: No swelling, tenderness, deformity or signs of injury.      Cervical back: Normal range of motion and neck supple. No rigidity. No muscular tenderness.   Lymphadenopathy:      Cervical: No cervical adenopathy.   Skin:     General: Skin is warm.      Capillary Refill: Capillary refill takes less than 2 seconds.      Coloration: Skin is not cyanotic, jaundiced or pale.      Findings: Bruising, erythema and lesion present. No petechiae or rash.   Neurological:      Mental Status: He is alert and oriented to person, place, and time.      Cranial Nerves: No cranial nerve deficit, dysarthria or facial asymmetry.      Motor: Weakness present. No tremor.      Gait: Gait abnormal.   Psychiatric:         Mood and Affect: Mood normal. Mood is not anxious or depressed. Affect is not flat.         Speech: Speech is not rapid and pressured or slurred.         Behavior: Behavior normal. Behavior is not agitated, aggressive or combative.         Thought Content: Thought content normal. Thought content is not paranoid or delusional.         Cognition and Memory: Cognition is not impaired. Memory is not impaired.         Judgment: Judgment normal.       Significant Labs: All pertinent labs within the past 24 hours have been reviewed.  Recent Lab Results       None            Significant Imaging:   CT Forearm (Radius/Ulna) Without Contrast Right  Narrative: EXAMINATION:  CT FOREARM (RADIUS/ULNA) WITHOUT CONTRAST RIGHT    CLINICAL HISTORY:  Soft  tissue mass, forearm, deep;    TECHNIQUE:  CT right forearm without IV contrast.  Multiplanar reformats.  Iterative reconstruction used for dose reduction.    CT past 12 months: 2    COMPARISON:  None    FINDINGS:  Lobulated high attenuation subcutaneous mass dorsal-ulnar aspect forearm soft tissues measuring approximately 7 x 6 x 1.5 cm.    Smaller more proximal similar appearing/density lesion subcutaneous tissues of  volar-ulnar aspect measuring approximately 2.5 x 1 cm.  More ill-defined diffuse subcutaneous edema within the forearm and long segment medial skin thickening.    No acute fracture, bone erosion or periosteal reaction.  Impression: 1. 7 x 6 x 1.5 cm high attenuation subcutaneous mass, likely a hematoma  2. Smaller similar appearing lesion more proximally, also likely subcutaneous hematoma  3. Diffuse subcutaneous fat stranding and long segment medial skin tissue thickening, possible cellulitis or nonspecific edema  4. No acute osseous abnormalities    Electronically signed by: Simi Dorantes MD  Date:    03/25/2022  Time:    16:31  US Upper Extremity Veins Right  Narrative: EXAMINATION:  US UPPER EXTREMITY VEINS RIGHT    CLINICAL HISTORY:  post op swelling;    TECHNIQUE:  Grayscale, color and spectral analysis performed    FINDINGS:  PICC line visualized.  Visualized right internal jugular MC clavi in veins are patent.  Axillary and brachial veins are also patent without thrombus visualized.  Impression: Patent deep venous system without evidence of thrombus.  PICC line visualized    Electronically signed by: Simi Dorantes MD  Date:    03/25/2022  Time:    15:46       Assessment/Plan:      * Spinal stenosis of lumbar region with neurogenic claudication  Continue inpatient rehab efforts      Hematoma  Right forearm  Improved      BPH with obstruction/lower urinary tract symptoms  Attempted removal of chavarria catheter 3/31. Resulted in urinary retention of 300ml and inability to urinate. New chavarria  catheter instilled.    Hyperkalemia  Monitoring daily    Hyponatremia  Improved. Monitor regular labs.      Acute renal failure  Monitor regular labs and urine output      Wenckebach's phenomenon, heart block  Stable      Hypertension  Continue home meds. Monitor blood pressure and adjust meds PRN.        VTE Risk Mitigation (From admission, onward)         Ordered     enoxaparin injection 30 mg  Daily         03/28/22 1444     IP VTE HIGH RISK PATIENT  Once         03/28/22 1432     Place sequential compression device  Until discontinued         03/28/22 1432                Discharge Planning   MARCOS:      Code Status: Full Code   Is the patient medically ready for discharge?:     Reason for patient still in hospital (select all that apply): Patient trending condition, Laboratory test, Treatment and PT / OT recommendations  Discharge Plan A: Home Health                  Brittany Boateng NP  Department of Hospital Medicine   [facility] ([facility])

## 2022-04-04 NOTE — NURSING
I spoke Ameka with [facility] who stated PERLA Mi said to keep staples in until follow up. Nasra with case management was notified [facility] said to make a follow up appointment for him once we have a dc date.

## 2022-04-04 NOTE — PLAN OF CARE
Problem: Oral Intake Inadequate (Acute Kidney Injury/Impairment)  Goal: Optimal Nutrition Intake  Outcome: Ongoing, Progressing     Problem: Skin Injury Risk Increased  Goal: Skin Health and Integrity  Outcome: Ongoing, Progressing     Problem: Fall Injury Risk  Goal: Absence of Fall and Fall-Related Injury  Outcome: Ongoing, Progressing

## 2022-04-04 NOTE — PT/OT/SLP PROGRESS
Physical Therapy         Treatment        Maurice Roy   MRN: [id number]     PT Received On: 04/04/22  Total Time (min): 90        Billable Minutes:  Gait [id number], Therapeutic Activity 30 and Therapeutic Exercise 30  Total Minutes: 90  AM Start Time:1045  AM End Time: 1215  PM Start Time:   PM End Time:   Treatment Type: Individual 90  Treatment Type: Treatment  PT/PTA: PT             General Precautions: Standard,    Orthopedic Precautions:     Braces:           Subjective:  Communicated with nurse prior to session.         Objective:  Patient found in bed, with      Functional Mobility:  Bed Mobility:   Supine to sit: Standby Assistance   Sit to supine: Standby Assistance   Rolling: Standby Assistance   Scooting: Standby Assistance    Balance:   Static Sit: FAIR+: Able to take MINIMAL challenges from all directions  Dynamic Sit:  FAIR+: Maintains balance through MINIMAL excursions of active trunk motion  Static Stand: FAIR: Maintains without assist but unable to take challenges  Dynamic stand: FAIR+: Needs CLOSE SUPERVISION during gait and is able to right self with minor LOB    Transfer Training:  Sit to stand:Contact Guard Assistance with Rolling Walker    Bed <> Chair:  Step Transfer with Contact Guard Assistance with Rolling Walker      Wheelchair Training:  sba with wc mobility 150ft    Gait Training:  amb sba for 100, 150,150ft respectively with a rw     Stair Training:  Went up and down 4 steps with yanet      Additional Treatment:  Performed BLe exs in sitting with 3lb ankle wts and repeated sit to stands with cga    Activity Tolerance:  Patient tolerated treatment well    Patient left supine with call button in reach.    Assessment:  Maurice Roy is a 89 y.o. male with a medical diagnosis of Spinal stenosis of lumbar region with neurogenic claudication. He presents with better transfers.    Rehab potential is good.    Activity tolerance: Good    Discharge recommendations: Discharge  Facility/Level of Care Needs: other (see comments) (to be determined later)     Equipment recommendations:       GOALS:   Multidisciplinary Problems     Physical Therapy Goals     Not on file                PLAN:    Patient to be seen 5 x/week  to address the above listed problems via gait training, therapeutic activities, wheelchair management/training, therapeutic exercises, therapeutic groups, neuromuscular re-education  Plan of Care expires: 04/11/22  Plan of Care reviewed with: patient         4/4/2022

## 2022-04-05 PROCEDURE — 97110 THERAPEUTIC EXERCISES: CPT

## 2022-04-05 PROCEDURE — 97535 SELF CARE MNGMENT TRAINING: CPT

## 2022-04-05 PROCEDURE — 97112 NEUROMUSCULAR REEDUCATION: CPT | Mod: CQ

## 2022-04-05 PROCEDURE — 63600175 PHARM REV CODE 636 W HCPCS: Performed by: INTERNAL MEDICINE

## 2022-04-05 PROCEDURE — 25000003 PHARM REV CODE 250: Performed by: INTERNAL MEDICINE

## 2022-04-05 PROCEDURE — 97530 THERAPEUTIC ACTIVITIES: CPT

## 2022-04-05 PROCEDURE — 11000001 HC ACUTE MED/SURG PRIVATE ROOM

## 2022-04-05 PROCEDURE — 97116 GAIT TRAINING THERAPY: CPT | Mod: CQ

## 2022-04-05 RX ADMIN — HYDROCODONE BITARTRATE AND ACETAMINOPHEN 1 TABLET: 5; 325 TABLET ORAL at 08:04

## 2022-04-05 RX ADMIN — POLYETHYLENE GLYCOL (3350) 17 G: 17 POWDER, FOR SOLUTION ORAL at 08:04

## 2022-04-05 RX ADMIN — ENOXAPARIN SODIUM 30 MG: 30 INJECTION SUBCUTANEOUS at 04:04

## 2022-04-05 RX ADMIN — TAMSULOSIN HYDROCHLORIDE 0.4 MG: 0.4 CAPSULE ORAL at 08:04

## 2022-04-05 RX ADMIN — THERA TABS 1 TABLET: TAB at 08:04

## 2022-04-05 RX ADMIN — ASPIRIN 81 MG: 81 TABLET, COATED ORAL at 08:04

## 2022-04-05 RX ADMIN — AMLODIPINE BESYLATE 5 MG: 5 TABLET ORAL at 08:04

## 2022-04-05 RX ADMIN — MIRTAZAPINE 7.5 MG: 7.5 TABLET ORAL at 08:04

## 2022-04-05 RX ADMIN — Medication 1000 UNITS: at 08:04

## 2022-04-05 RX ADMIN — HYDROCODONE BITARTRATE AND ACETAMINOPHEN 1 TABLET: 5; 325 TABLET ORAL at 09:04

## 2022-04-05 RX ADMIN — FENOFIBRATE 145 MG: 145 TABLET, FILM COATED ORAL at 08:04

## 2022-04-05 NOTE — PT/OT/SLP PROGRESS
Occupational Therapy  Treatment    Maurice Roy   MRN: [id number]   Admitting Diagnosis: Spinal stenosis of lumbar region with neurogenic claudication    OT Date of Treatment: 04/05/22   AM Start Time: 0900  AM End Time: 1030  PM Start Time: na  PM End Time: na  Treatment Type: Individual 60 and Concurrent 30\  Total Time (min): 90 min      Billable Minutes:  Self Care/Home Management 30, Therapeutic Activity 30 and Therapeutic Exercise 30  Total Minutes: 90    General Precautions: Standard,    Orthopedic Precautions: spinal precautions  Braces:      Spiritual, Cultural Beliefs, Latter day Practices, Values that Affect Care: no    Subjective:  Communicated with nurse prior to session.    Pain/Comfort  Pain Rating Post-Intervention 1: 5/10  Location - Side 2: Bilateral  Location - Orientation 2: lower  Location 2: back  Pain Addressed 2: Reposition, Cessation of Activity, Nurse notified  Pain Rating Post-Intervention 2: 5/10    Objective:  Patient found with: chavarria catheter. Pt was cooperative and motivated without verbal encouragement while exhibiting positive affect. He participated in ADL retraining regarding UB dressing emphasizing use of compensatory strategies in order to sustain Spinal Precautions providing extra time requiring SBA secondary to mod verbal cueing to cookie TLSO. Pt then participated in ADL retraining regarding LB dressing emphasizing use of adaptive equipment including sock aide and reacher providing extra time with repetition requiring mod assist secondary to steadying assist when threading bilateral LE's into shorts and pulling / adjusting shorts to waist, and assist to finishing donning bilateral shoes with mod verbal and tactile cueing for safety and optimal technique. Next, he participated in functional transfer retraining throughout session to / from wheelchair, toilet, and chair emphasizing fall prevention providing extra time with repetition requiring min assist secondary to  steadying assist with mod verbal and tactile cueing for safety awareness and technique utilizing RW. Pt then participated in therapeutic exercise performing 5x15 seated pushups requiring decreasing lifting assist, and verbal and tactile cueing for technique challenging him to lift buttocks off seated surface to end range elbow extension in order to strengthen triceps brachii to improve performance with sit <> stand transitions.      Functional Mobility:  Bed Mobility:   Supine to sit: Standby Assistance   Sit to supine: Standby Assistance   Rolling: Standby Assistance   Scooting: Standby Assistance    Transfer Training:   Sit to stand:Minimal Assistance with Rolling Walker .  Bed <> Chair:  Step Transfer with Contact Guard Assistance and Moderate Assistance with Rolling Walker .  Toilet Transfer:  Pt Step Transfer with Contact Guard Assistance with Grab bars .    UE Dressing:  Patient performed UE Dressing with Supervision or Set-up Assistance with extra time at Edge of bed.    LE Dressing:  Patient don/doffed socks with Stand-by Assistance, Patient don/doffed shoes with Moderate Assistance, Patient performed don/doffed adult brief with Minimal Assistance and Patient performed don/doffed pants with Minimal Assistance    Balance:   Static Sit: GOOD: Takes MODERATE challenges from all directions  Dynamic Sit:  FAIR+: Maintains balance through MINIMAL excursions of active trunk motion  Static Stand: FAIR: Maintains without assist but unable to take challenges  Dynamic stand: FAIR: Needs CONTACT GUARD during gait    Patient left up in chair with PT notified    ASSESSMENT:  Pt demonstrated improved functional performance with UB dressing as noted by SBA on this date as noted by safety concerns with Spinal Precautions requiring mod verbal and tactile cueing for technique in which patient able to pull / adjust shirt to waist on back and cookie TLSO with cueing.     Rehab potential is good    Activity tolerance:  Good    Discharge recommendations: other (see comments) (To be further determined based on progress prior to discharge.)     Equipment recommendations: other (see comments) (To be further determined based on progress prior to discharge.)     GOALS:   Multidisciplinary Problems     Occupational Therapy Goals        Problem: Occupational Therapy    Goal Priority Disciplines Outcome Interventions   Occupational Therapy Goal     OT, PT/OT     Description: Long Term Goals to be met by: 04/11/22     Patient will increase functional independence with ADLs by performing:    Feeding with [place].  UE Dressing with Modified [place].  LE Dressing with Modified [place].  Grooming while seated at sink with Modified [place].  Toileting from toilet with Modified [place] for hygiene and clothing management.   Bathing from  shower chair/bench with Modified [place].  Step transfer with Modified [place]  Toilet transfer to toilet with Modified [place].  Increased functional strength to 4+/5 for bilateral UE's.                     Plan:  Patient to be seen 5 x/week (for 90 min per day for 14 days) to address the above listed problems via self-care/home management, community/work re-entry, therapeutic activities, therapeutic exercises  Plan of Care expires: 04/11/22  Plan of Care reviewed with: patient         04/05/2022

## 2022-04-05 NOTE — PLAN OF CARE
CM contact pt's daughter Delmy to discuss today's team meeting. Informed Delmy that pt is scheduled to discharge on Tuesday of next week. Delmy noted that day does not work because she is going to be out of town and inquired if we could keep the patient longer. Informed Delmy that is patient's discharge date. Delmy inquired about pt's progress and CM informed Delmy pt is ambulating 150ft with a RW and SBA from therapist. Delmy would like a family training with therapist. Family training is scheduled for tomorrow at 10:30. Therapist notified.

## 2022-04-05 NOTE — PROGRESS NOTES
"[facility])  Adult Nutrition  Progress Note    SUMMARY       Recommendations    Recommendation/Intervention:   1. Continue current diet order- Regular. Honor food preferences     2. Oral nutrition supplementation- Ensure Enlive BID     3. RD to monitor and make recs accordingly.    Goals: 50-75% po intake through out admission  Nutrition Goal Status: goal not met    Assessment and Plan  Nutrition Problem  Inadequate protein-energy intake      Related to (etiology):   Decrease appetite, early satiety     Signs and Symptoms (as evidenced by):   Avg PO intake @ 25-50%      Interventions/Recommendations (treatment strategy):  1. Continue current diet order- Regular. Honor food preferences      2. Oral nutrition supplementation- Ensure Enlive BID      3. RD to monitor and make recs accordingly.     Nutrition Diagnosis Status:   Continues    Reason for Assessment    Reason For Assessment: RD follow-up  Diagnosis: other (see comments) (spinal stenosis)  Relevant Medical History: Ca, HTN  General Information Comments: PO @ 30%  avg + 100% ONS intake. Noted, 9lb decrease- likely fluid losses.  Nutrition Discharge Planning: Regular    Nutrition Risk Screen    Nutrition Risk Screen: other (see comments) (skin very fragile with skin tears everywhere)    Nutrition/Diet History    Patient Reported Diet/Restrictions/Preferences: general  Spiritual, Cultural Beliefs, Roman Catholic Practices, Values that Affect Care: no  Food Allergies: NKFA  Factors Affecting Nutritional Intake: early satiety    Anthropometrics    Temp: 97.2 °F (36.2 °C)  Height Method: Stated  Height: 5' 6" (167.6 cm)  Height (inches): 66 in  Weight Method: Standard Scale  Weight: 77 kg (169 lb 12.8 oz)  Weight (lb): 169.8 lb  Ideal Body Weight (IBW), Male: 142 lb  % Ideal Body Weight, Male (lb): 125.35 %  BMI (Calculated): 27.4  BMI Grade: 25 - 29.9 - overweight       Lab/Procedures/Meds    Pertinent Labs Reviewed: reviewed  Pertinent Medications " Reviewed: reviewed  Pertinent Medications Comments: Remeron, MVI    Physical Findings/Assessment         Estimated/Assessed Needs    Weight Used For Calorie Calculations: 71 kg (156 lb 8.4 oz)  Energy Calorie Requirements (kcal): 1775 kcal (25 kcal/kg AJ IBW)  Energy Need Method: Kcal/kg  Protein Requirements: 71-85 gm (1-1.2 gm/kg AJ IBW)  Weight Used For Protein Calculations: 71 kg (156 lb 8.4 oz)     Estimated Fluid Requirement Method: RDA Method  RDA Method (mL): 1775         Nutrition Prescription Ordered    Current Diet Order: Regular  Oral Nutrition Supplement: Ensure BID    Evaluation of Received Nutrient/Fluid Intake       % Intake of Estimated Energy Needs: 25 - 50 %  % Meal Intake: 25 - 50 %    Nutrition Risk    Level of Risk/Frequency of Follow-up: low - moderate     Monitor and Evaluation    Food and Nutrient Intake: food and beverage intake  Food and Nutrient Adminstration: diet order  Physical Activity and Function: nutrition-related ADLs and IADLs  Anthropometric Measurements: weight, weight change  Biochemical Data, Medical Tests and Procedures: lipid profile, gastrointestinal profile, electrolyte and renal panel, glucose/endocrine profile, inflammatory profile  Nutrition-Focused Physical Findings: overall appearance     Nutrition Follow-Up    RD Follow-up?: Yes

## 2022-04-05 NOTE — PT/OT/SLP PROGRESS
Physical Therapy Treatment    Patient Name:  Maurice Roy   MRN:  [id number]    Time Tracking:     PT Start Time:   1130     PT Stop Time:   1200   PT Total Time (min):  30 minutes individual     Billable Minutes: Gait Training 15 and Neuromuscular Re-education 15  Madiha Sommer, PTA  04/05/2022      Subjective     Patient/Family Comments/goals: Pt reports only usual discomfort w/ no new complaints.   Pain/Comfort: MARY LE's per pt.     Objective:     General Precautions: Standard,   [place]  Orthopedic Precautions:  L4-L5 laminectomy & foraminotomy at L4-L5 and S5-L5 S1    Communicated with PT prior to session.  Patient found up in chair w/ PT/OT present upon PTA entry to room.     Therapeutic Activities and Exercises:  Back brace on t/o session. Completed gt using RW w/ visual cueing d/t pt [place]. Cueing provided to increase proximity of RW & upright posture, as pt tends to ambulate w/ flexed trunk posture & RW out in front. He corrected when cued. completed 100',5', & 150'. MIN A for sit<>stand.     F/B standing static balance training 3 episodes x 30 sec each eyes closed, no hands w/ MOD A to maintain balance.     W/C propulsion using legs only for functional strengthening x 200'.     Patient left up in chair at kitchen table with Nrs staff available.    Assessment:     Maurice Roy is a 89 y.o. male admitted with a medical diagnosis of Spinal stenosis of lumbar region with neurogenic claudication. Pt became SOB w/ each walking episode. O2 sat checked and shown at 96%,  during exertion.      Plan:     During this hospitalization, patient to be seen 5 x/week to address the identified rehab impairments gait training, therapeutic activities, wheelchair management/training, therapeutic exercises, therapeutic groups, neuromuscular re-education and progress toward the goals.      Recommendations:     Cont POC.

## 2022-04-05 NOTE — PT/OT/SLP PROGRESS
Physical Therapy         Treatment        Maurice Roy   MRN: [id number]     PT Received On: 04/05/22  Total Time (min): 60        Billable Minutes:  Gait [id number], Therapeutic Activity 30 and Therapeutic Exercise 15  Total Minutes: 60  AM Start Time:   AM End Time:  PM Start Time: 1300  PM End Time: 1400  Treatment Type: Individual 60  Treatment Type: Treatment  PT/PTA: PT             General Precautions: Standard,    Orthopedic Precautions:     Braces:           Subjective:  Communicated with nurse  prior to session.         Objective:  Patient found in , with      Functional Mobility:  Bed Mobility:   Supine to sit: Standby Assistance   Sit to supine: Standby Assistance   Rolling: Standby Assistance   Scooting: Standby Assistance    Balance:   Static Sit: FAIR+: Able to take MINIMAL challenges from all directions  Dynamic Sit:  FAIR+: Maintains balance through MINIMAL excursions of active trunk motion  Static Stand: FAIR+: Takes MINIMAL challenges from all directions  Dynamic stand: FAIR+: Needs CLOSE SUPERVISION during gait and is able to right self with minor LOB    Transfer Training:  Sit to stand:Contact Guard Assistance with Rolling Walker    Bed <> Chair:  Step Transfer with Contact Guard Assistance with Rolling Walker      Wheelchair Training:  sba with  mobility 150ft    Gait Training:  Pt ambulates with a rw 150ft close sba    Stair Training:  Up and down 4 steps with yanet      Additional Treatment:  Focused a lot of session on proper technique with sit to stand and stand to sit with emphasis on leaning forward more, placing feet below the knees, scooting forward, and reaching back slowly upon decent to chair. Pt rode nu step level 3  resistance for 15 min.    Activity Tolerance:  Patient tolerated treatment well    Patient left supine with call button in reach.    Assessment:  Maurice Roy is a 89 y.o. male with a medical diagnosis of Spinal stenosis of lumbar region with neurogenic  claudication. He presents with still needing cga with transfers and close sba with gait.    Rehab potential is good.    Activity tolerance: Good    Discharge recommendations: Discharge Facility/Level of Care Needs: other (see comments) (to be determined later)     Equipment recommendations:       GOALS:   Multidisciplinary Problems     Physical Therapy Goals     Not on file                PLAN:    Patient to be seen 5 x/week  to address the above listed problems via gait training, therapeutic activities, wheelchair management/training, therapeutic exercises, therapeutic groups, neuromuscular re-education  Plan of Care expires: 04/11/22  Plan of Care reviewed with: patient         4/5/2022

## 2022-04-06 LAB
ALBUMIN SERPL BCP-MCNC: 2.5 G/DL (ref 3.5–5.2)
ALP SERPL-CCNC: 103 U/L (ref 55–135)
ALT SERPL W/O P-5'-P-CCNC: 32 U/L (ref 10–44)
ANION GAP SERPL CALC-SCNC: 6 MMOL/L (ref 8–16)
AST SERPL-CCNC: 25 U/L (ref 10–40)
BASOPHILS # BLD AUTO: 0.08 K/UL (ref 0–0.2)
BASOPHILS NFR BLD: 0.7 % (ref 0–1.9)
BILIRUB SERPL-MCNC: 0.3 MG/DL (ref 0.1–1)
BUN SERPL-MCNC: 59 MG/DL (ref 10–30)
CALCIUM SERPL-MCNC: 9.4 MG/DL (ref 8.7–10.5)
CHLORIDE SERPL-SCNC: 105 MMOL/L (ref 95–110)
CO2 SERPL-SCNC: 27 MMOL/L (ref 23–29)
CREAT SERPL-MCNC: 2 MG/DL (ref 0.5–1.4)
DIFFERENTIAL METHOD: ABNORMAL
EOSINOPHIL # BLD AUTO: 0.4 K/UL (ref 0–0.5)
EOSINOPHIL NFR BLD: 3.8 % (ref 0–8)
ERYTHROCYTE [DISTWIDTH] IN BLOOD BY AUTOMATED COUNT: 15.7 % (ref 11.5–14.5)
EST. GFR  (AFRICAN AMERICAN): 32.5 ML/MIN/1.73 M^2
EST. GFR  (NON AFRICAN AMERICAN): 28.1 ML/MIN/1.73 M^2
GLUCOSE SERPL-MCNC: 93 MG/DL (ref 70–110)
HCT VFR BLD AUTO: 26.2 % (ref 40–54)
HGB BLD-MCNC: 8.6 G/DL (ref 14–18)
IMM GRANULOCYTES # BLD AUTO: 0.03 K/UL (ref 0–0.04)
IMM GRANULOCYTES NFR BLD AUTO: 0.3 % (ref 0–0.5)
LYMPHOCYTES # BLD AUTO: 3.3 K/UL (ref 1–4.8)
LYMPHOCYTES NFR BLD: 30.1 % (ref 18–48)
MAGNESIUM SERPL-MCNC: 2.2 MG/DL (ref 1.6–2.6)
MCH RBC QN AUTO: 30.5 PG (ref 27–31)
MCHC RBC AUTO-ENTMCNC: 32.8 G/DL (ref 32–36)
MCV RBC AUTO: 93 FL (ref 82–98)
MONOCYTES # BLD AUTO: 1.6 K/UL (ref 0.3–1)
MONOCYTES NFR BLD: 14.4 % (ref 4–15)
NEUTROPHILS # BLD AUTO: 5.5 K/UL (ref 1.8–7.7)
NEUTROPHILS NFR BLD: 50.7 % (ref 38–73)
NRBC BLD-RTO: 0 /100 WBC
PLATELET # BLD AUTO: 455 K/UL (ref 150–450)
PMV BLD AUTO: 10.7 FL (ref 9.2–12.9)
POTASSIUM SERPL-SCNC: 5.3 MMOL/L (ref 3.5–5.1)
PROT SERPL-MCNC: 6.1 G/DL (ref 6–8.4)
RBC # BLD AUTO: 2.82 M/UL (ref 4.6–6.2)
SODIUM SERPL-SCNC: 138 MMOL/L (ref 136–145)
WBC # BLD AUTO: 10.88 K/UL (ref 3.9–12.7)

## 2022-04-06 PROCEDURE — 97110 THERAPEUTIC EXERCISES: CPT

## 2022-04-06 PROCEDURE — 97530 THERAPEUTIC ACTIVITIES: CPT

## 2022-04-06 PROCEDURE — 97535 SELF CARE MNGMENT TRAINING: CPT

## 2022-04-06 PROCEDURE — 36415 COLL VENOUS BLD VENIPUNCTURE: CPT | Performed by: NURSE PRACTITIONER

## 2022-04-06 PROCEDURE — 25000003 PHARM REV CODE 250: Performed by: INTERNAL MEDICINE

## 2022-04-06 PROCEDURE — 80053 COMPREHEN METABOLIC PANEL: CPT | Performed by: NURSE PRACTITIONER

## 2022-04-06 PROCEDURE — 85025 COMPLETE CBC W/AUTO DIFF WBC: CPT | Performed by: NURSE PRACTITIONER

## 2022-04-06 PROCEDURE — 83735 ASSAY OF MAGNESIUM: CPT | Performed by: NURSE PRACTITIONER

## 2022-04-06 PROCEDURE — 97116 GAIT TRAINING THERAPY: CPT

## 2022-04-06 PROCEDURE — 11000001 HC ACUTE MED/SURG PRIVATE ROOM

## 2022-04-06 PROCEDURE — 63600175 PHARM REV CODE 636 W HCPCS: Performed by: INTERNAL MEDICINE

## 2022-04-06 RX ORDER — LACTULOSE 10 G/15ML
20 SOLUTION ORAL 3 TIMES DAILY PRN
Status: DISCONTINUED | OUTPATIENT
Start: 2022-04-06 | End: 2022-04-12 | Stop reason: HOSPADM

## 2022-04-06 RX ADMIN — THERA TABS 1 TABLET: TAB at 08:04

## 2022-04-06 RX ADMIN — MIRTAZAPINE 7.5 MG: 7.5 TABLET ORAL at 08:04

## 2022-04-06 RX ADMIN — Medication 6 MG: at 09:04

## 2022-04-06 RX ADMIN — ASPIRIN 81 MG: 81 TABLET, COATED ORAL at 08:04

## 2022-04-06 RX ADMIN — TAMSULOSIN HYDROCHLORIDE 0.4 MG: 0.4 CAPSULE ORAL at 08:04

## 2022-04-06 RX ADMIN — AMLODIPINE BESYLATE 5 MG: 5 TABLET ORAL at 08:04

## 2022-04-06 RX ADMIN — FENOFIBRATE 145 MG: 145 TABLET, FILM COATED ORAL at 08:04

## 2022-04-06 RX ADMIN — POLYETHYLENE GLYCOL (3350) 17 G: 17 POWDER, FOR SOLUTION ORAL at 08:04

## 2022-04-06 RX ADMIN — ENOXAPARIN SODIUM 30 MG: 30 INJECTION SUBCUTANEOUS at 04:04

## 2022-04-06 RX ADMIN — Medication 1000 UNITS: at 08:04

## 2022-04-06 NOTE — PT/OT/SLP PROGRESS
Occupational Therapy  Treatment    Maurice Roy   MRN: [id number]   Admitting Diagnosis: Spinal stenosis of lumbar region with neurogenic claudication    OT Date of Treatment: 04/06/22   AM Start Time: na  AM End Time: na  PM Start Time: 1330  PM End Time: 1500  Treatment Type: Individual 60 and Concurrent 30  Total Time (min): 90 min      Billable Minutes:  Self Care/Home Management 15, Therapeutic Activity 15 and Therapeutic Exercise 60  Total Minutes: 90    General Precautions: Standard,    Orthopedic Precautions: spinal precautions  Braces:      Spiritual, Cultural Beliefs, Buddhist Practices, Values that Affect Care: no    Subjective:  Communicated with nurse prior to session.    Pain/Comfort  Pain Rating 1: 5/10  Location - Side 1: Bilateral  Location 1: thigh  Pain Addressed 1: Reposition, Cessation of Activity, Nurse notified  Pain Rating Post-Intervention 1: 5/10    Objective:  Patient found with: chavarria catheter. Pt was cooperative and motivated without verbal encouragement while exhibiting positive affect. He participated in functional transfer retraining throughout session to / from wheelchair, toilet, and chair emphasizing fall prevention providing extra time with repetition requiring CGA secondary to intermittent steadying assist with mod verbal and tactile cueing for safety awareness and technique utilizing RW. Pt then participated in therapeutic exercise performing 5x15 seated pushups requiring verbal and tactile cueing for technique challenging him to lift buttocks off seated surface to end range elbow extension in order to strengthen triceps brachii to improve performance with sit <> stand transitions. Next, he participated in ADL retraining regarding use of adaptive equipment including sock aide and reacher with LB dressing providing repetition requiring min verbal and tactile cueing for optimal technique. Pt then participated in therapeutic activity addressing functional reaching, static /  dynamic standing balance, standing tolerance, and energy for task / endurance challenging him to reach in multiple planes utilizing bilateral UE's to retrieve, stabilize, manipulate, and place various light items needed to perform functional tasks of ADL's requiring mod verbal and tactile cueing to facilitate optimal movement patterns and posture while sustaining compliance with Spinal Precautions utilizing forward, lateral, and diagonal steps with intermittent steadying assist when challenged outside MALIK.    Functional Mobility:  Transfer Training:   Sit to stand:Contact Guard Assistance with Rolling Walker .  Bed <> Chair:  Step Transfer with Contact Guard Assistance with Rolling Walker .  Toilet Transfer:  Pt Step Transfer with Stand-by Assistance with Grab bars .      Balance:   Static Sit: GOOD: Takes MODERATE challenges from all directions  Dynamic Sit:  FAIR+: Maintains balance through MINIMAL excursions of active trunk motion  Static Stand: FAIR+: Takes MINIMAL challenges from all directions  Dynamic stand: FAIR: Needs CONTACT GUARD during gait      Patient left supine with call button in reach and nurse notified    ASSESSMENT:  Pt demonstrated improved functional performance with toilet transfers as noted by SBA in which he did not require steadying assist or tactile cueing on this date, but most verbal cueing for technique utilizing grab bar.      Rehab potential is good    Activity tolerance: Good    Discharge recommendations: other (see comments) (To be further determined based on progress prior to discharge.)     Equipment recommendations: other (see comments) (To be further determined based on progress prior to discharge.)     GOALS:   Multidisciplinary Problems     Occupational Therapy Goals        Problem: Occupational Therapy    Goal Priority Disciplines Outcome Interventions   Occupational Therapy Goal     OT, PT/OT     Description: Long Term Goals to be met by: 04/11/22     Patient will increase  functional independence with ADLs by performing:    Feeding with [place].  UE Dressing with Modified [place].  LE Dressing with Modified [place].  Grooming while seated at sink with Modified [place].  Toileting from toilet with Modified [place] for hygiene and clothing management.   Bathing from  shower chair/bench with Modified [place].  Step transfer with Modified [place]  Toilet transfer to toilet with Modified [place].  Increased functional strength to 4+/5 for bilateral UE's.                     Plan:  Patient to be seen 5 x/week (for 90 min per day for 14 days) to address the above listed problems via self-care/home management, community/work re-entry, therapeutic activities, therapeutic exercises  Plan of Care expires: 04/11/22  Plan of Care reviewed with: patient         04/06/2022

## 2022-04-06 NOTE — PROGRESS NOTES
[facility] Medicine  Progress Note    Patient Name: Maurice Roy  MRN: [id number]  Patient Class: IP- Rehab   Admission Date: 3/28/2022  Length of Stay: 9 days  Attending Physician: Freeman Kathleen III, MD  Primary Care Provider: Marleni Castillo MD        Subjective:     Principal Problem:Spinal stenosis of lumbar region with neurogenic claudication        HPI:  Patient is a 89-year-old male with a history of generalized arthritis prostate cancer hypertension and lower back pain.  The patient has a history of spinal stenosis and chronic lower back related issues.  At an outside facility the patient underwent a L4-L5 laminectomy and a foraminotomy at L4-L5 and S5-L5 S1.  Postoperatively the patient had pain in the back and tenderness at the surgical site.  His lower extremity weakness and neuropathic type symptoms improved after surgery.  Patient was seen by primary care after surgery and he was placed on antibiotics.  He was also started on antiplatelets and anti-platelet agents postoperatively.  Patient did have an episode of urinary tension postoperatively requiring a Nixon catheter placement urologic consultation and initiation of BPH medications.  Postop the patient also had worsening tremors was seen by Neurology had an abnormality on EKG and was seen by Cardiology and had several medication changes.  Patient also has had several electrolyte abnormalities requiring treatment.  Slowly the patient began to stabilize and he began therapy and he was referred to our inpatient rehab unit for close monitoring routine blood work close physician oversight and rehabilitative care.  I evaluated the patient this morning on the exercise bike he is very eager and motivated to complete therapy and return home.  The patient was still functioning at a modified independent functional state and is hoping to get back to goal of return home.  The patient has multiple skin tear is requiring wound  treatment as well as a hematoma to the right forearm.  Patient is also having dyspnea with speaking and moderate exertion.  Patient's chart has been reviewed feel is an excellent candidate for our unit.        Overview/Hospital Course:  3/30 SD: Pt laying in bed. Localized hematoma  to right forearm - treating with pressure dressing. Denies any complaints. Continue IPR efforts.    3/31 SD: Pt dressing, sitting in wheelchair in room. Wearing back brace. Positive attitude. Bladder training complete. Remove chavarria catheter today.    4/1 SD: Pt sitting on side of bed getting dressed with assistance, then transitions to wheelchair for therapy. Pt had urinary retention of 300ml and inability to urinate after removal of chavarria catheter. A new chavarria catheter was instilled.    4/2 WC:  stable without acute issues.    4/4/22 LFC no acute issues    4/6 SD: Sitting in wheelchair in common area. C/O constipation.      Interval History: Pt seen and evaluated    Review of Systems   Constitutional:  Positive for activity change, appetite change and fatigue. Negative for chills and fever.   HENT:  Negative for congestion, drooling, ear pain, mouth sores, nosebleeds, sinus pressure, sinus pain and sore throat.    Eyes:  Negative for discharge, itching and visual disturbance.   Respiratory:  Negative for apnea, cough, chest tightness and wheezing.    Cardiovascular:  Negative for chest pain and palpitations.   Gastrointestinal:  Negative for abdominal distention, abdominal pain, blood in stool, diarrhea, nausea and vomiting.   Endocrine: Positive for cold intolerance. Negative for heat intolerance and polyphagia.   Genitourinary:  Positive for difficulty urinating. Negative for dysuria, flank pain and frequency.   Musculoskeletal:  Positive for arthralgias and back pain. Negative for myalgias.   Skin:  Negative for rash.   Neurological:  Positive for tremors and weakness. Negative for dizziness, seizures, syncope, facial asymmetry,  speech difficulty and headaches.   Hematological:  Negative for adenopathy.   Psychiatric/Behavioral:  Negative for agitation, confusion and hallucinations. The patient is not nervous/anxious.    Objective:     Vital Signs (Most Recent):  Temp: 97.2 °F (36.2 °C) (04/06/22 0541)  Pulse: 108 (04/06/22 0817)  Resp: 18 (04/06/22 0541)  BP: (!) 187/77 (04/06/22 0817)  SpO2: 98 % (04/06/22 0541)   Vital Signs (24h Range):  Temp:  [97.2 °F (36.2 °C)] 97.2 °F (36.2 °C)  Pulse:  [] 108  Resp:  [18] 18  SpO2:  [98 %] 98 %  BP: (180-187)/(71-77) 187/77     Weight: 77 kg (169 lb 12.8 oz)  Body mass index is 27.41 kg/m².    Intake/Output Summary (Last 24 hours) at 4/6/2022 1757  Last data filed at 4/6/2022 1700  Gross per 24 hour   Intake 1680 ml   Output 1450 ml   Net 230 ml        Physical Exam  Constitutional:       General: He is awake. He is not in acute distress.     Appearance: Normal appearance. He is ill-appearing. He is not toxic-appearing or diaphoretic.   HENT:      Head: Normocephalic and atraumatic.      Nose: Nose normal. No congestion or rhinorrhea.      Mouth/Throat:      Mouth: Mucous membranes are moist.      Pharynx: Oropharynx is clear. No oropharyngeal exudate or posterior oropharyngeal erythema.   Eyes:      General: No scleral icterus.        Right eye: No discharge.         Left eye: No discharge.      Extraocular Movements: Extraocular movements intact.      Pupils: Pupils are equal, round, and reactive to light.   Neck:      Thyroid: No thyroid mass or thyromegaly.      Vascular: No carotid bruit.      Meningeal: Brudzinski's sign and Kernig's sign absent.   Cardiovascular:      Rate and Rhythm: Normal rate and regular rhythm.      Chest Wall: PMI is not displaced. No thrill.      Pulses: Normal pulses.      Heart sounds: Normal heart sounds. No murmur heard.    No friction rub. No gallop.   Pulmonary:      Effort: Pulmonary effort is normal. No tachypnea, accessory muscle usage, prolonged  expiration or respiratory distress.      Breath sounds: Normal breath sounds. No stridor or decreased air movement. No wheezing, rhonchi or rales.   Chest:      Chest wall: No tenderness.   Abdominal:      General: Bowel sounds are normal. There is no distension.      Palpations: Abdomen is soft. There is no hepatomegaly, splenomegaly or mass.      Tenderness: There is no abdominal tenderness. There is no right CVA tenderness, left CVA tenderness, guarding or rebound.      Hernia: No hernia is present.   Musculoskeletal:         General: No swelling, tenderness, deformity or signs of injury.      Cervical back: Normal range of motion and neck supple. No rigidity. No muscular tenderness.   Lymphadenopathy:      Cervical: No cervical adenopathy.   Skin:     General: Skin is warm.      Capillary Refill: Capillary refill takes less than 2 seconds.      Coloration: Skin is not cyanotic, jaundiced or pale.      Findings: Bruising, erythema and lesion present. No petechiae or rash.   Neurological:      Mental Status: He is alert and oriented to person, place, and time.      Cranial Nerves: No cranial nerve deficit, dysarthria or facial asymmetry.      Motor: Weakness present. No tremor.      Gait: Gait abnormal.   Psychiatric:         Mood and Affect: Mood normal. Mood is not anxious or depressed. Affect is not flat.         Speech: Speech is not rapid and pressured or slurred.         Behavior: Behavior normal. Behavior is not agitated, aggressive or combative.         Thought Content: Thought content normal. Thought content is not paranoid or delusional.         Cognition and Memory: Cognition is not impaired. Memory is not impaired.         Judgment: Judgment normal.       Significant Labs: All pertinent labs within the past 24 hours have been reviewed.  Recent Lab Results         04/06/22  0631        Albumin 2.5       Alkaline Phosphatase 103       ALT 32       Anion Gap 6       AST 25       Baso # 0.08       Basophil  % 0.7       BILIRUBIN TOTAL 0.3  Comment: For infants and newborns, interpretation of results should be based  on gestational age, weight and in agreement with clinical  observations.    Premature Infant recommended reference ranges:  Up to 24 hours.............<8.0 mg/dL  Up to 48 hours............<12.0 mg/dL  3-5 days..................<15.0 mg/dL  6-29 days.................<15.0 mg/dL    For patients on Eltrombopag therapy, use of Dimension [place] TBIL is   not   recommended.         BUN 59       Calcium 9.4       Chloride 105       CO2 27       Creatinine 2.0       Differential Method Automated       eGFR if African American 32.5       eGFR if non  28.1  Comment: Calculation used to obtain the estimated glomerular filtration  rate (eGFR) is the CKD-EPI equation.          Eos # 0.4       Eosinophil % 3.8       Glucose 93       Gran # (ANC) 5.5       Gran % 50.7       Hematocrit 26.2       Hemoglobin 8.6       Immature Grans (Abs) 0.03  Comment: Mild elevation in immature granulocytes is non specific and   can be seen in a variety of conditions including stress response,   acute inflammation, trauma and pregnancy. Correlation with other   laboratory and clinical findings is essential.         Immature Granulocytes 0.3       Lymph # 3.3       Lymph % 30.1       Magnesium 2.2       MCH 30.5       MCHC 32.8       MCV 93       Mono # 1.6       Mono % 14.4       MPV 10.7       nRBC 0       Platelets 455       Potassium 5.3       PROTEIN TOTAL 6.1       RBC 2.82       RDW 15.7       Sodium 138       WBC 10.88               Significant Imaging:   CT Forearm (Radius/Ulna) Without Contrast Right  Narrative: EXAMINATION:  CT FOREARM (RADIUS/ULNA) WITHOUT CONTRAST RIGHT    CLINICAL HISTORY:  Soft tissue mass, forearm, deep;    TECHNIQUE:  CT right forearm without IV contrast.  Multiplanar reformats.  Iterative reconstruction used for dose reduction.    CT past 12 months:  2    COMPARISON:  None    FINDINGS:  Lobulated high attenuation subcutaneous mass dorsal-ulnar aspect forearm soft tissues measuring approximately 7 x 6 x 1.5 cm.    Smaller more proximal similar appearing/density lesion subcutaneous tissues of  volar-ulnar aspect measuring approximately 2.5 x 1 cm.  More ill-defined diffuse subcutaneous edema within the forearm and long segment medial skin thickening.    No acute fracture, bone erosion or periosteal reaction.  Impression: 1. 7 x 6 x 1.5 cm high attenuation subcutaneous mass, likely a hematoma  2. Smaller similar appearing lesion more proximally, also likely subcutaneous hematoma  3. Diffuse subcutaneous fat stranding and long segment medial skin tissue thickening, possible cellulitis or nonspecific edema  4. No acute osseous abnormalities    Electronically signed by: Simi Dorantes MD  Date:    03/25/2022  Time:    16:31  US Upper Extremity Veins Right  Narrative: EXAMINATION:  US UPPER EXTREMITY VEINS RIGHT    CLINICAL HISTORY:  post op swelling;    TECHNIQUE:  Grayscale, color and spectral analysis performed    FINDINGS:  PICC line visualized.  Visualized right internal jugular MC clavi in veins are patent.  Axillary and brachial veins are also patent without thrombus visualized.  Impression: Patent deep venous system without evidence of thrombus.  PICC line visualized    Electronically signed by: Simi Dorantes MD  Date:    03/25/2022  Time:    15:46       Assessment/Plan:      * Spinal stenosis of lumbar region with neurogenic claudication  Continue inpatient rehab efforts      Hematoma  Right forearm  Improved      BPH with obstruction/lower urinary tract symptoms  Attempted removal of chavarria catheter 3/31. Resulted in urinary retention of 300ml and inability to urinate. New chavarria catheter instilled.    Hyperkalemia  Monitoring daily    Hyponatremia  Improved. Monitor regular labs.      Acute renal failure  Monitor regular labs and urine  output      Wenckebach's phenomenon, heart block  Stable      Hypertension  Continue home meds. Monitor blood pressure and adjust meds PRN.        VTE Risk Mitigation (From admission, onward)         Ordered     enoxaparin injection 30 mg  Daily         03/28/22 1444     IP VTE HIGH RISK PATIENT  Once         03/28/22 1432     Place sequential compression device  Until discontinued         03/28/22 1432                Discharge Planning   MARCOS:      Code Status: Full Code   Is the patient medically ready for discharge?:     Reason for patient still in hospital (select all that apply): Patient trending condition, Laboratory test, Treatment and PT / OT recommendations  Discharge Plan A: Home Health                  Madiha Brown NP  Department of Hospital Medicine   [facility] ([facility])

## 2022-04-06 NOTE — PT/OT/SLP PROGRESS
Physical Therapy         Treatment        Maurice Roy   MRN: [id number]     PT Received On: 04/06/22  Total Time (min): 90        Billable Minutes:  Gait [id number], Therapeutic Activity 30 and Therapeutic Exercise 30  Total Minutes: 90  AM Start Time: 10  AM End Time: 1130  PM Start Time:   PM End Time:   Treatment Type: Individual 90  Treatment Type: Treatment  PT/PTA: PT             General Precautions: Standard,    Orthopedic Precautions:     Braces:           Subjective:  Communicated with nurse prior to session.         Objective:  Patient found in chair, with      Functional Mobility:  Bed Mobility:   Supine to sit: Supervision or Set-up Assistance   Sit to supine: Supervision or Set-up Assistance   Rolling: Supervision or Set-up Assistance   Scooting: Supervision or Set-up Assistance    Balance:   Static Sit: FAIR+: Able to take MINIMAL challenges from all directions  Dynamic Sit:  FAIR+: Maintains balance through MINIMAL excursions of active trunk motion  Static Stand: FAIR+: Takes MINIMAL challenges from all directions  Dynamic stand: FAIR+: Needs CLOSE SUPERVISION during gait and is able to right self with minor LOB    Transfer Training:  Sit to stand:Contact Guard Assistance with Rolling Walker      Wheelchair Training:  sba 150ft of wc mobility    Gait Training:  Ambulates 150ft with a rw3 times with close sba    Stair Training:  Pt went up and down 4 steps with rail with min/cga      Additional Treatment:  Pt's daughter was shown how to walk with him and how he transfers and uses steps. Pt radha lemus x 15 min at level 3    Activity Tolerance:  Patient tolerated treatment well    Patient left supine with call button in reach.    Assessment:  Maurice Roy is a 89 y.o. male with a medical diagnosis of Spinal stenosis of lumbar region with neurogenic claudication. He presents with about the same fxn'l ability as yesterday.he should do well at home with supervision    Rehab potential is  good.    Activity tolerance: Good    Discharge recommendations: Discharge Facility/Level of Care Needs: other (see comments) (to be determined later)     Equipment recommendations:       GOALS:   Multidisciplinary Problems     Physical Therapy Goals     Not on file                PLAN:    Patient to be seen 5 x/week  to address the above listed problems via gait training, therapeutic activities, wheelchair management/training, therapeutic exercises, therapeutic groups, neuromuscular re-education  Plan of Care expires: 04/11/22  Plan of Care reviewed with: patient         4/6/2022

## 2022-04-06 NOTE — NURSING
Madiha Brown NP was notified of increased edema to patient's BLE and generalized edema increased as well. Patient's heart rate has been running tachy at times with elevated BP this morning. Madiha was notified of this as well. No new orders at this time. Will continue to monitor.

## 2022-04-06 NOTE — SUBJECTIVE & OBJECTIVE
Interval History: Pt seen and evaluated    Review of Systems   Constitutional:  Positive for activity change, appetite change and fatigue. Negative for chills and fever.   HENT:  Negative for congestion, drooling, ear pain, mouth sores, nosebleeds, sinus pressure, sinus pain and sore throat.    Eyes:  Negative for discharge, itching and visual disturbance.   Respiratory:  Negative for apnea, cough, chest tightness and wheezing.    Cardiovascular:  Negative for chest pain and palpitations.   Gastrointestinal:  Negative for abdominal distention, abdominal pain, blood in stool, diarrhea, nausea and vomiting.   Endocrine: Positive for cold intolerance. Negative for heat intolerance and polyphagia.   Genitourinary:  Positive for difficulty urinating. Negative for dysuria, flank pain and frequency.   Musculoskeletal:  Positive for arthralgias and back pain. Negative for myalgias.   Skin:  Negative for rash.   Neurological:  Positive for tremors and weakness. Negative for dizziness, seizures, syncope, facial asymmetry, speech difficulty and headaches.   Hematological:  Negative for adenopathy.   Psychiatric/Behavioral:  Negative for agitation, confusion and hallucinations. The patient is not nervous/anxious.    Objective:     Vital Signs (Most Recent):  Temp: 97.2 °F (36.2 °C) (04/06/22 0541)  Pulse: 108 (04/06/22 0817)  Resp: 18 (04/06/22 0541)  BP: (!) 187/77 (04/06/22 0817)  SpO2: 98 % (04/06/22 0541)   Vital Signs (24h Range):  Temp:  [97.2 °F (36.2 °C)] 97.2 °F (36.2 °C)  Pulse:  [] 108  Resp:  [18] 18  SpO2:  [98 %] 98 %  BP: (180-187)/(71-77) 187/77     Weight: 77 kg (169 lb 12.8 oz)  Body mass index is 27.41 kg/m².    Intake/Output Summary (Last 24 hours) at 4/6/2022 1757  Last data filed at 4/6/2022 1700  Gross per 24 hour   Intake 1680 ml   Output 1450 ml   Net 230 ml        Physical Exam  Constitutional:       General: He is awake. He is not in acute distress.     Appearance: Normal appearance. He is  ill-appearing. He is not toxic-appearing or diaphoretic.   HENT:      Head: Normocephalic and atraumatic.      Nose: Nose normal. No congestion or rhinorrhea.      Mouth/Throat:      Mouth: Mucous membranes are moist.      Pharynx: Oropharynx is clear. No oropharyngeal exudate or posterior oropharyngeal erythema.   Eyes:      General: No scleral icterus.        Right eye: No discharge.         Left eye: No discharge.      Extraocular Movements: Extraocular movements intact.      Pupils: Pupils are equal, round, and reactive to light.   Neck:      Thyroid: No thyroid mass or thyromegaly.      Vascular: No carotid bruit.      Meningeal: Brudzinski's sign and Kernig's sign absent.   Cardiovascular:      Rate and Rhythm: Normal rate and regular rhythm.      Chest Wall: PMI is not displaced. No thrill.      Pulses: Normal pulses.      Heart sounds: Normal heart sounds. No murmur heard.    No friction rub. No gallop.   Pulmonary:      Effort: Pulmonary effort is normal. No tachypnea, accessory muscle usage, prolonged expiration or respiratory distress.      Breath sounds: Normal breath sounds. No stridor or decreased air movement. No wheezing, rhonchi or rales.   Chest:      Chest wall: No tenderness.   Abdominal:      General: Bowel sounds are normal. There is no distension.      Palpations: Abdomen is soft. There is no hepatomegaly, splenomegaly or mass.      Tenderness: There is no abdominal tenderness. There is no right CVA tenderness, left CVA tenderness, guarding or rebound.      Hernia: No hernia is present.   Musculoskeletal:         General: No swelling, tenderness, deformity or signs of injury.      Cervical back: Normal range of motion and neck supple. No rigidity. No muscular tenderness.   Lymphadenopathy:      Cervical: No cervical adenopathy.   Skin:     General: Skin is warm.      Capillary Refill: Capillary refill takes less than 2 seconds.      Coloration: Skin is not cyanotic, jaundiced or pale.       Findings: Bruising, erythema and lesion present. No petechiae or rash.   Neurological:      Mental Status: He is alert and oriented to person, place, and time.      Cranial Nerves: No cranial nerve deficit, dysarthria or facial asymmetry.      Motor: Weakness present. No tremor.      Gait: Gait abnormal.   Psychiatric:         Mood and Affect: Mood normal. Mood is not anxious or depressed. Affect is not flat.         Speech: Speech is not rapid and pressured or slurred.         Behavior: Behavior normal. Behavior is not agitated, aggressive or combative.         Thought Content: Thought content normal. Thought content is not paranoid or delusional.         Cognition and Memory: Cognition is not impaired. Memory is not impaired.         Judgment: Judgment normal.       Significant Labs: All pertinent labs within the past 24 hours have been reviewed.  Recent Lab Results         04/06/22  0631        Albumin 2.5       Alkaline Phosphatase 103       ALT 32       Anion Gap 6       AST 25       Baso # 0.08       Basophil % 0.7       BILIRUBIN TOTAL 0.3  Comment: For infants and newborns, interpretation of results should be based  on gestational age, weight and in agreement with clinical  observations.    Premature Infant recommended reference ranges:  Up to 24 hours.............<8.0 mg/dL  Up to 48 hours............<12.0 mg/dL  3-5 days..................<15.0 mg/dL  6-29 days.................<15.0 mg/dL    For patients on Eltrombopag therapy, use of Dimension [place] TBIL is   not   recommended.         BUN 59       Calcium 9.4       Chloride 105       CO2 27       Creatinine 2.0       Differential Method Automated       eGFR if African American 32.5       eGFR if non  28.1  Comment: Calculation used to obtain the estimated glomerular filtration  rate (eGFR) is the CKD-EPI equation.          Eos # 0.4       Eosinophil % 3.8       Glucose 93       Gran # (ANC) 5.5       Gran % 50.7       Hematocrit 26.2        Hemoglobin 8.6       Immature Grans (Abs) 0.03  Comment: Mild elevation in immature granulocytes is non specific and   can be seen in a variety of conditions including stress response,   acute inflammation, trauma and pregnancy. Correlation with other   laboratory and clinical findings is essential.         Immature Granulocytes 0.3       Lymph # 3.3       Lymph % 30.1       Magnesium 2.2       MCH 30.5       MCHC 32.8       MCV 93       Mono # 1.6       Mono % 14.4       MPV 10.7       nRBC 0       Platelets 455       Potassium 5.3       PROTEIN TOTAL 6.1       RBC 2.82       RDW 15.7       Sodium 138       WBC 10.88               Significant Imaging:   CT Forearm (Radius/Ulna) Without Contrast Right  Narrative: EXAMINATION:  CT FOREARM (RADIUS/ULNA) WITHOUT CONTRAST RIGHT    CLINICAL HISTORY:  Soft tissue mass, forearm, deep;    TECHNIQUE:  CT right forearm without IV contrast.  Multiplanar reformats.  Iterative reconstruction used for dose reduction.    CT past 12 months: 2    COMPARISON:  None    FINDINGS:  Lobulated high attenuation subcutaneous mass dorsal-ulnar aspect forearm soft tissues measuring approximately 7 x 6 x 1.5 cm.    Smaller more proximal similar appearing/density lesion subcutaneous tissues of  volar-ulnar aspect measuring approximately 2.5 x 1 cm.  More ill-defined diffuse subcutaneous edema within the forearm and long segment medial skin thickening.    No acute fracture, bone erosion or periosteal reaction.  Impression: 1. 7 x 6 x 1.5 cm high attenuation subcutaneous mass, likely a hematoma  2. Smaller similar appearing lesion more proximally, also likely subcutaneous hematoma  3. Diffuse subcutaneous fat stranding and long segment medial skin tissue thickening, possible cellulitis or nonspecific edema  4. No acute osseous abnormalities    Electronically signed by: Simi Dorantes MD  Date:    03/25/2022  Time:    16:31  US Upper Extremity Veins Right  Narrative: EXAMINATION:  US UPPER  EXTREMITY VEINS RIGHT    CLINICAL HISTORY:  post op swelling;    TECHNIQUE:  Grayscale, color and spectral analysis performed    FINDINGS:  PICC line visualized.  Visualized right internal jugular MC clavi in veins are patent.  Axillary and brachial veins are also patent without thrombus visualized.  Impression: Patent deep venous system without evidence of thrombus.  PICC line visualized    Electronically signed by: Simi Dorantes MD  Date:    03/25/2022  Time:    15:46

## 2022-04-06 NOTE — NURSING
Patient still complaining of not being able to have a bm. He stated he tried to go earlier, but not much came out. He is on miralax QD which he does take every day. Madiha Brown NP was notified. No new orders at this time.

## 2022-04-07 PROCEDURE — 63600175 PHARM REV CODE 636 W HCPCS: Performed by: INTERNAL MEDICINE

## 2022-04-07 PROCEDURE — 97110 THERAPEUTIC EXERCISES: CPT

## 2022-04-07 PROCEDURE — 99900035 HC TECH TIME PER 15 MIN (STAT)

## 2022-04-07 PROCEDURE — 25000003 PHARM REV CODE 250: Performed by: INTERNAL MEDICINE

## 2022-04-07 PROCEDURE — 99900031 HC PATIENT EDUCATION (STAT)

## 2022-04-07 PROCEDURE — 94640 AIRWAY INHALATION TREATMENT: CPT

## 2022-04-07 PROCEDURE — 11000001 HC ACUTE MED/SURG PRIVATE ROOM

## 2022-04-07 PROCEDURE — 97116 GAIT TRAINING THERAPY: CPT

## 2022-04-07 PROCEDURE — 97535 SELF CARE MNGMENT TRAINING: CPT

## 2022-04-07 PROCEDURE — 94761 N-INVAS EAR/PLS OXIMETRY MLT: CPT

## 2022-04-07 PROCEDURE — 97530 THERAPEUTIC ACTIVITIES: CPT

## 2022-04-07 PROCEDURE — 25000242 PHARM REV CODE 250 ALT 637 W/ HCPCS: Performed by: NURSE PRACTITIONER

## 2022-04-07 PROCEDURE — 25000003 PHARM REV CODE 250: Performed by: NURSE PRACTITIONER

## 2022-04-07 RX ORDER — IPRATROPIUM BROMIDE AND ALBUTEROL SULFATE 2.5; .5 MG/3ML; MG/3ML
3 SOLUTION RESPIRATORY (INHALATION)
Status: DISCONTINUED | OUTPATIENT
Start: 2022-04-07 | End: 2022-04-08

## 2022-04-07 RX ORDER — GUAIFENESIN/DEXTROMETHORPHAN 100-10MG/5
10 SYRUP ORAL EVERY 4 HOURS PRN
Status: DISCONTINUED | OUTPATIENT
Start: 2022-04-07 | End: 2022-04-12 | Stop reason: HOSPADM

## 2022-04-07 RX ORDER — GUAIFENESIN/DEXTROMETHORPHAN 100-10MG/5
10 SYRUP ORAL EVERY 4 HOURS PRN
Status: DISCONTINUED | OUTPATIENT
Start: 2022-04-07 | End: 2022-04-07

## 2022-04-07 RX ADMIN — ASPIRIN 81 MG: 81 TABLET, COATED ORAL at 08:04

## 2022-04-07 RX ADMIN — Medication 1000 UNITS: at 08:04

## 2022-04-07 RX ADMIN — POLYETHYLENE GLYCOL (3350) 17 G: 17 POWDER, FOR SOLUTION ORAL at 08:04

## 2022-04-07 RX ADMIN — TAMSULOSIN HYDROCHLORIDE 0.4 MG: 0.4 CAPSULE ORAL at 08:04

## 2022-04-07 RX ADMIN — LACTULOSE 20 G: 20 SOLUTION ORAL at 04:04

## 2022-04-07 RX ADMIN — IPRATROPIUM BROMIDE AND ALBUTEROL SULFATE 3 ML: 2.5; .5 SOLUTION RESPIRATORY (INHALATION) at 02:04

## 2022-04-07 RX ADMIN — IPRATROPIUM BROMIDE AND ALBUTEROL SULFATE 3 ML: 2.5; .5 SOLUTION RESPIRATORY (INHALATION) at 08:04

## 2022-04-07 RX ADMIN — AMLODIPINE BESYLATE 5 MG: 5 TABLET ORAL at 09:04

## 2022-04-07 RX ADMIN — TAMSULOSIN HYDROCHLORIDE 0.4 MG: 0.4 CAPSULE ORAL at 09:04

## 2022-04-07 RX ADMIN — MIRTAZAPINE 7.5 MG: 7.5 TABLET ORAL at 09:04

## 2022-04-07 RX ADMIN — LACTULOSE 20 G: 20 SOLUTION ORAL at 10:04

## 2022-04-07 RX ADMIN — FENOFIBRATE 145 MG: 145 TABLET, FILM COATED ORAL at 09:04

## 2022-04-07 RX ADMIN — ENOXAPARIN SODIUM 30 MG: 30 INJECTION SUBCUTANEOUS at 04:04

## 2022-04-07 RX ADMIN — THERA TABS 1 TABLET: TAB at 08:04

## 2022-04-07 NOTE — NURSING
0820 Upon assessing patient this am. Patient noted to have wheezing on expiratory breathing relieved with cough. Patient also noted to have green sputum. Notified Dr. Kathleen via secure chat. Awaiting orders.

## 2022-04-07 NOTE — PT/OT/SLP PROGRESS
Occupational Therapy  Weekly Progress Note    Maurice Roy   MRN: [id number]   Admitting Diagnosis: Spinal stenosis of lumbar region with neurogenic claudication    OT Date of Treatment: 04/07/22   AM Start Time: 0900  AM End Time: 1100  PM Start Time: na  PM End Time: na  Treatment Type: Individual 90 and Concurrent 30   Total Time (min): 120 min      Billable Minutes:  Self Care/Home Management 60, Therapeutic Activity 30 and Therapeutic Exercise 30  Total Minutes: 120    General Precautions: Standard,    Orthopedic Precautions: spinal precautions  Braces:      Spiritual, Cultural Beliefs, Congregation Practices, Values that Affect Care: no    Subjective:  Communicated with nurse prior to session.    Pain/Comfort  Pain Rating 1: 5/10  Location - Side 1: Bilateral  Location - Orientation 1: lower  Location 1: back  Pain Addressed 1: Reposition, Cessation of Activity, Nurse notified  Pain Rating Post-Intervention 1: 5/10    Objective:  Patient found with: chavarria catheter. Pt was cooperative and motivated with minimal verbal encouragement while exhibiting positive affect. He participated in extensive ADL retraining as further noted below emphasizing fall prevention, and use of compensatory strategies, assistive devices, and adaptive equipment in order to sustain Spinal Precautions providing extra time requiring min verbal and tactile cueing for safety awareness and technique. Also, he participated in therapeutic exercise performing 3x15 bilateral UE proximal strengthening exercises with scapular retraction, shoulder extension, shoulder adduction, horizontal abduction, shoulder flexion in plane of scaption, internal rotation, and external rotation while seated in chair unsupported requiring min-mod verbal and tactile cueing for technique while sustaining Spinal Precautions. Pt then participated in therapeutic activity addressing functional reaching, static / dynamic standing balance, standing tolerance, and energy  for task / endurance challenging him to reach in multiple planes utilizing bilateral UE's to retrieve, stabilize, manipulate, and place various light items needed to perform functional tasks of ADL's requiring mod verbal and tactile cueing to facilitate optimal movement patterns and posture while sustaining compliance with Spinal Precautions utilizing forward, lateral, and diagonal steps with CGA assist when challenged outside MALIK.     Functional Mobility:  Bed Mobility:   Supine to sit: Supervision or Set-up Assistance   Sit to supine: Supervision or Set-up Assistance   Rolling: Supervision or Set-up Assistance   Scooting: Supervision or Set-up Assistance    Transfer Training:   Sit to stand:Stand-by Assistance with Rolling Walker .  Bed <> Chair:  Step Transfer with Stand-by Assistance with Rolling Walker .  Toilet Transfer:  Pt Step Transfer with Stand-by Assistance with Grab bars .  Patient performed shower transfer Step Transfer with Stand-by Assistance with Grab bars and shower chair.    Feeding:  Patient performed feeding with Modified Independent with dentures  .    Grooming:  Patient peformed hand washing with Modified Independent at sitting at sink.  Patient performed face washing with Modified Independent at sitting at sink.  Patient performed oral hygeine with Modified Independent at sitting at sink.  Patient performe hair grooming with Modified Independent at sitting at sink.    Bathing:  Patient performed bathing with Stand-by Assistance with grab bar, Handheld shower head, long-handled sponge and shower chair at Shower.    UE Dressing:  Patient performed UE Dressing with Supervision or Set-up Assistance with extra time at Shower.    LE Dressing:  Patient don/doffed socks with Stand-by Assistance, Patient don/doffed shoes with Minimal Assistance, Patient performed don/doffed adult brief with Minimal Assistance and Patient performed don/doffed pants with Minimal Assistance    Toilet Training:  Pt  performed toileting with Minimal Assistance with Grab  bar at [employer].    Balance:   Static Sit: GOOD: Takes MODERATE challenges from all directions  Dynamic Sit:  FAIR+: Maintains balance through MINIMAL excursions of active trunk motion  Static Stand: FAIR+: Takes MINIMAL challenges from all directions  Dynamic stand: FAIR+: Needs CLOSE SUPERVISION during gait and is able to right self with minor LOB      Patient left up in chair with nurse notified    ASSESSMENT:  Pt demonstrated continued progress with functional goals as noted by continued changes in functional scores in which patient able to achieve 8/8 STG's and 2/9 LTG's. Pt now min assist to supervision / setup assistance with ADL's including functional transfers with extra time, and use of assistive devices, adaptive equipment, and compensatory strategies in order to sustain Spinal Precautions. Discharge planning and patient / family training performed during previous day. All questions and concerns were addressed prior to conclusion.     Rehab potential is good    Activity tolerance: Good    Discharge recommendations: other (see comments) (To be further determined based on progress prior to discharge.)     Equipment recommendations: other (see comments) (To be further determined based on progress prior to discharge.)     GOALS:   Short Term Goals to be met by: 04/04/22      Patient will increase functional independence with ADLs by performing:     UE Dressing with Minimal Assistance. MET  LE Dressing with Moderate Assistance. MET  Grooming while seated at sink with Set-up Assistance. MET  Toileting from toilet with Minimal Assistance for hygiene and clothing management. MET  Bathing from  shower chair/bench with Minimal Assistance. MET  Step transfer with Minimal Assistance. MET  Toilet transfer to toilet with Minimal Assistance. MET  Increased functional strength to 4/5 for bilateral UE's. MET     Long Term Goals to be met by: 04/11/22      Patient will  increase functional independence with ADLs by performing:     Feeding with [place]. MET   UE Dressing with Modified [place]. ONGOING - Setup Assistance  LE Dressing with Modified [place]. ONGOING - Min Assist  Grooming while seated at sink with Modified [place]. MET  Toileting from toilet with Modified [place] for hygiene and clothing management. ONGOING - Min Assist  Bathing from  shower chair/bench with Modified [place]. ONGOING - Supervision  Step transfer with Modified [place]. ONGOING - SBA  Toilet transfer to toilet with Modified [place]. ONGOING - SBA  Increased functional strength to 4+/5 for bilateral UE's. ONGOING -  4 / 5    Plan:  Patient to be seen 5 x/week (for 90 min per day for 14 days) to address the above listed problems via self-care/home management, community/work re-entry, therapeutic activities, therapeutic exercises  Plan of Care expires: 04/11/22  Plan of Care reviewed with: patient         04/07/2022

## 2022-04-07 NOTE — SUBJECTIVE & OBJECTIVE
Interval History: Pt seen and evaluated    Review of Systems   Constitutional:  Positive for activity change, appetite change and fatigue. Negative for chills and fever.   HENT:  Negative for drooling, ear pain, mouth sores, nosebleeds and sore throat.    Eyes:  Negative for discharge, itching and visual disturbance.   Respiratory:  Positive for cough. Negative for apnea, chest tightness, shortness of breath and wheezing.    Cardiovascular:  Negative for chest pain and palpitations.   Gastrointestinal:  Positive for constipation. Negative for abdominal distention, abdominal pain, blood in stool, diarrhea, nausea and vomiting.   Endocrine: Positive for cold intolerance. Negative for heat intolerance and polyphagia.   Genitourinary:  Positive for difficulty urinating. Negative for dysuria, flank pain and frequency.   Musculoskeletal:  Positive for arthralgias and back pain. Negative for myalgias.   Skin:  Negative for rash.   Neurological:  Positive for tremors and weakness. Negative for dizziness, seizures, syncope, facial asymmetry, speech difficulty and headaches.   Hematological:  Negative for adenopathy.   Psychiatric/Behavioral:  Negative for agitation, confusion and hallucinations. The patient is not nervous/anxious.    Objective:     Vital Signs (Most Recent):  Temp: 98.3 °F (36.8 °C) (04/07/22 0645)  Pulse: 104 (04/07/22 0645)  Resp: 18 (04/07/22 0645)  BP: (!) 144/65 (04/07/22 0645)  SpO2: 97 % (04/07/22 0645)   Vital Signs (24h Range):  Temp:  [98 °F (36.7 °C)-98.3 °F (36.8 °C)] 98.3 °F (36.8 °C)  Pulse:  [] 104  Resp:  [18-20] 18  SpO2:  [97 %-100 %] 97 %  BP: (144-173)/(65-75) 144/65     Weight: 77 kg (169 lb 12.8 oz)  Body mass index is 27.41 kg/m².    Intake/Output Summary (Last 24 hours) at 4/7/2022 1308  Last data filed at 4/7/2022 1000  Gross per 24 hour   Intake 1080 ml   Output 1300 ml   Net -220 ml        Physical Exam  Constitutional:       General: He is awake. He is not in acute  distress.     Appearance: Normal appearance. He is not ill-appearing, toxic-appearing or diaphoretic.   HENT:      Head: Normocephalic and atraumatic.      Nose: Nose normal. No congestion or rhinorrhea.      Mouth/Throat:      Mouth: Mucous membranes are moist.      Pharynx: Oropharynx is clear. No oropharyngeal exudate or posterior oropharyngeal erythema.   Eyes:      General: No scleral icterus.        Right eye: No discharge.         Left eye: No discharge.      Extraocular Movements: Extraocular movements intact.      Pupils: Pupils are equal, round, and reactive to light.   Neck:      Thyroid: No thyroid mass or thyromegaly.      Vascular: No carotid bruit.      Meningeal: Brudzinski's sign and Kernig's sign absent.   Cardiovascular:      Rate and Rhythm: Normal rate and regular rhythm.      Chest Wall: PMI is not displaced. No thrill.      Pulses: Normal pulses.      Heart sounds: Normal heart sounds. No murmur heard.    No friction rub. No gallop.   Pulmonary:      Effort: Pulmonary effort is normal. No tachypnea, accessory muscle usage, prolonged expiration or respiratory distress.      Breath sounds: Normal breath sounds. No stridor or decreased air movement. No wheezing, rhonchi or rales.   Chest:      Chest wall: No tenderness.   Abdominal:      General: Bowel sounds are normal. There is no distension.      Palpations: Abdomen is soft. There is no hepatomegaly, splenomegaly or mass.      Tenderness: There is no abdominal tenderness. There is no right CVA tenderness, left CVA tenderness, guarding or rebound.      Hernia: No hernia is present.   Musculoskeletal:         General: No swelling, tenderness, deformity or signs of injury.      Cervical back: Normal range of motion and neck supple. No rigidity. No muscular tenderness.   Lymphadenopathy:      Cervical: No cervical adenopathy.   Skin:     General: Skin is warm.      Capillary Refill: Capillary refill takes less than 2 seconds.      Coloration: Skin  is not cyanotic, jaundiced or pale.      Findings: Bruising, erythema and lesion present. No petechiae or rash.   Neurological:      Mental Status: He is alert and oriented to person, place, and time.      Cranial Nerves: No cranial nerve deficit, dysarthria or facial asymmetry.      Motor: Weakness present. No tremor.      Gait: Gait abnormal.   Psychiatric:         Mood and Affect: Mood normal. Mood is not anxious or depressed. Affect is not flat.         Speech: Speech is not rapid and pressured or slurred.         Behavior: Behavior normal. Behavior is not agitated, aggressive or combative.         Thought Content: Thought content normal. Thought content is not paranoid or delusional.         Cognition and Memory: Cognition is not impaired. Memory is not impaired.         Judgment: Judgment normal.       Significant Labs: All pertinent labs within the past 24 hours have been reviewed.  Recent Lab Results       None            Significant Imaging: I have reviewed all pertinent imaging results/findings within the past 24 hours.

## 2022-04-07 NOTE — PT/OT/SLP PROGRESS
Physical Therapy         Treatment        Maurice Roy   MRN: [id number]     PT Received On: 04/07/22  Total Time (min): 90        Billable Minutes:  Gait [id number], Therapeutic Activity 30 and Therapeutic Exercise 30  Total Minutes: 90  AM Start Time:   AM End Time:   PM Start Time: 1500  PM End Time: 1630  Treatment Type: Individual 75 and Concurrent 15  Treatment Type: Treatment  PT/PTA: PT             General Precautions: Standard,    Orthopedic Precautions:     Braces:           Subjective:  Communicated with nurse prior to session.         Objective:  Patient found in bed, with      Functional Mobility:  Bed Mobility:   Supine to sit: Supervision or Set-up Assistance   Sit to supine: Supervision or Set-up Assistance   Rolling: Supervision or Set-up Assistance   Scooting: Supervision or Set-up Assistance    Balance:   Static Sit: GOOD: Takes MODERATE challenges from all directions  Dynamic Sit:  GOOD: Maintains balance through MODERATE excursions of active trunk movement  Static Stand: FAIR+: Takes MINIMAL challenges from all directions  Dynamic stand: FAIR+: Needs CLOSE SUPERVISION during gait and is able to right self with minor LOB    Transfer Training:  Sit to stand:Stand-by Assistance with Rolling Walker    Bed <> Chair:  Step Transfer with Stand-by Assistance with Rolling Walker      Wheelchair Training:  sba with  mobility    Gait Training:  Ambulates 150ft with a rw sba    Stair Training:  Up and down 4 steps min/cga      Additional Treatment:  performed 3 sets 10 reps with sit to stand with close sba. Pt was sitting on a seat cushion making it easier    Activity Tolerance:  Patient tolerated treatment well    Patient left supine with call button in reach.    Assessment:  Maurice Roy is a 89 y.o. male with a medical diagnosis of Spinal stenosis of lumbar region with neurogenic claudication. He presents with . He did better with transfers today usin a seat cushion in .    Rehab  potential is good.    Activity tolerance: Good    Discharge recommendations: Discharge Facility/Level of Care Needs: other (see comments) (to be determined later)     Equipment recommendations:       GOALS:   Multidisciplinary Problems     Physical Therapy Goals     Not on file                PLAN:    Patient to be seen 5 x/week  to address the above listed problems via gait training, therapeutic activities, wheelchair management/training, therapeutic exercises, therapeutic groups, neuromuscular re-education  Plan of Care expires: 04/11/22  Plan of Care reviewed with: patient         4/7/2022

## 2022-04-07 NOTE — PROGRESS NOTES
[facility] Medicine  Progress Note    Patient Name: Maurice Roy  MRN: [id number]  Patient Class: IP- Rehab   Admission Date: 3/28/2022  Length of Stay: 10 days  Attending Physician: Freeman Kathleen III, MD  Primary Care Provider: Marleni Castillo MD        Subjective:     Principal Problem:Spinal stenosis of lumbar region with neurogenic claudication        HPI:  Patient is a 89-year-old male with a history of generalized arthritis prostate cancer hypertension and lower back pain.  The patient has a history of spinal stenosis and chronic lower back related issues.  At an outside facility the patient underwent a L4-L5 laminectomy and a foraminotomy at L4-L5 and S5-L5 S1.  Postoperatively the patient had pain in the back and tenderness at the surgical site.  His lower extremity weakness and neuropathic type symptoms improved after surgery.  Patient was seen by primary care after surgery and he was placed on antibiotics.  He was also started on antiplatelets and anti-platelet agents postoperatively.  Patient did have an episode of urinary tension postoperatively requiring a Nixon catheter placement urologic consultation and initiation of BPH medications.  Postop the patient also had worsening tremors was seen by Neurology had an abnormality on EKG and was seen by Cardiology and had several medication changes.  Patient also has had several electrolyte abnormalities requiring treatment.  Slowly the patient began to stabilize and he began therapy and he was referred to our inpatient rehab unit for close monitoring routine blood work close physician oversight and rehabilitative care.  I evaluated the patient this morning on the exercise bike he is very eager and motivated to complete therapy and return home.  The patient was still functioning at a modified independent functional state and is hoping to get back to goal of return home.  The patient has multiple skin tear is requiring wound  treatment as well as a hematoma to the right forearm.  Patient is also having dyspnea with speaking and moderate exertion.  Patient's chart has been reviewed feel is an excellent candidate for our unit.        Overview/Hospital Course:  3/30 SD: Pt laying in bed. Localized hematoma  to right forearm - treating with pressure dressing. Denies any complaints. Continue IPR efforts.    3/31 SD: Pt dressing, sitting in wheelchair in room. Wearing back brace. Positive attitude. Bladder training complete. Remove chavarria catheter today.    4/1 SD: Pt sitting on side of bed getting dressed with assistance, then transitions to wheelchair for therapy. Pt had urinary retention of 300ml and inability to urinate after removal of chavarria catheter. A new chavarria catheter was instilled.    4/2 WC:  stable without acute issues.    4/4/22 LFC no acute issues    4/6 SD: Sitting in wheelchair in common area. C/O constipation.    4/7 SD: C/O cough productive of green sputum. Denies fever or chills.       Interval History: Pt seen and evaluated    Review of Systems   Constitutional:  Positive for activity change, appetite change and fatigue. Negative for chills and fever.   HENT:  Negative for drooling, ear pain, mouth sores, nosebleeds and sore throat.    Eyes:  Negative for discharge, itching and visual disturbance.   Respiratory:  Positive for cough. Negative for apnea, chest tightness, shortness of breath and wheezing.    Cardiovascular:  Negative for chest pain and palpitations.   Gastrointestinal:  Positive for constipation. Negative for abdominal distention, abdominal pain, blood in stool, diarrhea, nausea and vomiting.   Endocrine: Positive for cold intolerance. Negative for heat intolerance and polyphagia.   Genitourinary:  Positive for difficulty urinating. Negative for dysuria, flank pain and frequency.   Musculoskeletal:  Positive for arthralgias and back pain. Negative for myalgias.   Skin:  Negative for rash.   Neurological:   Positive for tremors and weakness. Negative for dizziness, seizures, syncope, facial asymmetry, speech difficulty and headaches.   Hematological:  Negative for adenopathy.   Psychiatric/Behavioral:  Negative for agitation, confusion and hallucinations. The patient is not nervous/anxious.    Objective:     Vital Signs (Most Recent):  Temp: 98.3 °F (36.8 °C) (04/07/22 0645)  Pulse: 104 (04/07/22 0645)  Resp: 18 (04/07/22 0645)  BP: (!) 144/65 (04/07/22 0645)  SpO2: 97 % (04/07/22 0645)   Vital Signs (24h Range):  Temp:  [98 °F (36.7 °C)-98.3 °F (36.8 °C)] 98.3 °F (36.8 °C)  Pulse:  [] 104  Resp:  [18-20] 18  SpO2:  [97 %-100 %] 97 %  BP: (144-173)/(65-75) 144/65     Weight: 77 kg (169 lb 12.8 oz)  Body mass index is 27.41 kg/m².    Intake/Output Summary (Last 24 hours) at 4/7/2022 1308  Last data filed at 4/7/2022 1000  Gross per 24 hour   Intake 1080 ml   Output 1300 ml   Net -220 ml        Physical Exam  Constitutional:       General: He is awake. He is not in acute distress.     Appearance: Normal appearance. He is not ill-appearing, toxic-appearing or diaphoretic.   HENT:      Head: Normocephalic and atraumatic.      Nose: Nose normal. No congestion or rhinorrhea.      Mouth/Throat:      Mouth: Mucous membranes are moist.      Pharynx: Oropharynx is clear. No oropharyngeal exudate or posterior oropharyngeal erythema.   Eyes:      General: No scleral icterus.        Right eye: No discharge.         Left eye: No discharge.      Extraocular Movements: Extraocular movements intact.      Pupils: Pupils are equal, round, and reactive to light.   Neck:      Thyroid: No thyroid mass or thyromegaly.      Vascular: No carotid bruit.      Meningeal: Brudzinski's sign and Kernig's sign absent.   Cardiovascular:      Rate and Rhythm: Normal rate and regular rhythm.      Chest Wall: PMI is not displaced. No thrill.      Pulses: Normal pulses.      Heart sounds: Normal heart sounds. No murmur heard.    No friction rub. No  gallop.   Pulmonary:      Effort: Pulmonary effort is normal. No tachypnea, accessory muscle usage, prolonged expiration or respiratory distress.      Breath sounds: Normal breath sounds. No stridor or decreased air movement. No wheezing, rhonchi or rales.   Chest:      Chest wall: No tenderness.   Abdominal:      General: Bowel sounds are normal. There is no distension.      Palpations: Abdomen is soft. There is no hepatomegaly, splenomegaly or mass.      Tenderness: There is no abdominal tenderness. There is no right CVA tenderness, left CVA tenderness, guarding or rebound.      Hernia: No hernia is present.   Musculoskeletal:         General: No swelling, tenderness, deformity or signs of injury.      Cervical back: Normal range of motion and neck supple. No rigidity. No muscular tenderness.   Lymphadenopathy:      Cervical: No cervical adenopathy.   Skin:     General: Skin is warm.      Capillary Refill: Capillary refill takes less than 2 seconds.      Coloration: Skin is not cyanotic, jaundiced or pale.      Findings: Bruising, erythema and lesion present. No petechiae or rash.   Neurological:      Mental Status: He is alert and oriented to person, place, and time.      Cranial Nerves: No cranial nerve deficit, dysarthria or facial asymmetry.      Motor: Weakness present. No tremor.      Gait: Gait abnormal.   Psychiatric:         Mood and Affect: Mood normal. Mood is not anxious or depressed. Affect is not flat.         Speech: Speech is not rapid and pressured or slurred.         Behavior: Behavior normal. Behavior is not agitated, aggressive or combative.         Thought Content: Thought content normal. Thought content is not paranoid or delusional.         Cognition and Memory: Cognition is not impaired. Memory is not impaired.         Judgment: Judgment normal.       Significant Labs: All pertinent labs within the past 24 hours have been reviewed.  Recent Lab Results       None            Significant  Imaging: I have reviewed all pertinent imaging results/findings within the past 24 hours.      Assessment/Plan:      * Spinal stenosis of lumbar region with neurogenic claudication  Continue inpatient rehab efforts      Hematoma  Right forearm  Improved      BPH with obstruction/lower urinary tract symptoms  Attempted removal of chavarria catheter 3/31. Resulted in urinary retention of 300ml and inability to urinate. New chavarria catheter instilled.    Hyperkalemia  Monitor regular labs    Hyponatremia  Improved. Monitor regular labs.      Acute renal failure  Monitor regular labs and urine output      Wenckebach's phenomenon, heart block  Stable      Hypertension  Continue home meds. Monitor blood pressure and adjust meds PRN.        VTE Risk Mitigation (From admission, onward)         Ordered     enoxaparin injection 30 mg  Daily         03/28/22 1444     IP VTE HIGH RISK PATIENT  Once         03/28/22 1432     Place sequential compression device  Until discontinued         03/28/22 1432                Discharge Planning   MARCOS:      Code Status: Full Code   Is the patient medically ready for discharge?:     Reason for patient still in hospital (select all that apply): Patient trending condition, Treatment and PT / OT recommendations  Discharge Plan A: Home Health                  Madiha Brown NP  Department of Hospital Medicine   [facility] ([facility])

## 2022-04-08 LAB
ANION GAP SERPL CALC-SCNC: 5 MMOL/L (ref 8–16)
BUN SERPL-MCNC: 61 MG/DL (ref 10–30)
CALCIUM SERPL-MCNC: 8.6 MG/DL (ref 8.7–10.5)
CHLORIDE SERPL-SCNC: 105 MMOL/L (ref 95–110)
CO2 SERPL-SCNC: 28 MMOL/L (ref 23–29)
CREAT SERPL-MCNC: 2.1 MG/DL (ref 0.5–1.4)
EST. GFR  (AFRICAN AMERICAN): 30.7 ML/MIN/1.73 M^2
EST. GFR  (NON AFRICAN AMERICAN): 26.5 ML/MIN/1.73 M^2
GLUCOSE SERPL-MCNC: 96 MG/DL (ref 70–110)
POTASSIUM SERPL-SCNC: 5 MMOL/L (ref 3.5–5.1)
SODIUM SERPL-SCNC: 138 MMOL/L (ref 136–145)

## 2022-04-08 PROCEDURE — 25000242 PHARM REV CODE 250 ALT 637 W/ HCPCS: Performed by: NURSE PRACTITIONER

## 2022-04-08 PROCEDURE — 99900031 HC PATIENT EDUCATION (STAT)

## 2022-04-08 PROCEDURE — 97110 THERAPEUTIC EXERCISES: CPT

## 2022-04-08 PROCEDURE — 80048 BASIC METABOLIC PNL TOTAL CA: CPT | Performed by: NURSE PRACTITIONER

## 2022-04-08 PROCEDURE — 25000242 PHARM REV CODE 250 ALT 637 W/ HCPCS: Performed by: INTERNAL MEDICINE

## 2022-04-08 PROCEDURE — 11000001 HC ACUTE MED/SURG PRIVATE ROOM

## 2022-04-08 PROCEDURE — 36415 COLL VENOUS BLD VENIPUNCTURE: CPT | Performed by: NURSE PRACTITIONER

## 2022-04-08 PROCEDURE — 25000003 PHARM REV CODE 250: Performed by: INTERNAL MEDICINE

## 2022-04-08 PROCEDURE — 94761 N-INVAS EAR/PLS OXIMETRY MLT: CPT

## 2022-04-08 PROCEDURE — 99900035 HC TECH TIME PER 15 MIN (STAT)

## 2022-04-08 PROCEDURE — 94640 AIRWAY INHALATION TREATMENT: CPT

## 2022-04-08 PROCEDURE — 63600175 PHARM REV CODE 636 W HCPCS: Performed by: INTERNAL MEDICINE

## 2022-04-08 PROCEDURE — 97530 THERAPEUTIC ACTIVITIES: CPT

## 2022-04-08 RX ORDER — IPRATROPIUM BROMIDE 0.5 MG/2.5ML
0.5 SOLUTION RESPIRATORY (INHALATION)
Status: DISCONTINUED | OUTPATIENT
Start: 2022-04-08 | End: 2022-04-12 | Stop reason: HOSPADM

## 2022-04-08 RX ORDER — LEVALBUTEROL 1.25 MG/.5ML
1.25 SOLUTION, CONCENTRATE RESPIRATORY (INHALATION)
Status: DISCONTINUED | OUTPATIENT
Start: 2022-04-08 | End: 2022-04-12 | Stop reason: HOSPADM

## 2022-04-08 RX ADMIN — POLYETHYLENE GLYCOL (3350) 17 G: 17 POWDER, FOR SOLUTION ORAL at 09:04

## 2022-04-08 RX ADMIN — ASPIRIN 81 MG: 81 TABLET, COATED ORAL at 09:04

## 2022-04-08 RX ADMIN — AMLODIPINE BESYLATE 5 MG: 5 TABLET ORAL at 08:04

## 2022-04-08 RX ADMIN — IPRATROPIUM BROMIDE AND ALBUTEROL SULFATE 3 ML: 2.5; .5 SOLUTION RESPIRATORY (INHALATION) at 07:04

## 2022-04-08 RX ADMIN — ENOXAPARIN SODIUM 30 MG: 30 INJECTION SUBCUTANEOUS at 04:04

## 2022-04-08 RX ADMIN — IPRATROPIUM BROMIDE 0.5 MG: 0.5 SOLUTION RESPIRATORY (INHALATION) at 07:04

## 2022-04-08 RX ADMIN — MIRTAZAPINE 7.5 MG: 7.5 TABLET ORAL at 08:04

## 2022-04-08 RX ADMIN — THERA TABS 1 TABLET: TAB at 09:04

## 2022-04-08 RX ADMIN — IPRATROPIUM BROMIDE AND ALBUTEROL SULFATE 3 ML: 2.5; .5 SOLUTION RESPIRATORY (INHALATION) at 01:04

## 2022-04-08 RX ADMIN — Medication 6 MG: at 08:04

## 2022-04-08 RX ADMIN — TAMSULOSIN HYDROCHLORIDE 0.4 MG: 0.4 CAPSULE ORAL at 09:04

## 2022-04-08 RX ADMIN — Medication 1000 UNITS: at 09:04

## 2022-04-08 RX ADMIN — TAMSULOSIN HYDROCHLORIDE 0.4 MG: 0.4 CAPSULE ORAL at 08:04

## 2022-04-08 RX ADMIN — FENOFIBRATE 145 MG: 145 TABLET, FILM COATED ORAL at 08:04

## 2022-04-08 RX ADMIN — GUAIFENESIN AND DEXTROMETHORPHAN 10 ML: 100; 10 SYRUP ORAL at 08:04

## 2022-04-08 RX ADMIN — GUAIFENESIN AND DEXTROMETHORPHAN 10 ML: 100; 10 SYRUP ORAL at 10:04

## 2022-04-08 RX ADMIN — LEVALBUTEROL 1.25 MG: 1.25 SOLUTION, CONCENTRATE RESPIRATORY (INHALATION) at 07:04

## 2022-04-08 NOTE — PT/OT/SLP PROGRESS
Physical Therapy         Treatment        Maurice Roy   MRN: [id number]     PT Received On: 04/08/22  Total Time (min): 90        Billable Minutes:  Gait [id number], Therapeutic Activity 30 and Therapeutic Exercise 30  Total Minutes: 90  AM Start Time:   AM End Time:   PM Start Time: 1345  PM End Time: 1515  Treatment Type: Individual 45 and Concurrent 45  Treatment Type: Treatment  PT/PTA: PT             General Precautions: Standard,    Orthopedic Precautions:     Braces:           Subjective:  Communicated with Nurse prior to session.         Objective:  Patient found wc, with      Functional Mobility:  Bed Mobility:   Supine to sit: Supervision or Set-up Assistance   Sit to supine: Supervision or Set-up Assistance   Rolling: Standby Assistance   Scooting: Supervision or Set-up Assistance    Balance:   Static Sit: GOOD: Takes MODERATE challenges from all directions  Dynamic Sit:  GOOD: Maintains balance through MODERATE excursions of active trunk movement  Static Stand: FAIR+: Takes MINIMAL challenges from all directions  Dynamic stand: FAIR+: Needs CLOSE SUPERVISION during gait and is able to right self with minor LOB    Transfer Training:  Sit to stand:Stand-by Assistance with Rolling Walker    Bed <> Chair:  Step Transfer with Stand-by Assistance with Rolling Walker      Wheelchair Training:  sba with wc mobility 150ft    Gait Training:  Ambulated 2 times for 150ft with rw sba    Stair Training:  No steps today      Additional Treatment:  BLE exs 4 sets 20 reps with 3lb ankle wts followed by nustep x 15 min at level 3    Activity Tolerance:  Patient tolerated treatment well    Patient left supine with call button in reach.    Assessment:  Maurice Roy is a 89 y.o. male with a medical diagnosis of Spinal stenosis of lumbar region with neurogenic claudication. He presents with pt continuing to be sba with all activities with safety cues.    Rehab potential is good.    Activity tolerance:  Good    Discharge recommendations: Discharge Facility/Level of Care Needs: other (see comments) (to be determined later)     Equipment recommendations:       GOALS:   STGs  1. Pt will be sba with bed mobility- met  2. Pt will be sba with transfers with rw-met  3 pt will be sba with rw 100ft-met  4. Pt sba with wc mobility-met  5. Pt will go up and down 4 steps with yanet -met  LTGs  1. Pt will be ind with bed mobility-not met  2. Pt will be ind with transfers-not met  3. Pt will amb supervision for 150 ft   4. Pt will be ind with wc mobility-not met  5. Pt will go up and down 12 steps with sba-not met    PLAN:    Patient to be seen 5 x/week  to address the above listed problems via gait training, therapeutic activities, wheelchair management/training, therapeutic exercises, therapeutic groups, neuromuscular re-education  Plan of Care expires: 04/11/22  Plan of Care reviewed with: patient         4/8/2022

## 2022-04-08 NOTE — SUBJECTIVE & OBJECTIVE
Interval History: Pt seen and evaluated    Review of Systems   Constitutional:  Positive for activity change, appetite change and fatigue. Negative for chills and fever.   HENT:  Negative for drooling, ear pain, mouth sores, nosebleeds and sore throat.    Eyes:  Negative for discharge, itching and visual disturbance.   Respiratory:  Positive for cough. Negative for apnea, chest tightness, shortness of breath and wheezing.    Cardiovascular:  Negative for chest pain and palpitations.   Gastrointestinal:  Positive for constipation. Negative for abdominal distention, abdominal pain, blood in stool, diarrhea, nausea and vomiting.   Endocrine: Positive for cold intolerance. Negative for heat intolerance and polyphagia.   Genitourinary:  Positive for difficulty urinating. Negative for dysuria, flank pain and frequency.   Musculoskeletal:  Positive for arthralgias and back pain. Negative for myalgias.   Skin:  Negative for rash.   Neurological:  Positive for tremors and weakness. Negative for dizziness, seizures, syncope, facial asymmetry, speech difficulty and headaches.   Hematological:  Negative for adenopathy.   Psychiatric/Behavioral:  Negative for agitation, confusion and hallucinations. The patient is not nervous/anxious.    Objective:     Vital Signs (Most Recent):  Temp: 98.2 °F (36.8 °C) (04/08/22 0645)  Pulse: (!) 116 (04/08/22 0757)  Resp: 20 (04/08/22 0757)  BP: (!) 141/65 (04/08/22 0645)  SpO2: 97 % (04/08/22 0757)   Vital Signs (24h Range):  Temp:  [97.9 °F (36.6 °C)-98.7 °F (37.1 °C)] 98.2 °F (36.8 °C)  Pulse:  [] 116  Resp:  [18-20] 20  SpO2:  [97 %-100 %] 97 %  BP: (141-189)/(64-72) 141/65     Weight: 76.7 kg (169 lb)  Body mass index is 27.28 kg/m².    Intake/Output Summary (Last 24 hours) at 4/8/2022 0991  Last data filed at 4/8/2022 0900  Gross per 24 hour   Intake 1560 ml   Output 1600 ml   Net -40 ml        Physical Exam  Constitutional:       General: He is awake. He is not in acute  distress.     Appearance: Normal appearance. He is not ill-appearing, toxic-appearing or diaphoretic.   HENT:      Head: Normocephalic and atraumatic.      Nose: Nose normal. No congestion or rhinorrhea.      Mouth/Throat:      Mouth: Mucous membranes are moist.      Pharynx: Oropharynx is clear. No oropharyngeal exudate or posterior oropharyngeal erythema.   Eyes:      General: No scleral icterus.        Right eye: No discharge.         Left eye: No discharge.      Extraocular Movements: Extraocular movements intact.      Pupils: Pupils are equal, round, and reactive to light.   Neck:      Thyroid: No thyroid mass or thyromegaly.      Vascular: No carotid bruit.      Meningeal: Brudzinski's sign and Kernig's sign absent.   Cardiovascular:      Rate and Rhythm: Normal rate and regular rhythm.      Chest Wall: PMI is not displaced. No thrill.      Pulses: Normal pulses.      Heart sounds: Normal heart sounds. No murmur heard.    No friction rub. No gallop.   Pulmonary:      Effort: Pulmonary effort is normal. No tachypnea, accessory muscle usage, prolonged expiration or respiratory distress.      Breath sounds: Normal breath sounds. No stridor or decreased air movement. No wheezing, rhonchi or rales.   Chest:      Chest wall: No tenderness.   Abdominal:      General: Bowel sounds are normal. There is no distension.      Palpations: Abdomen is soft. There is no hepatomegaly, splenomegaly or mass.      Tenderness: There is no abdominal tenderness. There is no right CVA tenderness, left CVA tenderness, guarding or rebound.      Hernia: No hernia is present.   Musculoskeletal:         General: No swelling, tenderness, deformity or signs of injury.      Cervical back: Normal range of motion and neck supple. No rigidity. No muscular tenderness.   Lymphadenopathy:      Cervical: No cervical adenopathy.   Skin:     General: Skin is warm.      Capillary Refill: Capillary refill takes less than 2 seconds.      Coloration: Skin  is not cyanotic, jaundiced or pale.      Findings: Bruising, erythema and lesion present. No petechiae or rash.   Neurological:      Mental Status: He is alert and oriented to person, place, and time.      Cranial Nerves: No cranial nerve deficit, dysarthria or facial asymmetry.      Motor: Weakness present. No tremor.      Gait: Gait abnormal.   Psychiatric:         Mood and Affect: Mood normal. Mood is not anxious or depressed. Affect is not flat.         Speech: Speech is not rapid and pressured or slurred.         Behavior: Behavior normal. Behavior is not agitated, aggressive or combative.         Thought Content: Thought content normal. Thought content is not paranoid or delusional.         Cognition and Memory: Cognition is not impaired. Memory is not impaired.         Judgment: Judgment normal.       Significant Labs: All pertinent labs within the past 24 hours have been reviewed.  Recent Lab Results         04/08/22  0557        Anion Gap 5       BUN 61       Calcium 8.6       Chloride 105       CO2 28       Creatinine 2.1       eGFR if African American 30.7       eGFR if non  26.5  Comment: Calculation used to obtain the estimated glomerular filtration  rate (eGFR) is the CKD-EPI equation.          Glucose 96       Potassium 5.0       Sodium 138               Significant Imaging: I have reviewed all pertinent imaging results/findings within the past 24 hours.

## 2022-04-08 NOTE — PROGRESS NOTES
[facility] Medicine  Progress Note    Patient Name: Maurice Roy  MRN: [id number]  Patient Class: IP- Rehab   Admission Date: 3/28/2022  Length of Stay: 11 days  Attending Physician: Freeman Kathleen III, MD  Primary Care Provider: Marleni Castillo MD        Subjective:     Principal Problem:Spinal stenosis of lumbar region with neurogenic claudication        HPI:  Patient is a 89-year-old male with a history of generalized arthritis prostate cancer hypertension and lower back pain.  The patient has a history of spinal stenosis and chronic lower back related issues.  At an outside facility the patient underwent a L4-L5 laminectomy and a foraminotomy at L4-L5 and S5-L5 S1.  Postoperatively the patient had pain in the back and tenderness at the surgical site.  His lower extremity weakness and neuropathic type symptoms improved after surgery.  Patient was seen by primary care after surgery and he was placed on antibiotics.  He was also started on antiplatelets and anti-platelet agents postoperatively.  Patient did have an episode of urinary tension postoperatively requiring a Nixon catheter placement urologic consultation and initiation of BPH medications.  Postop the patient also had worsening tremors was seen by Neurology had an abnormality on EKG and was seen by Cardiology and had several medication changes.  Patient also has had several electrolyte abnormalities requiring treatment.  Slowly the patient began to stabilize and he began therapy and he was referred to our inpatient rehab unit for close monitoring routine blood work close physician oversight and rehabilitative care.  I evaluated the patient this morning on the exercise bike he is very eager and motivated to complete therapy and return home.  The patient was still functioning at a modified independent functional state and is hoping to get back to goal of return home.  The patient has multiple skin tear is requiring wound  treatment as well as a hematoma to the right forearm.  Patient is also having dyspnea with speaking and moderate exertion.  Patient's chart has been reviewed feel is an excellent candidate for our unit.        Overview/Hospital Course:  3/30 SD: Pt laying in bed. Localized hematoma  to right forearm - treating with pressure dressing. Denies any complaints. Continue IPR efforts.    3/31 SD: Pt dressing, sitting in wheelchair in room. Wearing back brace. Positive attitude. Bladder training complete. Remove chavarria catheter today.    4/1 SD: Pt sitting on side of bed getting dressed with assistance, then transitions to wheelchair for therapy. Pt had urinary retention of 300ml and inability to urinate after removal of chavarria catheter. A new chavarria catheter was instilled.    4/2 WC:  stable without acute issues.    4/4/22 LFC no acute issues    4/6 SD: Sitting in wheelchair in common area. C/O constipation.    4/7 SD: C/O cough productive of green sputum. Denies fever or chills.     4/8 SD: Pt had bowel movement and feels much better. His cause is improved with neb treatments and PRN Robitussin.      Interval History: Pt seen and evaluated    Review of Systems   Constitutional:  Positive for activity change, appetite change and fatigue. Negative for chills and fever.   HENT:  Negative for drooling, ear pain, mouth sores, nosebleeds and sore throat.    Eyes:  Negative for discharge, itching and visual disturbance.   Respiratory:  Positive for cough. Negative for apnea, chest tightness, shortness of breath and wheezing.    Cardiovascular:  Negative for chest pain and palpitations.   Gastrointestinal:  Positive for constipation. Negative for abdominal distention, abdominal pain, blood in stool, diarrhea, nausea and vomiting.   Endocrine: Positive for cold intolerance. Negative for heat intolerance and polyphagia.   Genitourinary:  Positive for difficulty urinating. Negative for dysuria, flank pain and frequency.    Musculoskeletal:  Positive for arthralgias and back pain. Negative for myalgias.   Skin:  Negative for rash.   Neurological:  Positive for tremors and weakness. Negative for dizziness, seizures, syncope, facial asymmetry, speech difficulty and headaches.   Hematological:  Negative for adenopathy.   Psychiatric/Behavioral:  Negative for agitation, confusion and hallucinations. The patient is not nervous/anxious.    Objective:     Vital Signs (Most Recent):  Temp: 98.2 °F (36.8 °C) (04/08/22 0645)  Pulse: (!) 116 (04/08/22 0757)  Resp: 20 (04/08/22 0757)  BP: (!) 141/65 (04/08/22 0645)  SpO2: 97 % (04/08/22 0757)   Vital Signs (24h Range):  Temp:  [97.9 °F (36.6 °C)-98.7 °F (37.1 °C)] 98.2 °F (36.8 °C)  Pulse:  [] 116  Resp:  [18-20] 20  SpO2:  [97 %-100 %] 97 %  BP: (141-189)/(64-72) 141/65     Weight: 76.7 kg (169 lb)  Body mass index is 27.28 kg/m².    Intake/Output Summary (Last 24 hours) at 4/8/2022 0947  Last data filed at 4/8/2022 0900  Gross per 24 hour   Intake 1560 ml   Output 1600 ml   Net -40 ml        Physical Exam  Constitutional:       General: He is awake. He is not in acute distress.     Appearance: Normal appearance. He is not ill-appearing, toxic-appearing or diaphoretic.   HENT:      Head: Normocephalic and atraumatic.      Nose: Nose normal. No congestion or rhinorrhea.      Mouth/Throat:      Mouth: Mucous membranes are moist.      Pharynx: Oropharynx is clear. No oropharyngeal exudate or posterior oropharyngeal erythema.   Eyes:      General: No scleral icterus.        Right eye: No discharge.         Left eye: No discharge.      Extraocular Movements: Extraocular movements intact.      Pupils: Pupils are equal, round, and reactive to light.   Neck:      Thyroid: No thyroid mass or thyromegaly.      Vascular: No carotid bruit.      Meningeal: Brudzinski's sign and Kernig's sign absent.   Cardiovascular:      Rate and Rhythm: Normal rate and regular rhythm.      Chest Wall: PMI is not  displaced. No thrill.      Pulses: Normal pulses.      Heart sounds: Normal heart sounds. No murmur heard.    No friction rub. No gallop.   Pulmonary:      Effort: Pulmonary effort is normal. No tachypnea, accessory muscle usage, prolonged expiration or respiratory distress.      Breath sounds: Normal breath sounds. No stridor or decreased air movement. No wheezing, rhonchi or rales.   Chest:      Chest wall: No tenderness.   Abdominal:      General: Bowel sounds are normal. There is no distension.      Palpations: Abdomen is soft. There is no hepatomegaly, splenomegaly or mass.      Tenderness: There is no abdominal tenderness. There is no right CVA tenderness, left CVA tenderness, guarding or rebound.      Hernia: No hernia is present.   Musculoskeletal:         General: No swelling, tenderness, deformity or signs of injury.      Cervical back: Normal range of motion and neck supple. No rigidity. No muscular tenderness.   Lymphadenopathy:      Cervical: No cervical adenopathy.   Skin:     General: Skin is warm.      Capillary Refill: Capillary refill takes less than 2 seconds.      Coloration: Skin is not cyanotic, jaundiced or pale.      Findings: Bruising, erythema and lesion present. No petechiae or rash.   Neurological:      Mental Status: He is alert and oriented to person, place, and time.      Cranial Nerves: No cranial nerve deficit, dysarthria or facial asymmetry.      Motor: Weakness present. No tremor.      Gait: Gait abnormal.   Psychiatric:         Mood and Affect: Mood normal. Mood is not anxious or depressed. Affect is not flat.         Speech: Speech is not rapid and pressured or slurred.         Behavior: Behavior normal. Behavior is not agitated, aggressive or combative.         Thought Content: Thought content normal. Thought content is not paranoid or delusional.         Cognition and Memory: Cognition is not impaired. Memory is not impaired.         Judgment: Judgment normal.       Significant  Labs: All pertinent labs within the past 24 hours have been reviewed.  Recent Lab Results         04/08/22  0557        Anion Gap 5       BUN 61       Calcium 8.6       Chloride 105       CO2 28       Creatinine 2.1       eGFR if African American 30.7       eGFR if non  26.5  Comment: Calculation used to obtain the estimated glomerular filtration  rate (eGFR) is the CKD-EPI equation.          Glucose 96       Potassium 5.0       Sodium 138               Significant Imaging: I have reviewed all pertinent imaging results/findings within the past 24 hours.      Assessment/Plan:      * Spinal stenosis of lumbar region with neurogenic claudication  Continue inpatient rehab efforts      Hematoma  Right forearm  Improved      BPH with obstruction/lower urinary tract symptoms  Attempted removal of chavarria catheter 3/31. Resulted in urinary retention of 300ml and inability to urinate. New chavarria catheter instilled.    Hyperkalemia  Fluctuating, monitor regular labs    Hyponatremia  Improved. Monitor regular labs.      Acute renal failure  Monitor regular labs and urine output      Wenckebach's phenomenon, heart block  Stable      Hypertension  Continue home meds. Monitor blood pressure and adjust meds PRN.        VTE Risk Mitigation (From admission, onward)         Ordered     enoxaparin injection 30 mg  Daily         03/28/22 1444     IP VTE HIGH RISK PATIENT  Once         03/28/22 1432     Place sequential compression device  Until discontinued         03/28/22 1432                Discharge Planning   MARCOS:      Code Status: Full Code   Is the patient medically ready for discharge?:     Reason for patient still in hospital (select all that apply): Patient trending condition, Laboratory test, Treatment and PT / OT recommendations  Discharge Plan A: Home Health                  Madiha Brown NP  Department of Hospital Medicine   [facility] ([facility])

## 2022-04-08 NOTE — PLAN OF CARE
Problem: Impaired Wound Healing  Goal: Optimal Wound Healing  Outcome: Ongoing, Progressing     Problem: Infection  Goal: Absence of Infection Signs and Symptoms  Outcome: Ongoing, Progressing     Problem: Fall Injury Risk  Goal: Absence of Fall and Fall-Related Injury  Outcome: Ongoing, Progressing

## 2022-04-08 NOTE — PT/OT/SLP PROGRESS
Occupational Therapy  Treatment    Maurice Roy   MRN: [id number]   Admitting Diagnosis: Spinal stenosis of lumbar region with neurogenic claudication    OT Date of Treatment: 04/08/22   AM Start Time: 1000  AM End Time: 1200  PM Start Time: na  PM End Time: na  Treatment Type: Individual 60 and Concurrent 60  Total Time (min): 90 min      Billable Minutes:  Therapeutic Activity 60 and Therapeutic Exercise 60  Total Minutes: 120    General Precautions: Standard,    Orthopedic Precautions: spinal precautions  Braces:      Spiritual, Cultural Beliefs, Congregational Practices, Values that Affect Care: no    Subjective:  Communicated with nurse prior to session.    Pain/Comfort  Pain Rating 1: 5/10  Location - Orientation 1: lower  Location 1: back  Pain Addressed 1: Reposition, Nurse notified, Cessation of Activity  Pain Rating Post-Intervention 1: 5/10    Objective:  Patient found with: chavarria catheter. Pt was cooperative and motivated without verbal encouragement while exhibiting positive affect. He participated in functional transfer retraining throughout session to / from wheelchair, toilet, and chair emphasizing fall prevention providing extra time with repetition requiring SBA secondary to safety concerns with min verbal and tactile cueing for safety awareness and technique utilizing RW. Pt then participated in therapeutic exercise performing 5x15 seated pushups requiring min verbal and tactile cueing for technique challenging him to lift buttocks off seated surface to end range elbow extension in order to strengthen triceps brachii to improve performance with sit <> stand transitions. Next, he participated in therapeutic activity addressing functional reaching, static / dynamic standing balance, standing tolerance, and energy for task / endurance challenging him to reach in multiple planes utilizing bilateral UE's to retrieve, stabilize, manipulate, and place various light items needed to perform functional  tasks of ADL's requiring mod verbal and tactile cueing to facilitate optimal movement patterns and posture while sustaining compliance with Spinal Precautions utilizing forward, lateral, and diagonal steps with intermittent steadying assist when challenged outside MALIK. Pt then participated in therapeutic exercise performing 3x15 bilateral UE proximal strengthening exercises with scapular retraction, shoulder extension, shoulder adduction, horizontal abduction, shoulder flexion in plane of scaption, internal rotation, and external rotation while seated in chair unsupported requiring min-mod verbal and tactile cueing for technique while sustaining Spinal Precautions.    Functional Mobility:  Bed Mobility:   Supine to sit: Supervision or Set-up Assistance   Sit to supine: Supervision or Set-up Assistance   Rolling: Supervision or Set-up Assistance   Scooting: Supervision or Set-up Assistance    Transfer Training:   Sit to stand:Stand-by Assistance with Rolling Walker .  Bed <> Chair:  Step Transfer with Stand-by Assistance with Rolling Walker .  Toilet Transfer:  Pt Step Transfer with Stand-by Assistance with Grab bars .      Balance:   Static Sit: GOOD: Takes MODERATE challenges from all directions  Dynamic Sit:  GOOD-: Incosistently Maintains balance through MODERATE excursions of active trunk movement,     Static Stand: FAIR+: Takes MINIMAL challenges from all directions  Dynamic stand: FAIR+: Needs CLOSE SUPERVISION during gait and is able to right self with minor LOB      Patient left up in chair with nurse notified    ASSESSMENT:  Pt demonstrated improved functional performance with transfers as noted by SBA on this date in which he did not require steadying assist or tactile cueing, but mostly verbal cueing for safety awareness and technique utilizing RW or grab bar.    Rehab potential is good    Activity tolerance: Good    Discharge recommendations: other (see comments) (To be further determined based on  progress prior to discharge.)     Equipment recommendations: other (see comments) (To be further determined based on progress prior to discharge.)     GOALS:   Multidisciplinary Problems     Occupational Therapy Goals        Problem: Occupational Therapy    Goal Priority Disciplines Outcome Interventions   Occupational Therapy Goal     OT, PT/OT     Description: Long Term Goals to be met by: 04/11/22     Patient will increase functional independence with ADLs by performing:    Feeding with [place].  UE Dressing with Modified [place].  LE Dressing with Modified [place].  Grooming while seated at sink with Modified [place].  Toileting from toilet with Modified [place] for hygiene and clothing management.   Bathing from  shower chair/bench with Modified [place].  Step transfer with Modified [place]  Toilet transfer to toilet with Modified [place].  Increased functional strength to 4+/5 for bilateral UE's.                     Plan:  Patient to be seen 5 x/week (for 90 min per day for 14 days) to address the above listed problems via self-care/home management, community/work re-entry, therapeutic activities, therapeutic exercises  Plan of Care expires: 04/11/22  Plan of Care reviewed with: patient         04/08/2022

## 2022-04-08 NOTE — NURSING
[id number] Respiratory therapist notified this nurse in regards to patient have elevated heart rate and jittery from albuterol treatment. Sent a secure chat to Dr. Kathleen orders to change to xoponex and ipratropium tid. Notified Amelia with respiratory.

## 2022-04-09 PROBLEM — D64.9 ANEMIA: Status: ACTIVE | Noted: 2022-04-09

## 2022-04-09 LAB
ALBUMIN SERPL BCP-MCNC: 2.4 G/DL (ref 3.5–5.2)
ALP SERPL-CCNC: 81 U/L (ref 55–135)
ALT SERPL W/O P-5'-P-CCNC: 24 U/L (ref 10–44)
ANION GAP SERPL CALC-SCNC: 5 MMOL/L (ref 8–16)
AST SERPL-CCNC: 24 U/L (ref 10–40)
BASOPHILS # BLD AUTO: 0.07 K/UL (ref 0–0.2)
BASOPHILS NFR BLD: 0.7 % (ref 0–1.9)
BILIRUB SERPL-MCNC: 0.4 MG/DL (ref 0.1–1)
BUN SERPL-MCNC: 60 MG/DL (ref 10–30)
CALCIUM SERPL-MCNC: 8.9 MG/DL (ref 8.7–10.5)
CHLORIDE SERPL-SCNC: 105 MMOL/L (ref 95–110)
CO2 SERPL-SCNC: 28 MMOL/L (ref 23–29)
CREAT SERPL-MCNC: 2.1 MG/DL (ref 0.5–1.4)
DIFFERENTIAL METHOD: ABNORMAL
EOSINOPHIL # BLD AUTO: 0.2 K/UL (ref 0–0.5)
EOSINOPHIL NFR BLD: 1.9 % (ref 0–8)
ERYTHROCYTE [DISTWIDTH] IN BLOOD BY AUTOMATED COUNT: 15.6 % (ref 11.5–14.5)
EST. GFR  (AFRICAN AMERICAN): 30.7 ML/MIN/1.73 M^2
EST. GFR  (NON AFRICAN AMERICAN): 26.5 ML/MIN/1.73 M^2
GLUCOSE SERPL-MCNC: 98 MG/DL (ref 70–110)
HCT VFR BLD AUTO: 23.6 % (ref 40–54)
HGB BLD-MCNC: 7.7 G/DL (ref 14–18)
IMM GRANULOCYTES # BLD AUTO: 0.02 K/UL (ref 0–0.04)
IMM GRANULOCYTES NFR BLD AUTO: 0.2 % (ref 0–0.5)
LYMPHOCYTES # BLD AUTO: 2 K/UL (ref 1–4.8)
LYMPHOCYTES NFR BLD: 19.3 % (ref 18–48)
MAGNESIUM SERPL-MCNC: 2.2 MG/DL (ref 1.6–2.6)
MCH RBC QN AUTO: 30.2 PG (ref 27–31)
MCHC RBC AUTO-ENTMCNC: 32.6 G/DL (ref 32–36)
MCV RBC AUTO: 93 FL (ref 82–98)
MONOCYTES # BLD AUTO: 1.4 K/UL (ref 0.3–1)
MONOCYTES NFR BLD: 13.5 % (ref 4–15)
NEUTROPHILS # BLD AUTO: 6.8 K/UL (ref 1.8–7.7)
NEUTROPHILS NFR BLD: 64.4 % (ref 38–73)
NRBC BLD-RTO: 0 /100 WBC
PLATELET # BLD AUTO: 346 K/UL (ref 150–450)
PMV BLD AUTO: 10.1 FL (ref 9.2–12.9)
POTASSIUM SERPL-SCNC: 4.2 MMOL/L (ref 3.5–5.1)
PROT SERPL-MCNC: 5.8 G/DL (ref 6–8.4)
RBC # BLD AUTO: 2.55 M/UL (ref 4.6–6.2)
SODIUM SERPL-SCNC: 138 MMOL/L (ref 136–145)
WBC # BLD AUTO: 10.55 K/UL (ref 3.9–12.7)

## 2022-04-09 PROCEDURE — 94761 N-INVAS EAR/PLS OXIMETRY MLT: CPT

## 2022-04-09 PROCEDURE — 11000001 HC ACUTE MED/SURG PRIVATE ROOM

## 2022-04-09 PROCEDURE — 99900035 HC TECH TIME PER 15 MIN (STAT)

## 2022-04-09 PROCEDURE — 63600175 PHARM REV CODE 636 W HCPCS: Performed by: INTERNAL MEDICINE

## 2022-04-09 PROCEDURE — 94640 AIRWAY INHALATION TREATMENT: CPT

## 2022-04-09 PROCEDURE — 25000242 PHARM REV CODE 250 ALT 637 W/ HCPCS: Performed by: INTERNAL MEDICINE

## 2022-04-09 PROCEDURE — 83735 ASSAY OF MAGNESIUM: CPT | Performed by: NURSE PRACTITIONER

## 2022-04-09 PROCEDURE — 25000003 PHARM REV CODE 250: Performed by: INTERNAL MEDICINE

## 2022-04-09 PROCEDURE — 36415 COLL VENOUS BLD VENIPUNCTURE: CPT | Performed by: NURSE PRACTITIONER

## 2022-04-09 PROCEDURE — 85025 COMPLETE CBC W/AUTO DIFF WBC: CPT | Performed by: NURSE PRACTITIONER

## 2022-04-09 PROCEDURE — 99900031 HC PATIENT EDUCATION (STAT)

## 2022-04-09 PROCEDURE — 80053 COMPREHEN METABOLIC PANEL: CPT | Performed by: NURSE PRACTITIONER

## 2022-04-09 RX ADMIN — GUAIFENESIN AND DEXTROMETHORPHAN 10 ML: 100; 10 SYRUP ORAL at 07:04

## 2022-04-09 RX ADMIN — Medication 1000 UNITS: at 08:04

## 2022-04-09 RX ADMIN — GUAIFENESIN AND DEXTROMETHORPHAN 10 ML: 100; 10 SYRUP ORAL at 04:04

## 2022-04-09 RX ADMIN — IPRATROPIUM BROMIDE 0.5 MG: 0.5 SOLUTION RESPIRATORY (INHALATION) at 07:04

## 2022-04-09 RX ADMIN — ASPIRIN 81 MG: 81 TABLET, COATED ORAL at 08:04

## 2022-04-09 RX ADMIN — TAMSULOSIN HYDROCHLORIDE 0.4 MG: 0.4 CAPSULE ORAL at 08:04

## 2022-04-09 RX ADMIN — HYDROCODONE BITARTRATE AND ACETAMINOPHEN 1 TABLET: 5; 325 TABLET ORAL at 08:04

## 2022-04-09 RX ADMIN — MIRTAZAPINE 7.5 MG: 7.5 TABLET ORAL at 08:04

## 2022-04-09 RX ADMIN — LEVALBUTEROL 1.25 MG: 1.25 SOLUTION, CONCENTRATE RESPIRATORY (INHALATION) at 07:04

## 2022-04-09 RX ADMIN — FENOFIBRATE 145 MG: 145 TABLET, FILM COATED ORAL at 08:04

## 2022-04-09 RX ADMIN — LEVALBUTEROL 1.25 MG: 1.25 SOLUTION, CONCENTRATE RESPIRATORY (INHALATION) at 02:04

## 2022-04-09 RX ADMIN — GUAIFENESIN AND DEXTROMETHORPHAN 10 ML: 100; 10 SYRUP ORAL at 08:04

## 2022-04-09 RX ADMIN — IPRATROPIUM BROMIDE 0.5 MG: 0.5 SOLUTION RESPIRATORY (INHALATION) at 02:04

## 2022-04-09 RX ADMIN — THERA TABS 1 TABLET: TAB at 08:04

## 2022-04-09 RX ADMIN — POLYETHYLENE GLYCOL (3350) 17 G: 17 POWDER, FOR SOLUTION ORAL at 08:04

## 2022-04-09 RX ADMIN — AMLODIPINE BESYLATE 5 MG: 5 TABLET ORAL at 08:04

## 2022-04-09 RX ADMIN — ENOXAPARIN SODIUM 30 MG: 30 INJECTION SUBCUTANEOUS at 04:04

## 2022-04-09 NOTE — PROGRESS NOTES
[facility] Medicine  Progress Note    Patient Name: Maurice Roy  MRN: [id number]  Patient Class: IP- Rehab   Admission Date: 3/28/2022  Length of Stay: 12 days  Attending Physician: Freeman Kathleen III, MD  Primary Care Provider: Marleni Castillo MD        Subjective:     Principal Problem:Spinal stenosis of lumbar region with neurogenic claudication        HPI:  Patient is a 89-year-old male with a history of generalized arthritis prostate cancer hypertension and lower back pain.  The patient has a history of spinal stenosis and chronic lower back related issues.  At an outside facility the patient underwent a L4-L5 laminectomy and a foraminotomy at L4-L5 and S5-L5 S1.  Postoperatively the patient had pain in the back and tenderness at the surgical site.  His lower extremity weakness and neuropathic type symptoms improved after surgery.  Patient was seen by primary care after surgery and he was placed on antibiotics.  He was also started on antiplatelets and anti-platelet agents postoperatively.  Patient did have an episode of urinary tension postoperatively requiring a Nixon catheter placement urologic consultation and initiation of BPH medications.  Postop the patient also had worsening tremors was seen by Neurology had an abnormality on EKG and was seen by Cardiology and had several medication changes.  Patient also has had several electrolyte abnormalities requiring treatment.  Slowly the patient began to stabilize and he began therapy and he was referred to our inpatient rehab unit for close monitoring routine blood work close physician oversight and rehabilitative care.  I evaluated the patient this morning on the exercise bike he is very eager and motivated to complete therapy and return home.  The patient was still functioning at a modified independent functional state and is hoping to get back to goal of return home.  The patient has multiple skin tear is requiring wound  treatment as well as a hematoma to the right forearm.  Patient is also having dyspnea with speaking and moderate exertion.  Patient's chart has been reviewed feel is an excellent candidate for our unit.        Overview/Hospital Course:  3/30 SD: Pt laying in bed. Localized hematoma  to right forearm - treating with pressure dressing. Denies any complaints. Continue IPR efforts.    3/31 SD: Pt dressing, sitting in wheelchair in room. Wearing back brace. Positive attitude. Bladder training complete. Remove chavarria catheter today.    4/1 SD: Pt sitting on side of bed getting dressed with assistance, then transitions to wheelchair for therapy. Pt had urinary retention of 300ml and inability to urinate after removal of chavarria catheter. A new chavarria catheter was instilled.    4/2 WC:  stable without acute issues.    4/4/22 LFC no acute issues    4/6 SD: Sitting in wheelchair in common area. C/O constipation.    4/7 SD: C/O cough productive of green sputum. Denies fever or chills.     4/8 SD: Pt had bowel movement and feels much better. His cause is improved with neb treatments and PRN Robitussin.    4/9 ND reports cough has improved; doing well today      Interval History: Pt seen and evaluated    Review of Systems   Constitutional:  Positive for activity change, appetite change and fatigue. Negative for chills and fever.   HENT:  Negative for drooling, ear pain, mouth sores, nosebleeds and sore throat.    Eyes:  Negative for discharge, itching and visual disturbance.   Respiratory:  Positive for cough. Negative for apnea, chest tightness, shortness of breath and wheezing.    Cardiovascular:  Negative for chest pain and palpitations.   Gastrointestinal:  Positive for constipation. Negative for abdominal distention, abdominal pain, blood in stool, diarrhea, nausea and vomiting.   Endocrine: Positive for cold intolerance. Negative for heat intolerance and polyphagia.   Genitourinary:  Positive for difficulty urinating. Negative  for dysuria, flank pain and frequency.   Musculoskeletal:  Positive for arthralgias and back pain. Negative for myalgias.   Skin:  Negative for rash.   Neurological:  Positive for tremors and weakness. Negative for dizziness, seizures, syncope, facial asymmetry, speech difficulty and headaches.   Hematological:  Negative for adenopathy.   Psychiatric/Behavioral:  Negative for agitation, confusion and hallucinations. The patient is not nervous/anxious.    Objective:     Vital Signs (Most Recent):  Temp: 97.8 °F (36.6 °C) (04/09/22 0509)  Pulse: 110 (04/09/22 0717)  Resp: 20 (04/09/22 0717)  BP: (!) 158/69 (04/09/22 0509)  SpO2: 97 % (04/09/22 0717)   Vital Signs (24h Range):  Temp:  [97.8 °F (36.6 °C)-98 °F (36.7 °C)] 97.8 °F (36.6 °C)  Pulse:  [] 110  Resp:  [18-20] 20  SpO2:  [96 %-98 %] 97 %  BP: (158-184)/(69-77) 158/69     Weight: 76.7 kg (169 lb)  Body mass index is 27.28 kg/m².    Intake/Output Summary (Last 24 hours) at 4/9/2022 1030  Last data filed at 4/9/2022 0800  Gross per 24 hour   Intake 2040 ml   Output 1800 ml   Net 240 ml        Physical Exam  Constitutional:       General: He is awake. He is not in acute distress.     Appearance: Normal appearance. He is not ill-appearing, toxic-appearing or diaphoretic.   HENT:      Head: Normocephalic and atraumatic.      Nose: Nose normal. No congestion or rhinorrhea.      Mouth/Throat:      Mouth: Mucous membranes are moist.      Pharynx: Oropharynx is clear. No oropharyngeal exudate or posterior oropharyngeal erythema.   Eyes:      General: No scleral icterus.        Right eye: No discharge.         Left eye: No discharge.      Extraocular Movements: Extraocular movements intact.      Pupils: Pupils are equal, round, and reactive to light.   Neck:      Thyroid: No thyroid mass or thyromegaly.      Vascular: No carotid bruit.      Meningeal: Brudzinski's sign and Kernig's sign absent.   Cardiovascular:      Rate and Rhythm: Normal rate and regular rhythm.       Chest Wall: PMI is not displaced. No thrill.      Pulses: Normal pulses.      Heart sounds: Normal heart sounds. No murmur heard.    No friction rub. No gallop.   Pulmonary:      Effort: Pulmonary effort is normal. No tachypnea, accessory muscle usage, prolonged expiration or respiratory distress.      Breath sounds: Normal breath sounds. No stridor or decreased air movement. No wheezing, rhonchi or rales.   Chest:      Chest wall: No tenderness.   Abdominal:      General: Bowel sounds are normal. There is no distension.      Palpations: Abdomen is soft. There is no hepatomegaly, splenomegaly or mass.      Tenderness: There is no abdominal tenderness. There is no right CVA tenderness, left CVA tenderness, guarding or rebound.      Hernia: No hernia is present.   Musculoskeletal:         General: No swelling, tenderness, deformity or signs of injury.      Cervical back: Normal range of motion and neck supple. No rigidity. No muscular tenderness.   Lymphadenopathy:      Cervical: No cervical adenopathy.   Skin:     General: Skin is warm.      Capillary Refill: Capillary refill takes less than 2 seconds.      Coloration: Skin is not cyanotic, jaundiced or pale.      Findings: Bruising, erythema and lesion present. No petechiae or rash.   Neurological:      Mental Status: He is alert and oriented to person, place, and time.      Cranial Nerves: No cranial nerve deficit, dysarthria or facial asymmetry.      Motor: Weakness present. No tremor.      Gait: Gait abnormal.   Psychiatric:         Mood and Affect: Mood normal. Mood is not anxious or depressed. Affect is not flat.         Speech: Speech is not rapid and pressured or slurred.         Behavior: Behavior normal. Behavior is not agitated, aggressive or combative.         Thought Content: Thought content normal. Thought content is not paranoid or delusional.         Cognition and Memory: Cognition is not impaired. Memory is not impaired.         Judgment:  Judgment normal.       Significant Labs: All pertinent labs within the past 24 hours have been reviewed.  Recent Lab Results         04/09/22  0514        Albumin 2.4       Alkaline Phosphatase 81       ALT 24       Anion Gap 5       AST 24       Baso # 0.07       Basophil % 0.7       BILIRUBIN TOTAL 0.4  Comment: For infants and newborns, interpretation of results should be based  on gestational age, weight and in agreement with clinical  observations.    Premature Infant recommended reference ranges:  Up to 24 hours.............<8.0 mg/dL  Up to 48 hours............<12.0 mg/dL  3-5 days..................<15.0 mg/dL  6-29 days.................<15.0 mg/dL    For patients on Eltrombopag therapy, use of Dimension [place] TBIL is   not   recommended.         BUN 60       Calcium 8.9       Chloride 105       CO2 28       Creatinine 2.1       Differential Method Automated       eGFR if African American 30.7       eGFR if non  26.5  Comment: Calculation used to obtain the estimated glomerular filtration  rate (eGFR) is the CKD-EPI equation.          Eos # 0.2       Eosinophil % 1.9       Glucose 98       Gran # (ANC) 6.8       Gran % 64.4       Hematocrit 23.6       Hemoglobin 7.7       Immature Grans (Abs) 0.02  Comment: Mild elevation in immature granulocytes is non specific and   can be seen in a variety of conditions including stress response,   acute inflammation, trauma and pregnancy. Correlation with other   laboratory and clinical findings is essential.         Immature Granulocytes 0.2       Lymph # 2.0       Lymph % 19.3       Magnesium 2.2       MCH 30.2       MCHC 32.6       MCV 93       Mono # 1.4       Mono % 13.5       MPV 10.1       nRBC 0       Platelets 346       Potassium 4.2       PROTEIN TOTAL 5.8       RBC 2.55       RDW 15.6       Sodium 138       WBC 10.55               Significant Imaging: I have reviewed all pertinent imaging results/findings within the past 24  hours.      Assessment/Plan:      * Spinal stenosis of lumbar region with neurogenic claudication  Continue inpatient rehab efforts      Anemia  H/h slightly decreased today - recheck h/h in am      Hematoma  Right forearm  Improved      BPH with obstruction/lower urinary tract symptoms  Nixon in place    Hyperkalemia  Fluctuating, monitor regular labs    Hyponatremia  Improved. Monitor regular labs.      Acute renal failure  Monitor regular labs and urine output  Cr stable at 2.1 on 4/9/22    Wenckebach's phenomenon, heart block  Stable      Hypertension  Continue home meds. Monitor blood pressure and adjust meds PRN.        VTE Risk Mitigation (From admission, onward)         Ordered     enoxaparin injection 30 mg  Daily         03/28/22 1444     IP VTE HIGH RISK PATIENT  Once         03/28/22 1432     Place sequential compression device  Until discontinued         03/28/22 1432                Discharge Planning   MARCOS:      Code Status: Full Code   Is the patient medically ready for discharge?:     Reason for patient still in hospital (select all that apply): Treatment  Discharge Plan A: Home Health                  Scarlett Blum MD  Department of Hospital Medicine   [facility] ([facility])

## 2022-04-09 NOTE — PLAN OF CARE
Problem: Adult Inpatient Plan of Care  Goal: Plan of Care Review  Outcome: Ongoing, Progressing  Goal: Patient-Specific Goal (Individualized)  Outcome: Ongoing, Progressing  Goal: Absence of Hospital-Acquired Illness or Injury  Outcome: Ongoing, Progressing  Goal: Optimal Comfort and Wellbeing  Outcome: Ongoing, Progressing  Goal: Readiness for Transition of Care  Outcome: Ongoing, Progressing     Problem: Fluid and Electrolyte Imbalance (Acute Kidney Injury/Impairment)  Goal: Fluid and Electrolyte Balance  Outcome: Ongoing, Progressing     Problem: Oral Intake Inadequate (Acute Kidney Injury/Impairment)  Goal: Optimal Nutrition Intake  Outcome: Ongoing, Progressing     Problem: Renal Function Impairment (Acute Kidney Injury/Impairment)  Goal: Effective Renal Function  Outcome: Ongoing, Progressing     Problem: Impaired Wound Healing  Goal: Optimal Wound Healing  Outcome: Ongoing, Progressing     Problem: Infection  Goal: Absence of Infection Signs and Symptoms  Outcome: Ongoing, Progressing     Problem: Skin Injury Risk Increased  Goal: Skin Health and Integrity  Outcome: Ongoing, Progressing     Problem: Fall Injury Risk  Goal: Absence of Fall and Fall-Related Injury  Outcome: Ongoing, Progressing   Will continue to monitor and will maintain a safe environment

## 2022-04-09 NOTE — SUBJECTIVE & OBJECTIVE
Interval History: Pt seen and evaluated    Review of Systems   Constitutional:  Positive for activity change, appetite change and fatigue. Negative for chills and fever.   HENT:  Negative for drooling, ear pain, mouth sores, nosebleeds and sore throat.    Eyes:  Negative for discharge, itching and visual disturbance.   Respiratory:  Positive for cough. Negative for apnea, chest tightness, shortness of breath and wheezing.    Cardiovascular:  Negative for chest pain and palpitations.   Gastrointestinal:  Positive for constipation. Negative for abdominal distention, abdominal pain, blood in stool, diarrhea, nausea and vomiting.   Endocrine: Positive for cold intolerance. Negative for heat intolerance and polyphagia.   Genitourinary:  Positive for difficulty urinating. Negative for dysuria, flank pain and frequency.   Musculoskeletal:  Positive for arthralgias and back pain. Negative for myalgias.   Skin:  Negative for rash.   Neurological:  Positive for tremors and weakness. Negative for dizziness, seizures, syncope, facial asymmetry, speech difficulty and headaches.   Hematological:  Negative for adenopathy.   Psychiatric/Behavioral:  Negative for agitation, confusion and hallucinations. The patient is not nervous/anxious.    Objective:     Vital Signs (Most Recent):  Temp: 97.8 °F (36.6 °C) (04/09/22 0509)  Pulse: 110 (04/09/22 0717)  Resp: 20 (04/09/22 0717)  BP: (!) 158/69 (04/09/22 0509)  SpO2: 97 % (04/09/22 0717)   Vital Signs (24h Range):  Temp:  [97.8 °F (36.6 °C)-98 °F (36.7 °C)] 97.8 °F (36.6 °C)  Pulse:  [] 110  Resp:  [18-20] 20  SpO2:  [96 %-98 %] 97 %  BP: (158-184)/(69-77) 158/69     Weight: 76.7 kg (169 lb)  Body mass index is 27.28 kg/m².    Intake/Output Summary (Last 24 hours) at 4/9/2022 1030  Last data filed at 4/9/2022 0800  Gross per 24 hour   Intake 2040 ml   Output 1800 ml   Net 240 ml        Physical Exam  Constitutional:       General: He is awake. He is not in acute distress.      Appearance: Normal appearance. He is not ill-appearing, toxic-appearing or diaphoretic.   HENT:      Head: Normocephalic and atraumatic.      Nose: Nose normal. No congestion or rhinorrhea.      Mouth/Throat:      Mouth: Mucous membranes are moist.      Pharynx: Oropharynx is clear. No oropharyngeal exudate or posterior oropharyngeal erythema.   Eyes:      General: No scleral icterus.        Right eye: No discharge.         Left eye: No discharge.      Extraocular Movements: Extraocular movements intact.      Pupils: Pupils are equal, round, and reactive to light.   Neck:      Thyroid: No thyroid mass or thyromegaly.      Vascular: No carotid bruit.      Meningeal: Brudzinski's sign and Kernig's sign absent.   Cardiovascular:      Rate and Rhythm: Normal rate and regular rhythm.      Chest Wall: PMI is not displaced. No thrill.      Pulses: Normal pulses.      Heart sounds: Normal heart sounds. No murmur heard.    No friction rub. No gallop.   Pulmonary:      Effort: Pulmonary effort is normal. No tachypnea, accessory muscle usage, prolonged expiration or respiratory distress.      Breath sounds: Normal breath sounds. No stridor or decreased air movement. No wheezing, rhonchi or rales.   Chest:      Chest wall: No tenderness.   Abdominal:      General: Bowel sounds are normal. There is no distension.      Palpations: Abdomen is soft. There is no hepatomegaly, splenomegaly or mass.      Tenderness: There is no abdominal tenderness. There is no right CVA tenderness, left CVA tenderness, guarding or rebound.      Hernia: No hernia is present.   Musculoskeletal:         General: No swelling, tenderness, deformity or signs of injury.      Cervical back: Normal range of motion and neck supple. No rigidity. No muscular tenderness.   Lymphadenopathy:      Cervical: No cervical adenopathy.   Skin:     General: Skin is warm.      Capillary Refill: Capillary refill takes less than 2 seconds.      Coloration: Skin is not  cyanotic, jaundiced or pale.      Findings: Bruising, erythema and lesion present. No petechiae or rash.   Neurological:      Mental Status: He is alert and oriented to person, place, and time.      Cranial Nerves: No cranial nerve deficit, dysarthria or facial asymmetry.      Motor: Weakness present. No tremor.      Gait: Gait abnormal.   Psychiatric:         Mood and Affect: Mood normal. Mood is not anxious or depressed. Affect is not flat.         Speech: Speech is not rapid and pressured or slurred.         Behavior: Behavior normal. Behavior is not agitated, aggressive or combative.         Thought Content: Thought content normal. Thought content is not paranoid or delusional.         Cognition and Memory: Cognition is not impaired. Memory is not impaired.         Judgment: Judgment normal.       Significant Labs: All pertinent labs within the past 24 hours have been reviewed.  Recent Lab Results         04/09/22  0514        Albumin 2.4       Alkaline Phosphatase 81       ALT 24       Anion Gap 5       AST 24       Baso # 0.07       Basophil % 0.7       BILIRUBIN TOTAL 0.4  Comment: For infants and newborns, interpretation of results should be based  on gestational age, weight and in agreement with clinical  observations.    Premature Infant recommended reference ranges:  Up to 24 hours.............<8.0 mg/dL  Up to 48 hours............<12.0 mg/dL  3-5 days..................<15.0 mg/dL  6-29 days.................<15.0 mg/dL    For patients on Eltrombopag therapy, use of Dimension [place] TBIL is   not   recommended.         BUN 60       Calcium 8.9       Chloride 105       CO2 28       Creatinine 2.1       Differential Method Automated       eGFR if African American 30.7       eGFR if non  26.5  Comment: Calculation used to obtain the estimated glomerular filtration  rate (eGFR) is the CKD-EPI equation.          Eos # 0.2       Eosinophil % 1.9       Glucose 98       Gran # (ANC) 6.8       Gran %  64.4       Hematocrit 23.6       Hemoglobin 7.7       Immature Grans (Abs) 0.02  Comment: Mild elevation in immature granulocytes is non specific and   can be seen in a variety of conditions including stress response,   acute inflammation, trauma and pregnancy. Correlation with other   laboratory and clinical findings is essential.         Immature Granulocytes 0.2       Lymph # 2.0       Lymph % 19.3       Magnesium 2.2       MCH 30.2       MCHC 32.6       MCV 93       Mono # 1.4       Mono % 13.5       MPV 10.1       nRBC 0       Platelets 346       Potassium 4.2       PROTEIN TOTAL 5.8       RBC 2.55       RDW 15.6       Sodium 138       WBC 10.55               Significant Imaging: I have reviewed all pertinent imaging results/findings within the past 24 hours.

## 2022-04-10 LAB
ABO + RH BLD: NORMAL
BASOPHILS # BLD AUTO: 0.07 K/UL (ref 0–0.2)
BASOPHILS NFR BLD: 0.7 % (ref 0–1.9)
BLD GP AB SCN CELLS X3 SERPL QL: NORMAL
BLD PROD TYP BPU: NORMAL
BLD PROD TYP BPU: NORMAL
BLOOD UNIT EXPIRATION DATE: NORMAL
BLOOD UNIT EXPIRATION DATE: NORMAL
BLOOD UNIT TYPE CODE: 5100
BLOOD UNIT TYPE CODE: 5100
BLOOD UNIT TYPE: NORMAL
BLOOD UNIT TYPE: NORMAL
CODING SYSTEM: NORMAL
CODING SYSTEM: NORMAL
DIFFERENTIAL METHOD: ABNORMAL
DISPENSE STATUS: NORMAL
DISPENSE STATUS: NORMAL
EOSINOPHIL # BLD AUTO: 0.4 K/UL (ref 0–0.5)
EOSINOPHIL NFR BLD: 3.5 % (ref 0–8)
ERYTHROCYTE [DISTWIDTH] IN BLOOD BY AUTOMATED COUNT: 15.4 % (ref 11.5–14.5)
HCT VFR BLD AUTO: 22.7 % (ref 40–54)
HGB BLD-MCNC: 7.3 G/DL (ref 14–18)
IMM GRANULOCYTES # BLD AUTO: 0.03 K/UL (ref 0–0.04)
IMM GRANULOCYTES NFR BLD AUTO: 0.3 % (ref 0–0.5)
LYMPHOCYTES # BLD AUTO: 1.8 K/UL (ref 1–4.8)
LYMPHOCYTES NFR BLD: 17.8 % (ref 18–48)
MCH RBC QN AUTO: 30.3 PG (ref 27–31)
MCHC RBC AUTO-ENTMCNC: 32.2 G/DL (ref 32–36)
MCV RBC AUTO: 94 FL (ref 82–98)
MONOCYTES # BLD AUTO: 1.4 K/UL (ref 0.3–1)
MONOCYTES NFR BLD: 13.5 % (ref 4–15)
NEUTROPHILS # BLD AUTO: 6.6 K/UL (ref 1.8–7.7)
NEUTROPHILS NFR BLD: 64.2 % (ref 38–73)
NRBC BLD-RTO: 0 /100 WBC
NUM UNITS TRANS PACKED RBC: NORMAL
NUM UNITS TRANS PACKED RBC: NORMAL
PLATELET # BLD AUTO: 328 K/UL (ref 150–450)
PMV BLD AUTO: 10.6 FL (ref 9.2–12.9)
RBC # BLD AUTO: 2.41 M/UL (ref 4.6–6.2)
WBC # BLD AUTO: 10.27 K/UL (ref 3.9–12.7)

## 2022-04-10 PROCEDURE — 63600175 PHARM REV CODE 636 W HCPCS: Performed by: INTERNAL MEDICINE

## 2022-04-10 PROCEDURE — 25000003 PHARM REV CODE 250: Performed by: INTERNAL MEDICINE

## 2022-04-10 PROCEDURE — 86901 BLOOD TYPING SEROLOGIC RH(D): CPT | Performed by: INTERNAL MEDICINE

## 2022-04-10 PROCEDURE — 85025 COMPLETE CBC W/AUTO DIFF WBC: CPT | Performed by: INTERNAL MEDICINE

## 2022-04-10 PROCEDURE — 25000242 PHARM REV CODE 250 ALT 637 W/ HCPCS: Performed by: INTERNAL MEDICINE

## 2022-04-10 PROCEDURE — 94640 AIRWAY INHALATION TREATMENT: CPT

## 2022-04-10 PROCEDURE — 36415 COLL VENOUS BLD VENIPUNCTURE: CPT | Performed by: INTERNAL MEDICINE

## 2022-04-10 PROCEDURE — 94761 N-INVAS EAR/PLS OXIMETRY MLT: CPT

## 2022-04-10 PROCEDURE — 86920 COMPATIBILITY TEST SPIN: CPT | Performed by: INTERNAL MEDICINE

## 2022-04-10 PROCEDURE — 11000001 HC ACUTE MED/SURG PRIVATE ROOM

## 2022-04-10 PROCEDURE — P9016 RBC LEUKOCYTES REDUCED: HCPCS | Performed by: INTERNAL MEDICINE

## 2022-04-10 PROCEDURE — 99900035 HC TECH TIME PER 15 MIN (STAT)

## 2022-04-10 PROCEDURE — 99900031 HC PATIENT EDUCATION (STAT)

## 2022-04-10 RX ORDER — HYDROCODONE BITARTRATE AND ACETAMINOPHEN 500; 5 MG/1; MG/1
TABLET ORAL
Status: DISCONTINUED | OUTPATIENT
Start: 2022-04-10 | End: 2022-04-12 | Stop reason: HOSPADM

## 2022-04-10 RX ADMIN — LEVALBUTEROL 1.25 MG: 1.25 SOLUTION, CONCENTRATE RESPIRATORY (INHALATION) at 07:04

## 2022-04-10 RX ADMIN — GUAIFENESIN AND DEXTROMETHORPHAN 10 ML: 100; 10 SYRUP ORAL at 07:04

## 2022-04-10 RX ADMIN — GUAIFENESIN AND DEXTROMETHORPHAN 10 ML: 100; 10 SYRUP ORAL at 06:04

## 2022-04-10 RX ADMIN — MIRTAZAPINE 7.5 MG: 7.5 TABLET ORAL at 09:04

## 2022-04-10 RX ADMIN — Medication 1000 UNITS: at 08:04

## 2022-04-10 RX ADMIN — AMLODIPINE BESYLATE 5 MG: 5 TABLET ORAL at 09:04

## 2022-04-10 RX ADMIN — SODIUM CHLORIDE: 0.9 INJECTION, SOLUTION INTRAVENOUS at 11:04

## 2022-04-10 RX ADMIN — HYDROCODONE BITARTRATE AND ACETAMINOPHEN 1 TABLET: 5; 325 TABLET ORAL at 06:04

## 2022-04-10 RX ADMIN — LEVALBUTEROL 1.25 MG: 1.25 SOLUTION, CONCENTRATE RESPIRATORY (INHALATION) at 02:04

## 2022-04-10 RX ADMIN — IPRATROPIUM BROMIDE 0.5 MG: 0.5 SOLUTION RESPIRATORY (INHALATION) at 02:04

## 2022-04-10 RX ADMIN — IPRATROPIUM BROMIDE 0.5 MG: 0.5 SOLUTION RESPIRATORY (INHALATION) at 07:04

## 2022-04-10 RX ADMIN — TAMSULOSIN HYDROCHLORIDE 0.4 MG: 0.4 CAPSULE ORAL at 08:04

## 2022-04-10 RX ADMIN — FENOFIBRATE 145 MG: 145 TABLET, FILM COATED ORAL at 09:04

## 2022-04-10 RX ADMIN — ENOXAPARIN SODIUM 30 MG: 30 INJECTION SUBCUTANEOUS at 04:04

## 2022-04-10 RX ADMIN — THERA TABS 1 TABLET: TAB at 08:04

## 2022-04-10 RX ADMIN — POLYETHYLENE GLYCOL (3350) 17 G: 17 POWDER, FOR SOLUTION ORAL at 08:04

## 2022-04-10 RX ADMIN — TAMSULOSIN HYDROCHLORIDE 0.4 MG: 0.4 CAPSULE ORAL at 09:04

## 2022-04-10 RX ADMIN — ASPIRIN 81 MG: 81 TABLET, COATED ORAL at 08:04

## 2022-04-10 NOTE — NURSING
IV started in the right upper arm with a 20 g. Intracath. On the first stick.in preparation for a blood transfusion. Pt tolerated the procedure well. . Pt consented to blood administration to Dr. Blum.. Written consent signed by this patient.

## 2022-04-10 NOTE — PROGRESS NOTES
[facility] Medicine  Progress Note    Patient Name: Maurice Roy  MRN: [id number]  Patient Class: IP- Rehab   Admission Date: 3/28/2022  Length of Stay: 13 days  Attending Physician: Freeman Kathleen III, MD  Primary Care Provider: Marleni Castillo MD        Subjective:     Principal Problem:Spinal stenosis of lumbar region with neurogenic claudication        HPI:  Patient is a 89-year-old male with a history of generalized arthritis prostate cancer hypertension and lower back pain.  The patient has a history of spinal stenosis and chronic lower back related issues.  At an outside facility the patient underwent a L4-L5 laminectomy and a foraminotomy at L4-L5 and S5-L5 S1.  Postoperatively the patient had pain in the back and tenderness at the surgical site.  His lower extremity weakness and neuropathic type symptoms improved after surgery.  Patient was seen by primary care after surgery and he was placed on antibiotics.  He was also started on antiplatelets and anti-platelet agents postoperatively.  Patient did have an episode of urinary tension postoperatively requiring a Nixon catheter placement urologic consultation and initiation of BPH medications.  Postop the patient also had worsening tremors was seen by Neurology had an abnormality on EKG and was seen by Cardiology and had several medication changes.  Patient also has had several electrolyte abnormalities requiring treatment.  Slowly the patient began to stabilize and he began therapy and he was referred to our inpatient rehab unit for close monitoring routine blood work close physician oversight and rehabilitative care.  I evaluated the patient this morning on the exercise bike he is very eager and motivated to complete therapy and return home.  The patient was still functioning at a modified independent functional state and is hoping to get back to goal of return home.  The patient has multiple skin tear is requiring wound  treatment as well as a hematoma to the right forearm.  Patient is also having dyspnea with speaking and moderate exertion.  Patient's chart has been reviewed feel is an excellent candidate for our unit.        Overview/Hospital Course:  3/30 SD: Pt laying in bed. Localized hematoma  to right forearm - treating with pressure dressing. Denies any complaints. Continue IPR efforts.    3/31 SD: Pt dressing, sitting in wheelchair in room. Wearing back brace. Positive attitude. Bladder training complete. Remove chavarria catheter today.    4/1 SD: Pt sitting on side of bed getting dressed with assistance, then transitions to wheelchair for therapy. Pt had urinary retention of 300ml and inability to urinate after removal of chavarria catheter. A new chavarria catheter was instilled.    4/2 WC:  stable without acute issues.    4/4/22 LFC no acute issues    4/6 SD: Sitting in wheelchair in common area. C/O constipation.    4/7 SD: C/O cough productive of green sputum. Denies fever or chills.     4/8 SD: Pt had bowel movement and feels much better. His cause is improved with neb treatments and PRN Robitussin.    4/9 ND reports cough has improved; doing well today    4/10 ND patient reports feeling weak just not hungry today and having occasional back pain which is normal since the surgery      Interval History: Pt seen and evaluated    Review of Systems   Constitutional:  Positive for activity change, appetite change and fatigue. Negative for chills and fever.   HENT:  Negative for drooling, ear pain, mouth sores, nosebleeds and sore throat.    Eyes:  Negative for discharge, itching and visual disturbance.   Respiratory:  Positive for cough. Negative for apnea, chest tightness, shortness of breath and wheezing.    Cardiovascular:  Negative for chest pain and palpitations.   Gastrointestinal:  Positive for constipation. Negative for abdominal distention, abdominal pain, blood in stool, diarrhea, nausea and vomiting.   Endocrine: Positive  for cold intolerance. Negative for heat intolerance and polyphagia.   Genitourinary:  Positive for difficulty urinating. Negative for dysuria, flank pain and frequency.   Musculoskeletal:  Positive for arthralgias and back pain. Negative for myalgias.   Skin:  Negative for rash.   Neurological:  Positive for tremors and weakness. Negative for dizziness, seizures, syncope, facial asymmetry, speech difficulty and headaches.   Hematological:  Negative for adenopathy.   Psychiatric/Behavioral:  Negative for agitation, confusion and hallucinations. The patient is not nervous/anxious.    Objective:     Vital Signs (Most Recent):  Temp: 97.2 °F (36.2 °C) (04/10/22 0542)  Pulse: 93 (04/10/22 0723)  Resp: 20 (04/10/22 0723)  BP: (!) 160/71 (04/10/22 0542)  SpO2: 97 % (04/10/22 0723)   Vital Signs (24h Range):  Temp:  [97.2 °F (36.2 °C)-99.4 °F (37.4 °C)] 97.2 °F (36.2 °C)  Pulse:  [] 93  Resp:  [18-20] 20  SpO2:  [94 %-98 %] 97 %  BP: (160-192)/(71-80) 160/71     Weight: (!) 169 kg (372 lb 9.2 oz)  Body mass index is 60.14 kg/m².    Intake/Output Summary (Last 24 hours) at 4/10/2022 1032  Last data filed at 4/10/2022 0500  Gross per 24 hour   Intake 1120 ml   Output 1000 ml   Net 120 ml        Physical Exam  Constitutional:       General: He is awake. He is not in acute distress.     Appearance: Normal appearance. He is not ill-appearing, toxic-appearing or diaphoretic.   HENT:      Head: Normocephalic and atraumatic.      Nose: Nose normal. No congestion or rhinorrhea.      Mouth/Throat:      Mouth: Mucous membranes are moist.      Pharynx: Oropharynx is clear. No oropharyngeal exudate or posterior oropharyngeal erythema.   Eyes:      General: No scleral icterus.        Right eye: No discharge.         Left eye: No discharge.      Extraocular Movements: Extraocular movements intact.      Pupils: Pupils are equal, round, and reactive to light.   Neck:      Thyroid: No thyroid mass or thyromegaly.      Vascular: No  carotid bruit.      Meningeal: Brudzinski's sign and Kernig's sign absent.   Cardiovascular:      Rate and Rhythm: Normal rate and regular rhythm.      Chest Wall: PMI is not displaced. No thrill.      Pulses: Normal pulses.      Heart sounds: Normal heart sounds. No murmur heard.    No friction rub. No gallop.   Pulmonary:      Effort: Pulmonary effort is normal. No tachypnea, accessory muscle usage, prolonged expiration or respiratory distress.      Breath sounds: Normal breath sounds. No stridor or decreased air movement. No wheezing, rhonchi or rales.   Chest:      Chest wall: No tenderness.   Abdominal:      General: Bowel sounds are normal. There is no distension.      Palpations: Abdomen is soft. There is no hepatomegaly, splenomegaly or mass.      Tenderness: There is no abdominal tenderness. There is no right CVA tenderness, left CVA tenderness, guarding or rebound.      Hernia: No hernia is present.   Musculoskeletal:         General: No swelling, tenderness, deformity or signs of injury.      Cervical back: Normal range of motion and neck supple. No rigidity. No muscular tenderness.   Lymphadenopathy:      Cervical: No cervical adenopathy.   Skin:     General: Skin is warm.      Capillary Refill: Capillary refill takes less than 2 seconds.      Coloration: Skin is not cyanotic, jaundiced or pale.      Findings: Bruising, erythema and lesion present. No petechiae or rash.   Neurological:      Mental Status: He is alert and oriented to person, place, and time.      Cranial Nerves: No cranial nerve deficit, dysarthria or facial asymmetry.      Motor: Weakness present. No tremor.      Gait: Gait abnormal.   Psychiatric:         Mood and Affect: Mood normal. Mood is not anxious or depressed. Affect is not flat.         Speech: Speech is not rapid and pressured or slurred.         Behavior: Behavior normal. Behavior is not agitated, aggressive or combative.         Thought Content: Thought content normal.  Thought content is not paranoid or delusional.         Cognition and Memory: Cognition is not impaired. Memory is not impaired.         Judgment: Judgment normal.       Significant Labs: All pertinent labs within the past 24 hours have been reviewed.  Recent Lab Results         04/10/22  0508        Baso # 0.07       Basophil % 0.7       Differential Method Automated       Eos # 0.4       Eosinophil % 3.5       Gran # (ANC) 6.6       Gran % 64.2       Hematocrit 22.7       Hemoglobin 7.3       Immature Grans (Abs) 0.03  Comment: Mild elevation in immature granulocytes is non specific and   can be seen in a variety of conditions including stress response,   acute inflammation, trauma and pregnancy. Correlation with other   laboratory and clinical findings is essential.         Immature Granulocytes 0.3       Lymph # 1.8       Lymph % 17.8       MCH 30.3       MCHC 32.2       MCV 94       Mono # 1.4       Mono % 13.5       MPV 10.6       nRBC 0       Platelets 328       RBC 2.41       RDW 15.4       WBC 10.27               Significant Imaging: I have reviewed all pertinent imaging results/findings within the past 24 hours.      Assessment/Plan:      * Spinal stenosis of lumbar region with neurogenic claudication  Continue inpatient rehab efforts      Anemia  H/h slightly decreased today - recheck h/h in am  4/10 H and H drop some more will transfuse 2 units packed red blood cells due to patient feeling weak abd having decreased appetite    Hematoma  Right forearm  Improved      BPH with obstruction/lower urinary tract symptoms  Nixon in place    Hyperkalemia  Fluctuating, monitor regular labs    Hyponatremia  Improved. Monitor regular labs.      Acute renal failure  Monitor regular labs and urine output  Cr stable at 2.1 on 4/9/22    Wenckebach's phenomenon, heart block  Stable      Hypertension  Continue home meds. Monitor blood pressure and adjust meds PRN.        VTE Risk Mitigation (From admission, onward)          Ordered     enoxaparin injection 30 mg  Daily         03/28/22 1444     IP VTE HIGH RISK PATIENT  Once         03/28/22 1432     Place sequential compression device  Until discontinued         03/28/22 1432                Discharge Planning   MARCOS:      Code Status: Full Code   Is the patient medically ready for discharge?:     Reason for patient still in hospital (select all that apply): Treatment  Discharge Plan A: Home Health                  Scarlett Blum MD  Department of Hospital Medicine   [facility] ([facility])

## 2022-04-10 NOTE — PLAN OF CARE
Problem: Adult Inpatient Plan of Care  Goal: Plan of Care Review  Outcome: Ongoing, Progressing  Goal: Patient-Specific Goal (Individualized)  Outcome: Ongoing, Progressing  Goal: Absence of Hospital-Acquired Illness or Injury  Outcome: Ongoing, Progressing  Goal: Optimal Comfort and Wellbeing  Outcome: Ongoing, Progressing  Goal: Readiness for Transition of Care  Outcome: Ongoing, Progressing     Problem: Fluid and Electrolyte Imbalance (Acute Kidney Injury/Impairment)  Goal: Fluid and Electrolyte Balance  Outcome: Ongoing, Progressing     Problem: Oral Intake Inadequate (Acute Kidney Injury/Impairment)  Goal: Optimal Nutrition Intake  Outcome: Ongoing, Progressing     Problem: Renal Function Impairment (Acute Kidney Injury/Impairment)  Goal: Effective Renal Function  Outcome: Ongoing, Progressing     Problem: Impaired Wound Healing  Goal: Optimal Wound Healing  Outcome: Ongoing, Progressing     Problem: Infection  Goal: Absence of Infection Signs and Symptoms  Outcome: Ongoing, Progressing     Problem: Skin Injury Risk Increased  Goal: Skin Health and Integrity  Outcome: Ongoing, Progressing     Problem: Fall Injury Risk  Goal: Absence of Fall and Fall-Related Injury  Outcome: Ongoing, Progressing     Problem: Bariatric Environmental Safety  Goal: Safety Maintained with Care  Outcome: Ongoing, Progressing  Will continue to monitor and will maintain a safe environment

## 2022-04-10 NOTE — SUBJECTIVE & OBJECTIVE
Interval History: Pt seen and evaluated    Review of Systems   Constitutional:  Positive for activity change, appetite change and fatigue. Negative for chills and fever.   HENT:  Negative for drooling, ear pain, mouth sores, nosebleeds and sore throat.    Eyes:  Negative for discharge, itching and visual disturbance.   Respiratory:  Positive for cough. Negative for apnea, chest tightness, shortness of breath and wheezing.    Cardiovascular:  Negative for chest pain and palpitations.   Gastrointestinal:  Positive for constipation. Negative for abdominal distention, abdominal pain, blood in stool, diarrhea, nausea and vomiting.   Endocrine: Positive for cold intolerance. Negative for heat intolerance and polyphagia.   Genitourinary:  Positive for difficulty urinating. Negative for dysuria, flank pain and frequency.   Musculoskeletal:  Positive for arthralgias and back pain. Negative for myalgias.   Skin:  Negative for rash.   Neurological:  Positive for tremors and weakness. Negative for dizziness, seizures, syncope, facial asymmetry, speech difficulty and headaches.   Hematological:  Negative for adenopathy.   Psychiatric/Behavioral:  Negative for agitation, confusion and hallucinations. The patient is not nervous/anxious.    Objective:     Vital Signs (Most Recent):  Temp: 97.2 °F (36.2 °C) (04/10/22 0542)  Pulse: 93 (04/10/22 0723)  Resp: 20 (04/10/22 0723)  BP: (!) 160/71 (04/10/22 0542)  SpO2: 97 % (04/10/22 0723)   Vital Signs (24h Range):  Temp:  [97.2 °F (36.2 °C)-99.4 °F (37.4 °C)] 97.2 °F (36.2 °C)  Pulse:  [] 93  Resp:  [18-20] 20  SpO2:  [94 %-98 %] 97 %  BP: (160-192)/(71-80) 160/71     Weight: (!) 169 kg (372 lb 9.2 oz)  Body mass index is 60.14 kg/m².    Intake/Output Summary (Last 24 hours) at 4/10/2022 1032  Last data filed at 4/10/2022 0500  Gross per 24 hour   Intake 1120 ml   Output 1000 ml   Net 120 ml        Physical Exam  Constitutional:       General: He is awake. He is not in acute  distress.     Appearance: Normal appearance. He is not ill-appearing, toxic-appearing or diaphoretic.   HENT:      Head: Normocephalic and atraumatic.      Nose: Nose normal. No congestion or rhinorrhea.      Mouth/Throat:      Mouth: Mucous membranes are moist.      Pharynx: Oropharynx is clear. No oropharyngeal exudate or posterior oropharyngeal erythema.   Eyes:      General: No scleral icterus.        Right eye: No discharge.         Left eye: No discharge.      Extraocular Movements: Extraocular movements intact.      Pupils: Pupils are equal, round, and reactive to light.   Neck:      Thyroid: No thyroid mass or thyromegaly.      Vascular: No carotid bruit.      Meningeal: Brudzinski's sign and Kernig's sign absent.   Cardiovascular:      Rate and Rhythm: Normal rate and regular rhythm.      Chest Wall: PMI is not displaced. No thrill.      Pulses: Normal pulses.      Heart sounds: Normal heart sounds. No murmur heard.    No friction rub. No gallop.   Pulmonary:      Effort: Pulmonary effort is normal. No tachypnea, accessory muscle usage, prolonged expiration or respiratory distress.      Breath sounds: Normal breath sounds. No stridor or decreased air movement. No wheezing, rhonchi or rales.   Chest:      Chest wall: No tenderness.   Abdominal:      General: Bowel sounds are normal. There is no distension.      Palpations: Abdomen is soft. There is no hepatomegaly, splenomegaly or mass.      Tenderness: There is no abdominal tenderness. There is no right CVA tenderness, left CVA tenderness, guarding or rebound.      Hernia: No hernia is present.   Musculoskeletal:         General: No swelling, tenderness, deformity or signs of injury.      Cervical back: Normal range of motion and neck supple. No rigidity. No muscular tenderness.   Lymphadenopathy:      Cervical: No cervical adenopathy.   Skin:     General: Skin is warm.      Capillary Refill: Capillary refill takes less than 2 seconds.      Coloration: Skin  is not cyanotic, jaundiced or pale.      Findings: Bruising, erythema and lesion present. No petechiae or rash.   Neurological:      Mental Status: He is alert and oriented to person, place, and time.      Cranial Nerves: No cranial nerve deficit, dysarthria or facial asymmetry.      Motor: Weakness present. No tremor.      Gait: Gait abnormal.   Psychiatric:         Mood and Affect: Mood normal. Mood is not anxious or depressed. Affect is not flat.         Speech: Speech is not rapid and pressured or slurred.         Behavior: Behavior normal. Behavior is not agitated, aggressive or combative.         Thought Content: Thought content normal. Thought content is not paranoid or delusional.         Cognition and Memory: Cognition is not impaired. Memory is not impaired.         Judgment: Judgment normal.       Significant Labs: All pertinent labs within the past 24 hours have been reviewed.  Recent Lab Results         04/10/22  0508        Baso # 0.07       Basophil % 0.7       Differential Method Automated       Eos # 0.4       Eosinophil % 3.5       Gran # (ANC) 6.6       Gran % 64.2       Hematocrit 22.7       Hemoglobin 7.3       Immature Grans (Abs) 0.03  Comment: Mild elevation in immature granulocytes is non specific and   can be seen in a variety of conditions including stress response,   acute inflammation, trauma and pregnancy. Correlation with other   laboratory and clinical findings is essential.         Immature Granulocytes 0.3       Lymph # 1.8       Lymph % 17.8       MCH 30.3       MCHC 32.2       MCV 94       Mono # 1.4       Mono % 13.5       MPV 10.6       nRBC 0       Platelets 328       RBC 2.41       RDW 15.4       WBC 10.27               Significant Imaging: I have reviewed all pertinent imaging results/findings within the past 24 hours.

## 2022-04-11 LAB
BASOPHILS # BLD AUTO: 0.06 K/UL (ref 0–0.2)
BASOPHILS NFR BLD: 0.5 % (ref 0–1.9)
DIFFERENTIAL METHOD: ABNORMAL
EOSINOPHIL # BLD AUTO: 0.2 K/UL (ref 0–0.5)
EOSINOPHIL NFR BLD: 1.8 % (ref 0–8)
ERYTHROCYTE [DISTWIDTH] IN BLOOD BY AUTOMATED COUNT: 15.7 % (ref 11.5–14.5)
HCT VFR BLD AUTO: 33.3 % (ref 40–54)
HGB BLD-MCNC: 11.3 G/DL (ref 14–18)
IMM GRANULOCYTES # BLD AUTO: 0.03 K/UL (ref 0–0.04)
IMM GRANULOCYTES NFR BLD AUTO: 0.3 % (ref 0–0.5)
LYMPHOCYTES # BLD AUTO: 1.6 K/UL (ref 1–4.8)
LYMPHOCYTES NFR BLD: 14.4 % (ref 18–48)
MCH RBC QN AUTO: 30.7 PG (ref 27–31)
MCHC RBC AUTO-ENTMCNC: 33.9 G/DL (ref 32–36)
MCV RBC AUTO: 91 FL (ref 82–98)
MONOCYTES # BLD AUTO: 1.6 K/UL (ref 0.3–1)
MONOCYTES NFR BLD: 13.9 % (ref 4–15)
NEUTROPHILS # BLD AUTO: 7.9 K/UL (ref 1.8–7.7)
NEUTROPHILS NFR BLD: 69.1 % (ref 38–73)
NRBC BLD-RTO: 0 /100 WBC
PLATELET # BLD AUTO: 335 K/UL (ref 150–450)
PMV BLD AUTO: 10.1 FL (ref 9.2–12.9)
RBC # BLD AUTO: 3.68 M/UL (ref 4.6–6.2)
WBC # BLD AUTO: 11.36 K/UL (ref 3.9–12.7)

## 2022-04-11 PROCEDURE — 97116 GAIT TRAINING THERAPY: CPT

## 2022-04-11 PROCEDURE — 94761 N-INVAS EAR/PLS OXIMETRY MLT: CPT

## 2022-04-11 PROCEDURE — 97530 THERAPEUTIC ACTIVITIES: CPT

## 2022-04-11 PROCEDURE — 94640 AIRWAY INHALATION TREATMENT: CPT

## 2022-04-11 PROCEDURE — 36415 COLL VENOUS BLD VENIPUNCTURE: CPT | Performed by: NURSE PRACTITIONER

## 2022-04-11 PROCEDURE — 99900035 HC TECH TIME PER 15 MIN (STAT)

## 2022-04-11 PROCEDURE — 25000242 PHARM REV CODE 250 ALT 637 W/ HCPCS: Performed by: INTERNAL MEDICINE

## 2022-04-11 PROCEDURE — 99900031 HC PATIENT EDUCATION (STAT)

## 2022-04-11 PROCEDURE — 11000001 HC ACUTE MED/SURG PRIVATE ROOM

## 2022-04-11 PROCEDURE — 63600175 PHARM REV CODE 636 W HCPCS: Performed by: INTERNAL MEDICINE

## 2022-04-11 PROCEDURE — 25000003 PHARM REV CODE 250: Performed by: INTERNAL MEDICINE

## 2022-04-11 PROCEDURE — 97110 THERAPEUTIC EXERCISES: CPT

## 2022-04-11 PROCEDURE — 97535 SELF CARE MNGMENT TRAINING: CPT

## 2022-04-11 PROCEDURE — 85025 COMPLETE CBC W/AUTO DIFF WBC: CPT | Performed by: NURSE PRACTITIONER

## 2022-04-11 RX ADMIN — LEVALBUTEROL 1.25 MG: 1.25 SOLUTION, CONCENTRATE RESPIRATORY (INHALATION) at 07:04

## 2022-04-11 RX ADMIN — ASPIRIN 81 MG: 81 TABLET, COATED ORAL at 08:04

## 2022-04-11 RX ADMIN — TAMSULOSIN HYDROCHLORIDE 0.4 MG: 0.4 CAPSULE ORAL at 08:04

## 2022-04-11 RX ADMIN — GUAIFENESIN AND DEXTROMETHORPHAN 10 ML: 100; 10 SYRUP ORAL at 08:04

## 2022-04-11 RX ADMIN — AMLODIPINE BESYLATE 5 MG: 5 TABLET ORAL at 08:04

## 2022-04-11 RX ADMIN — MIRTAZAPINE 7.5 MG: 7.5 TABLET ORAL at 08:04

## 2022-04-11 RX ADMIN — IPRATROPIUM BROMIDE 0.5 MG: 0.5 SOLUTION RESPIRATORY (INHALATION) at 01:04

## 2022-04-11 RX ADMIN — ENOXAPARIN SODIUM 30 MG: 30 INJECTION SUBCUTANEOUS at 04:04

## 2022-04-11 RX ADMIN — LEVALBUTEROL 1.25 MG: 1.25 SOLUTION, CONCENTRATE RESPIRATORY (INHALATION) at 01:04

## 2022-04-11 RX ADMIN — IPRATROPIUM BROMIDE 0.5 MG: 0.5 SOLUTION RESPIRATORY (INHALATION) at 07:04

## 2022-04-11 RX ADMIN — THERA TABS 1 TABLET: TAB at 08:04

## 2022-04-11 RX ADMIN — Medication 1000 UNITS: at 08:04

## 2022-04-11 RX ADMIN — FENOFIBRATE 145 MG: 145 TABLET, FILM COATED ORAL at 08:04

## 2022-04-11 RX ADMIN — POLYETHYLENE GLYCOL (3350) 17 G: 17 POWDER, FOR SOLUTION ORAL at 08:04

## 2022-04-11 NOTE — PT/OT/SLP PROGRESS
Occupational Therapy  Weekly Progress Note    Maurice Roy   MRN: [id number]   Admitting Diagnosis: Spinal stenosis of lumbar region with neurogenic claudication    OT Date of Treatment: 04/11/22   AM Start Time: 0900  AM End Time: 1100  PM Start Time: na  PM End Time: na  Treatment Type: Individual 60 and Concurrent 60  Total Time (min): 120 min      Billable Minutes:  Self Care/Home Management 60, Therapeutic Activity 30 and Therapeutic Exercise 30  Total Minutes: 120    General Precautions: Standard,    Orthopedic Precautions: spinal precautions  Braces:      Spiritual, Cultural Beliefs, Scientology Practices, Values that Affect Care: no    Subjective:  Communicated with nurse prior to session.     Pain/Comfort  Pain Rating 1: 5/10  Location - Side 1: Bilateral  Location - Orientation 1: lower  Location 1: back (and feet)  Pain Addressed 1: Reposition, Cessation of Activity, Nurse notified  Pain Rating Post-Intervention 1: 5/10    Objective:  Patient found with: chavarria catheter. Pt was cooperative and motivated with minimal verbal encouragement while exhibiting positive affect. He participated in extensive ADL retraining as further noted below emphasizing fall prevention, and use of compensatory strategies, assistive devices, and adaptive equipment in order to sustain Spinal Precautions providing extra time requiring min verbal and tactile cueing for safety awareness and technique. Also, he participated in therapeutic activity addressing functional reaching, static / dynamic standing balance, standing tolerance, and energy for task / endurance challenging him to reach in multiple planes utilizing bilateral UE's to retrieve, stabilize, manipulate, and place various light items needed to perform functional tasks of ADL's requiring min-mod verbal and tactile cueing to facilitate optimal movement patterns and posture while sustaining compliance with Spinal Precautions utilizing forward, lateral, and diagonal steps.  Pt then participated in therapeutic exercise performing 5x15 seated pushups requiring min verbal and tactile cueing for technique challenging him to lift buttocks off seated surface to end range elbow extension in order to strengthen triceps brachii to improve performance with sit <> stand transitions.    Functional Mobility:  Bed Mobility:   Supine to sit: Modified Independent   Sit to supine: Modified Independent   Rolling: Modified Independent   Scooting: Modified Independent    Transfer Training:   Sit to stand:Supervision or Set-up Assistance with Rolling Walker .  Bed <> Chair:  Step Transfer with Supervision or Set-up Assistance with Rolling Walker .  Toilet Transfer:  Pt Step Transfer with Modified Independent with Rolling Walker and Grab bars .  Patient performed shower transfer Step Transfer with Supervision or Set-up Assistance with Grab bars and shower chair.    Feeding:  Patient performed feeding with Modified Independent with dentures  .    Grooming:  Patient peformed hand washing with Modified Independent at sitting at sink.  Patient performed face washing with Modified Independent at sitting at sink.  Patient performed oral hygeine with Modified Independent at sitting at sink.    Bathing:  Patient performed bathing with Supervision or Set-up Assistance with grab bar, Handheld shower head, long-handled sponge and shower chair at Shower.    UE Dressing:  Patient performed UE Dressing with Modified Independent with extra time at Wheelchair.    LE Dressing:  Patient don/doffed socks with Supervision or Set-up Assistance, Patient performed don/doffed adult brief with Supervision or Set-up Assistance and Patient performed don/doffed pants with Supervision or Set-up Assistance    Toilet Training:  Pt performed toileting with Supervision or Set-up Assistance with Grab  bar at Commode.    Balance:   Static Sit: GOOD: Takes MODERATE challenges from all directions  Dynamic Sit:  GOOD: Maintains balance through  MODERATE excursions of active trunk movement  Static Stand: FAIR+: Takes MINIMAL challenges from all directions  Dynamic stand: FAIR+: Needs CLOSE SUPERVISION during gait and is able to right self with minor LOB      Patient left up in chair with nurse notified    ASSESSMENT:  Pt demonstrated progress with long term functional goals as noted by continued progress with functional goals in which he achieved 8/8 STG's and 4/9 LTG's. Pt now supervision to modified independent with ADL's including functional transfers with extra time, and use of assistive devices, adaptive equipment, and compensatory strategies in order to sustain Spinal Precautions.    Rehab potential is good    Activity tolerance: Good    Discharge recommendations: other (see comments) (To be further determined based on progress prior to discharge.)     Equipment recommendations: other (see comments) (To be further determined based on progress prior to discharge.)     GOALS:   Short Term Goals to be met by: 04/04/22      Patient will increase functional independence with ADLs by performing:     UE Dressing with Minimal Assistance. MET  LE Dressing with Moderate Assistance. MET  Grooming while seated at sink with Set-up Assistance. MET  Toileting from toilet with Minimal Assistance for hygiene and clothing management. MET  Bathing from  shower chair/bench with Minimal Assistance. MET  Step transfer with Minimal Assistance. MET  Toilet transfer to toilet with Minimal Assistance. MET  Increased functional strength to 4/5 for bilateral UE's. MET     Long Term Goals to be met by: 04/11/22      Patient will increase functional independence with ADLs by performing:     Feeding with [place]. MET   UE Dressing with Modified [place]. MET  LE Dressing with Modified [place]. NOT MET - Supervision  Grooming while seated at sink with Modified [place]. MET  Toileting from toilet with Modified [place] for hygiene and clothing management. NOT  MET - Supervision  Bathing from  shower chair/bench with Modified [place]. NOT MET - Supervision  Step transfer with Modified [place]. NOT MET - Supervision  Toilet transfer to toilet with Modified [place]. MET  Increased functional strength to 4+/5 for bilateral UE's. NOT MET -  4 to 4+/ 5    Plan:  Patient to be seen 5 x/week (for 90 min per day for 14 days) to address the above listed problems via self-care/home management, community/work re-entry, therapeutic activities, therapeutic exercises  Plan of Care expires: 04/11/22  Plan of Care reviewed with: patient         04/11/2022

## 2022-04-11 NOTE — PLAN OF CARE
04/11/22 1235   Medicare Message   Important Message from Medicare regarding Discharge Appeal Rights Given to patient/caregiver;Explained to patient/caregiver;Signed/date by patient/caregiver     IMM discussed with patient. Signature obtained and documented and copy given to patient.

## 2022-04-11 NOTE — PT/OT/SLP PROGRESS
Physical Therapy         Treatment        Maurice Roy   MRN: [id number]                 Billable Minutes:  Gait [id number] minutes, Therapeutic Activity 30 minutes and Therapeutic Exercise 30 minutes  Total Minutes: 90 minutes; 75 minutes individual session and 15 minutes concurrent                   General Precautions: Standard,  fall  Orthopedic Precautions:     Braces:           Subjective:  Communicated with nurse prior to and after session.     pain; feet and back FACES scale; 8/10 during gait only    Objective:  Patient found sitting up in dining room in his WC, willing to participate      Functional Mobility:  Bed Mobility:   Supine to sit: Supervision or Set-up Assistance   Sit to supine: Supervision or Set-up Assistance   Rolling: Standby Assistance   Scooting: Supervision or Set-up Assistance    Balance:   Static Sit: GOOD: Takes MODERATE challenges from all directions  Dynamic Sit:  GOOD: Maintains balance through MODERATE excursions of active trunk movement  Static Stand: FAIR+: Takes MINIMAL challenges from all directions  Dynamic stand: FAIR+: Needs CLOSE SUPERVISION during gait and is able to right self with minor LOB    Transfer Training:  Sit to stand:Stand-by Assistance with Rolling Walker slow and tasking effort; vcs' and coaxing to perform  Bed <> Chair:  Stand Pivot and Step Transfer with Stand-by Assistance with Rolling Walker      Wheelchair Training:  Pt propels WC slowly using primarily his feet x 150';' slow propulsion SBA    Gait Training:  Performed gait training using ', 50' and 100' x 2 trials;close supervision with forward posture, short step thru pattern and c/o pain in back and feet    Stair Training:  Pt ascended and descended 2 steps using B handrails and step to pattern with mod assist and vc's for safety.      Additional Treatment:  Performed B LE there ex using 3# cuff wts performed 15 reps x 3; pt rode nustep at level 3 x 15 minutes to promote LE strength and  functional endurance.    Activity Tolerance:  Patient tolerated treatment well    Patient left HOB elevated with call button in reach and nurse notified.    Assessment:  Maurice Roy is a 89 y.o. male with a medical diagnosis of Spinal stenosis of lumbar region with neurogenic claudication. He presents with progressed to close supervision level but will require 24 hour supervision for safety.    Rehab potential is good.    Activity tolerance: Good    Discharge recommendations:       Equipment recommendations:   RW and BSC delivered today    GOALS:   Multidisciplinary Problems     Physical Therapy Goals     Not on file                PLAN:    Patient to be seen 5 x/week  to address the above listed problems via gait training, therapeutic activities, wheelchair management/training, therapeutic exercises, therapeutic groups, neuromuscular re-education  Plan of Care expires: 04/11/22  Plan of Care reviewed with: patient         4/11/2022

## 2022-04-11 NOTE — PROGRESS NOTES
[facility] Medicine  Progress Note    Patient Name: Maurice Roy  MRN: [id number]  Patient Class: IP- Rehab   Admission Date: 3/28/2022  Length of Stay: 14 days  Attending Physician: Freeman Kathleen III, MD  Primary Care Provider: Marleni Castillo MD        Subjective:     Principal Problem:Spinal stenosis of lumbar region with neurogenic claudication        HPI:  Patient is a 89-year-old male with a history of generalized arthritis prostate cancer hypertension and lower back pain.  The patient has a history of spinal stenosis and chronic lower back related issues.  At an outside facility the patient underwent a L4-L5 laminectomy and a foraminotomy at L4-L5 and S5-L5 S1.  Postoperatively the patient had pain in the back and tenderness at the surgical site.  His lower extremity weakness and neuropathic type symptoms improved after surgery.  Patient was seen by primary care after surgery and he was placed on antibiotics.  He was also started on antiplatelets and anti-platelet agents postoperatively.  Patient did have an episode of urinary tension postoperatively requiring a Nixon catheter placement urologic consultation and initiation of BPH medications.  Postop the patient also had worsening tremors was seen by Neurology had an abnormality on EKG and was seen by Cardiology and had several medication changes.  Patient also has had several electrolyte abnormalities requiring treatment.  Slowly the patient began to stabilize and he began therapy and he was referred to our inpatient rehab unit for close monitoring routine blood work close physician oversight and rehabilitative care.  I evaluated the patient this morning on the exercise bike he is very eager and motivated to complete therapy and return home.  The patient was still functioning at a modified independent functional state and is hoping to get back to goal of return home.  The patient has multiple skin tear is requiring wound  treatment as well as a hematoma to the right forearm.  Patient is also having dyspnea with speaking and moderate exertion.  Patient's chart has been reviewed feel is an excellent candidate for our unit.        Overview/Hospital Course:  3/30 SD: Pt laying in bed. Localized hematoma  to right forearm - treating with pressure dressing. Denies any complaints. Continue IPR efforts.    3/31 SD: Pt dressing, sitting in wheelchair in room. Wearing back brace. Positive attitude. Bladder training complete. Remove chavarria catheter today.    4/1 SD: Pt sitting on side of bed getting dressed with assistance, then transitions to wheelchair for therapy. Pt had urinary retention of 300ml and inability to urinate after removal of chavarria catheter. A new chavarria catheter was instilled.    4/2 WC:  stable without acute issues.    4/4/22 LFC no acute issues    4/6 SD: Sitting in wheelchair in common area. C/O constipation.    4/7 SD: C/O cough productive of green sputum. Denies fever or chills.     4/8 SD: Pt had bowel movement and feels much better. His cause is improved with neb treatments and PRN Robitussin.    4/9 ND reports cough has improved; doing well today    4/10 ND patient reports feeling weak just not hungry today and having occasional back pain which is normal since the surgery    4/11 SD: Pt reports constipation - PRN orders in place. Cough improved.      Interval History: Pt seen and evaluated    Review of Systems   Constitutional:  Positive for activity change, appetite change and fatigue. Negative for chills and fever.   HENT:  Negative for drooling, ear pain, mouth sores, nosebleeds and sore throat.    Eyes:  Negative for discharge, itching and visual disturbance.   Respiratory:  Positive for cough. Negative for apnea, chest tightness, shortness of breath and wheezing.    Cardiovascular:  Negative for chest pain and palpitations.   Gastrointestinal:  Positive for constipation. Negative for abdominal distention, abdominal  pain, blood in stool, diarrhea, nausea and vomiting.   Endocrine: Positive for cold intolerance. Negative for heat intolerance and polyphagia.   Genitourinary:  Positive for difficulty urinating. Negative for dysuria, flank pain and frequency.   Musculoskeletal:  Positive for arthralgias and back pain. Negative for myalgias.   Skin:  Negative for rash.   Neurological:  Positive for tremors and weakness. Negative for dizziness, seizures, syncope, facial asymmetry, speech difficulty and headaches.   Hematological:  Negative for adenopathy.   Psychiatric/Behavioral:  Negative for agitation, confusion and hallucinations. The patient is not nervous/anxious.    Objective:     Vital Signs (Most Recent):  Temp: 97.2 °F (36.2 °C) (04/11/22 0454)  Pulse: 105 (04/11/22 0735)  Resp: 20 (04/11/22 0735)  BP: (!) 174/80 (04/11/22 0454)  SpO2: 95 % (04/11/22 0735)   Vital Signs (24h Range):  Temp:  [96 °F (35.6 °C)-99.1 °F (37.3 °C)] 97.2 °F (36.2 °C)  Pulse:  [] 105  Resp:  [12-20] 20  SpO2:  [93 %-98 %] 95 %  BP: (144-194)/(60-88) 174/80     Weight: (!) 169 kg (372 lb 9.2 oz)  Body mass index is 60.14 kg/m².    Intake/Output Summary (Last 24 hours) at 4/11/2022 0931  Last data filed at 4/11/2022 0804  Gross per 24 hour   Intake 1131.5 ml   Output 800 ml   Net 331.5 ml        Physical Exam  Constitutional:       General: He is awake. He is not in acute distress.     Appearance: Normal appearance. He is not ill-appearing, toxic-appearing or diaphoretic.   HENT:      Head: Normocephalic and atraumatic.      Nose: Nose normal. No congestion or rhinorrhea.      Mouth/Throat:      Mouth: Mucous membranes are moist.      Pharynx: Oropharynx is clear. No oropharyngeal exudate or posterior oropharyngeal erythema.   Eyes:      General: No scleral icterus.        Right eye: No discharge.         Left eye: No discharge.      Extraocular Movements: Extraocular movements intact.      Pupils: Pupils are equal, round, and reactive to  light.   Neck:      Thyroid: No thyroid mass or thyromegaly.      Vascular: No carotid bruit.      Meningeal: Brudzinski's sign and Kernig's sign absent.   Cardiovascular:      Rate and Rhythm: Normal rate and regular rhythm.      Chest Wall: PMI is not displaced. No thrill.      Pulses: Normal pulses.      Heart sounds: Normal heart sounds. No murmur heard.    No friction rub. No gallop.   Pulmonary:      Effort: Pulmonary effort is normal. No tachypnea, accessory muscle usage, prolonged expiration or respiratory distress.      Breath sounds: Normal breath sounds. No stridor or decreased air movement. No wheezing, rhonchi or rales.   Chest:      Chest wall: No tenderness.   Abdominal:      General: Bowel sounds are normal. There is no distension.      Palpations: Abdomen is soft. There is no hepatomegaly, splenomegaly or mass.      Tenderness: There is no abdominal tenderness. There is no right CVA tenderness, left CVA tenderness, guarding or rebound.      Hernia: No hernia is present.   Musculoskeletal:         General: No swelling, tenderness, deformity or signs of injury.      Cervical back: Normal range of motion and neck supple. No rigidity. No muscular tenderness.   Lymphadenopathy:      Cervical: No cervical adenopathy.   Skin:     General: Skin is warm.      Capillary Refill: Capillary refill takes less than 2 seconds.      Coloration: Skin is not cyanotic, jaundiced or pale.      Findings: Bruising, erythema and lesion present. No petechiae or rash.   Neurological:      Mental Status: He is alert and oriented to person, place, and time.      Cranial Nerves: No cranial nerve deficit, dysarthria or facial asymmetry.      Motor: Weakness present. No tremor.      Gait: Gait abnormal.   Psychiatric:         Mood and Affect: Mood normal. Mood is not anxious or depressed. Affect is not flat.         Speech: Speech is not rapid and pressured or slurred.         Behavior: Behavior normal. Behavior is not agitated,  aggressive or combative.         Thought Content: Thought content normal. Thought content is not paranoid or delusional.         Cognition and Memory: Cognition is not impaired. Memory is not impaired.         Judgment: Judgment normal.       Significant Labs: All pertinent labs within the past 24 hours have been reviewed.    Recent Lab Results         04/10/22  1045        Unit Blood Type Code 5100        5100       Unit Expiration [id number]        [id number]       Unit Blood Type O POS        O POS       CODING SYSTEM [id number]        [id number]       DISPENSE STATUS TRANSFUSED        TRANSFUSED       Group & Rh O POS       INDIRECT SABRINA NEG       Product Code Q8263M45        Y3523P55       UNIT NUMBER [id number]        [id number]               Significant Imaging: I have reviewed all pertinent imaging results/findings within the past 24 hours.      Assessment/Plan:      * Spinal stenosis of lumbar region with neurogenic claudication  Continue inpatient rehab efforts      Anemia  S/T transfusion 2 units PRBC 4/10  4/11: AM labs ordered      Hematoma  Right forearm  Improved      BPH with obstruction/lower urinary tract symptoms  Nixon in place    Hyperkalemia  Fluctuating, monitor regular labs    Hyponatremia  Improved. Monitor regular labs.      Acute renal failure  Monitor regular labs and urine output  Cr stable at 2.1 on 4/9/22    Wenckebach's phenomenon, heart block  Stable      Hypertension  Continue home meds. Monitor blood pressure and adjust meds PRN.        VTE Risk Mitigation (From admission, onward)         Ordered     enoxaparin injection 30 mg  Daily         03/28/22 1444     IP VTE HIGH RISK PATIENT  Once         03/28/22 1432     Place sequential compression device  Until discontinued         03/28/22 1432                Discharge Planning   MARCOS:      Code Status: Full Code   Is the patient medically ready for discharge?:     Reason for patient still in hospital (select all that apply):  Patient trending condition, Laboratory test, Treatment and PT / OT recommendations  Discharge Plan A: Home Health                  Madiha Brown NP  Department of Hospital Medicine   [facility])

## 2022-04-11 NOTE — SUBJECTIVE & OBJECTIVE
Interval History: Pt seen and evaluated    Review of Systems   Constitutional:  Positive for activity change, appetite change and fatigue. Negative for chills and fever.   HENT:  Negative for drooling, ear pain, mouth sores, nosebleeds and sore throat.    Eyes:  Negative for discharge, itching and visual disturbance.   Respiratory:  Positive for cough. Negative for apnea, chest tightness, shortness of breath and wheezing.    Cardiovascular:  Negative for chest pain and palpitations.   Gastrointestinal:  Positive for constipation. Negative for abdominal distention, abdominal pain, blood in stool, diarrhea, nausea and vomiting.   Endocrine: Positive for cold intolerance. Negative for heat intolerance and polyphagia.   Genitourinary:  Positive for difficulty urinating. Negative for dysuria, flank pain and frequency.   Musculoskeletal:  Positive for arthralgias and back pain. Negative for myalgias.   Skin:  Negative for rash.   Neurological:  Positive for tremors and weakness. Negative for dizziness, seizures, syncope, facial asymmetry, speech difficulty and headaches.   Hematological:  Negative for adenopathy.   Psychiatric/Behavioral:  Negative for agitation, confusion and hallucinations. The patient is not nervous/anxious.    Objective:     Vital Signs (Most Recent):  Temp: 97.2 °F (36.2 °C) (04/11/22 0454)  Pulse: 105 (04/11/22 0735)  Resp: 20 (04/11/22 0735)  BP: (!) 174/80 (04/11/22 0454)  SpO2: 95 % (04/11/22 0735)   Vital Signs (24h Range):  Temp:  [96 °F (35.6 °C)-99.1 °F (37.3 °C)] 97.2 °F (36.2 °C)  Pulse:  [] 105  Resp:  [12-20] 20  SpO2:  [93 %-98 %] 95 %  BP: (144-194)/(60-88) 174/80     Weight: (!) 169 kg (372 lb 9.2 oz)  Body mass index is 60.14 kg/m².    Intake/Output Summary (Last 24 hours) at 4/11/2022 0931  Last data filed at 4/11/2022 0804  Gross per 24 hour   Intake 1131.5 ml   Output 800 ml   Net 331.5 ml        Physical Exam  Constitutional:       General: He is awake. He is not in acute  distress.     Appearance: Normal appearance. He is not ill-appearing, toxic-appearing or diaphoretic.   HENT:      Head: Normocephalic and atraumatic.      Nose: Nose normal. No congestion or rhinorrhea.      Mouth/Throat:      Mouth: Mucous membranes are moist.      Pharynx: Oropharynx is clear. No oropharyngeal exudate or posterior oropharyngeal erythema.   Eyes:      General: No scleral icterus.        Right eye: No discharge.         Left eye: No discharge.      Extraocular Movements: Extraocular movements intact.      Pupils: Pupils are equal, round, and reactive to light.   Neck:      Thyroid: No thyroid mass or thyromegaly.      Vascular: No carotid bruit.      Meningeal: Brudzinski's sign and Kernig's sign absent.   Cardiovascular:      Rate and Rhythm: Normal rate and regular rhythm.      Chest Wall: PMI is not displaced. No thrill.      Pulses: Normal pulses.      Heart sounds: Normal heart sounds. No murmur heard.    No friction rub. No gallop.   Pulmonary:      Effort: Pulmonary effort is normal. No tachypnea, accessory muscle usage, prolonged expiration or respiratory distress.      Breath sounds: Normal breath sounds. No stridor or decreased air movement. No wheezing, rhonchi or rales.   Chest:      Chest wall: No tenderness.   Abdominal:      General: Bowel sounds are normal. There is no distension.      Palpations: Abdomen is soft. There is no hepatomegaly, splenomegaly or mass.      Tenderness: There is no abdominal tenderness. There is no right CVA tenderness, left CVA tenderness, guarding or rebound.      Hernia: No hernia is present.   Musculoskeletal:         General: No swelling, tenderness, deformity or signs of injury.      Cervical back: Normal range of motion and neck supple. No rigidity. No muscular tenderness.   Lymphadenopathy:      Cervical: No cervical adenopathy.   Skin:     General: Skin is warm.      Capillary Refill: Capillary refill takes less than 2 seconds.      Coloration: Skin  is not cyanotic, jaundiced or pale.      Findings: Bruising, erythema and lesion present. No petechiae or rash.   Neurological:      Mental Status: He is alert and oriented to person, place, and time.      Cranial Nerves: No cranial nerve deficit, dysarthria or facial asymmetry.      Motor: Weakness present. No tremor.      Gait: Gait abnormal.   Psychiatric:         Mood and Affect: Mood normal. Mood is not anxious or depressed. Affect is not flat.         Speech: Speech is not rapid and pressured or slurred.         Behavior: Behavior normal. Behavior is not agitated, aggressive or combative.         Thought Content: Thought content normal. Thought content is not paranoid or delusional.         Cognition and Memory: Cognition is not impaired. Memory is not impaired.         Judgment: Judgment normal.       Significant Labs: All pertinent labs within the past 24 hours have been reviewed.    Recent Lab Results         04/10/22  1045        Unit Blood Type Code 5100        5100       Unit Expiration [id number]        [id number]       Unit Blood Type O POS        O POS       CODING SYSTEM [id number]        [id number]       DISPENSE STATUS TRANSFUSED        TRANSFUSED       Group & Rh O POS       INDIRECT SABRINA NEG       Product Code Z2019T14        B4788R05       UNIT NUMBER [id number]        [id number]               Significant Imaging: I have reviewed all pertinent imaging results/findings within the past 24 hours.

## 2022-04-12 VITALS
SYSTOLIC BLOOD PRESSURE: 145 MMHG | DIASTOLIC BLOOD PRESSURE: 70 MMHG | TEMPERATURE: 97 F | RESPIRATION RATE: 18 BRPM | BODY MASS INDEX: 50.62 KG/M2 | WEIGHT: 315 LBS | HEART RATE: 108 BPM | HEIGHT: 66 IN | OXYGEN SATURATION: 96 %

## 2022-04-12 LAB
ALBUMIN SERPL BCP-MCNC: 2.2 G/DL (ref 3.5–5.2)
ALP SERPL-CCNC: 120 U/L (ref 55–135)
ALT SERPL W/O P-5'-P-CCNC: 42 U/L (ref 10–44)
ANION GAP SERPL CALC-SCNC: 10 MMOL/L (ref 8–16)
AST SERPL-CCNC: 53 U/L (ref 10–40)
BASOPHILS # BLD AUTO: 0.05 K/UL (ref 0–0.2)
BASOPHILS NFR BLD: 0.5 % (ref 0–1.9)
BILIRUB SERPL-MCNC: 0.6 MG/DL (ref 0.1–1)
BUN SERPL-MCNC: 44 MG/DL (ref 10–30)
CALCIUM SERPL-MCNC: 8.6 MG/DL (ref 8.7–10.5)
CHLORIDE SERPL-SCNC: 99 MMOL/L (ref 95–110)
CO2 SERPL-SCNC: 25 MMOL/L (ref 23–29)
CREAT SERPL-MCNC: 2.2 MG/DL (ref 0.5–1.4)
DIFFERENTIAL METHOD: ABNORMAL
EOSINOPHIL # BLD AUTO: 0.2 K/UL (ref 0–0.5)
EOSINOPHIL NFR BLD: 2.2 % (ref 0–8)
ERYTHROCYTE [DISTWIDTH] IN BLOOD BY AUTOMATED COUNT: 15 % (ref 11.5–14.5)
EST. GFR  (AFRICAN AMERICAN): 29 ML/MIN/1.73 M^2
EST. GFR  (NON AFRICAN AMERICAN): 25.1 ML/MIN/1.73 M^2
GLUCOSE SERPL-MCNC: 109 MG/DL (ref 70–110)
HCT VFR BLD AUTO: 32.3 % (ref 40–54)
HGB BLD-MCNC: 10.6 G/DL (ref 14–18)
IMM GRANULOCYTES # BLD AUTO: 0.04 K/UL (ref 0–0.04)
IMM GRANULOCYTES NFR BLD AUTO: 0.4 % (ref 0–0.5)
LYMPHOCYTES # BLD AUTO: 1.1 K/UL (ref 1–4.8)
LYMPHOCYTES NFR BLD: 11.6 % (ref 18–48)
MAGNESIUM SERPL-MCNC: 2 MG/DL (ref 1.6–2.6)
MCH RBC QN AUTO: 29.4 PG (ref 27–31)
MCHC RBC AUTO-ENTMCNC: 32.8 G/DL (ref 32–36)
MCV RBC AUTO: 90 FL (ref 82–98)
MONOCYTES # BLD AUTO: 1.7 K/UL (ref 0.3–1)
MONOCYTES NFR BLD: 17.6 % (ref 4–15)
NEUTROPHILS # BLD AUTO: 6.6 K/UL (ref 1.8–7.7)
NEUTROPHILS NFR BLD: 67.7 % (ref 38–73)
NRBC BLD-RTO: 0 /100 WBC
PLATELET # BLD AUTO: 319 K/UL (ref 150–450)
PMV BLD AUTO: 10.2 FL (ref 9.2–12.9)
POTASSIUM SERPL-SCNC: 4.2 MMOL/L (ref 3.5–5.1)
PROT SERPL-MCNC: 6 G/DL (ref 6–8.4)
RBC # BLD AUTO: 3.61 M/UL (ref 4.6–6.2)
SODIUM SERPL-SCNC: 134 MMOL/L (ref 136–145)
WBC # BLD AUTO: 9.76 K/UL (ref 3.9–12.7)

## 2022-04-12 PROCEDURE — 85025 COMPLETE CBC W/AUTO DIFF WBC: CPT | Performed by: NURSE PRACTITIONER

## 2022-04-12 PROCEDURE — 25000003 PHARM REV CODE 250: Performed by: INTERNAL MEDICINE

## 2022-04-12 PROCEDURE — 25000003 PHARM REV CODE 250: Performed by: NURSE PRACTITIONER

## 2022-04-12 PROCEDURE — 83735 ASSAY OF MAGNESIUM: CPT | Performed by: NURSE PRACTITIONER

## 2022-04-12 PROCEDURE — 36415 COLL VENOUS BLD VENIPUNCTURE: CPT | Performed by: NURSE PRACTITIONER

## 2022-04-12 PROCEDURE — 25000242 PHARM REV CODE 250 ALT 637 W/ HCPCS: Performed by: INTERNAL MEDICINE

## 2022-04-12 PROCEDURE — 94761 N-INVAS EAR/PLS OXIMETRY MLT: CPT

## 2022-04-12 PROCEDURE — 80053 COMPREHEN METABOLIC PANEL: CPT | Performed by: NURSE PRACTITIONER

## 2022-04-12 PROCEDURE — 99900031 HC PATIENT EDUCATION (STAT)

## 2022-04-12 PROCEDURE — 99900035 HC TECH TIME PER 15 MIN (STAT)

## 2022-04-12 PROCEDURE — 94640 AIRWAY INHALATION TREATMENT: CPT

## 2022-04-12 RX ORDER — DEXTROMETHORPHAN HYDROBROMIDE, PHENYLEPHRINE HCL AND THONZYLAMINE HCL 10; 5; 25 MG/5ML; MG/5ML; MG/5ML
10 LIQUID ORAL 3 TIMES DAILY PRN
Qty: 200 ML | Refills: 1 | Status: SHIPPED | OUTPATIENT
Start: 2022-04-12 | End: 2022-04-19

## 2022-04-12 RX ORDER — NEBIVOLOL 20 MG/1
10 TABLET ORAL DAILY
Qty: 30 TABLET | Refills: 1 | Status: SHIPPED | OUTPATIENT
Start: 2022-04-12 | End: 2022-05-17

## 2022-04-12 RX ORDER — HYDROCODONE BITARTRATE AND ACETAMINOPHEN 5; 325 MG/1; MG/1
1 TABLET ORAL EVERY 4 HOURS PRN
Qty: 60 TABLET | Refills: 0 | Status: SHIPPED | OUTPATIENT
Start: 2022-04-12 | End: 2022-05-12

## 2022-04-12 RX ORDER — LEVALBUTEROL TARTRATE 45 UG/1
1-2 AEROSOL, METERED ORAL EVERY 4 HOURS PRN
Qty: 15 G | Refills: 0 | Status: SHIPPED | OUTPATIENT
Start: 2022-04-12 | End: 2022-04-27

## 2022-04-12 RX ORDER — LEVOFLOXACIN 500 MG/1
500 TABLET, FILM COATED ORAL DAILY
Qty: 7 TABLET | Refills: 0 | Status: SHIPPED | OUTPATIENT
Start: 2022-04-12 | End: 2022-04-19

## 2022-04-12 RX ADMIN — POLYETHYLENE GLYCOL (3350) 17 G: 17 POWDER, FOR SOLUTION ORAL at 08:04

## 2022-04-12 RX ADMIN — GUAIFENESIN AND DEXTROMETHORPHAN 10 ML: 100; 10 SYRUP ORAL at 12:04

## 2022-04-12 RX ADMIN — Medication 1000 UNITS: at 08:04

## 2022-04-12 RX ADMIN — GUAIFENESIN AND DEXTROMETHORPHAN 10 ML: 100; 10 SYRUP ORAL at 06:04

## 2022-04-12 RX ADMIN — IPRATROPIUM BROMIDE 0.5 MG: 0.5 SOLUTION RESPIRATORY (INHALATION) at 07:04

## 2022-04-12 RX ADMIN — LEVALBUTEROL 1.25 MG: 1.25 SOLUTION, CONCENTRATE RESPIRATORY (INHALATION) at 07:04

## 2022-04-12 RX ADMIN — LACTULOSE 20 G: 20 SOLUTION ORAL at 12:04

## 2022-04-12 RX ADMIN — HYDROCODONE BITARTRATE AND ACETAMINOPHEN 1 TABLET: 5; 325 TABLET ORAL at 12:04

## 2022-04-12 RX ADMIN — TAMSULOSIN HYDROCHLORIDE 0.4 MG: 0.4 CAPSULE ORAL at 08:04

## 2022-04-12 RX ADMIN — THERA TABS 1 TABLET: TAB at 08:04

## 2022-04-12 RX ADMIN — ASPIRIN 81 MG: 81 TABLET, COATED ORAL at 08:04

## 2022-04-12 NOTE — PLAN OF CARE
Problem: Adult Inpatient Plan of Care  Goal: Plan of Care Review  Outcome: Ongoing, Not Progressing  Goal: Patient-Specific Goal (Individualized)  Outcome: Ongoing, Not Progressing  Goal: Absence of Hospital-Acquired Illness or Injury  Outcome: Ongoing, Not Progressing  Goal: Optimal Comfort and Wellbeing  Outcome: Ongoing, Not Progressing  Goal: Readiness for Transition of Care  Outcome: Ongoing, Not Progressing     Problem: Fluid and Electrolyte Imbalance (Acute Kidney Injury/Impairment)  Goal: Fluid and Electrolyte Balance  Outcome: Ongoing, Not Progressing     Problem: Oral Intake Inadequate (Acute Kidney Injury/Impairment)  Goal: Optimal Nutrition Intake  Outcome: Ongoing, Not Progressing     Problem: Renal Function Impairment (Acute Kidney Injury/Impairment)  Goal: Effective Renal Function  Outcome: Ongoing, Not Progressing     Problem: Impaired Wound Healing  Goal: Optimal Wound Healing  Outcome: Ongoing, Not Progressing     Problem: Infection  Goal: Absence of Infection Signs and Symptoms  Outcome: Ongoing, Not Progressing     Problem: Skin Injury Risk Increased  Goal: Skin Health and Integrity  Outcome: Ongoing, Not Progressing     Problem: Fall Injury Risk  Goal: Absence of Fall and Fall-Related Injury  Outcome: Ongoing, Not Progressing     Problem: Bariatric Environmental Safety  Goal: Safety Maintained with Care  Outcome: Ongoing, Not Progressing

## 2022-04-12 NOTE — PLAN OF CARE
[facility] (Hospital)  Discharge Final Note    Primary Care Provider: Marleni Castillo MD    Expected Discharge Date:     Final Discharge Note (most recent)     Final Note - 04/12/22 0749        Final Note    Assessment Type Final Discharge Note     Anticipated Discharge Disposition Home-Health Care [facility]     What phone number can be called within the next 1-3 days to see how you are doing after discharge? [phone]     Hospital Resources/Appts/Education Provided Appointments scheduled and added to AVS        Post-Acute Status    Post-Acute Authorization Home Health     Home Health Status Set-up Complete/Auth obtained   Pt accepted to Nursing Care. Primary nurse to call report.    Discharge Delays None known at this time                 Important Message from Medicare  Important Message from Medicare regarding Discharge Appeal Rights: Given to patient/caregiver, Explained to patient/caregiver, Signed/date by patient/caregiver          Contact Info     Marleni Castillo MD   Specialty: Family Medicine   Relationship: PCP - General    [address] [place] [address] [facility] [address]   Phone: [phone]       Next Steps: Follow up on 4/26/2022    Instructions: Follow up appointment with Dr. Castillo on 04/26/2022 at 9:00    El Mosqueda NP    [address] [place] [id number]  [phone]  Orthopedic Surgery       Next Steps: Follow up on 4/13/2022    Instructions: Follow up appointment with El Mosqueda NP on 04/13/2022 at 9:50.    Dr. Rocky Rice    [address] [facility] [address] [place] [id number]  [phone]  Urology       Next Steps: Follow up on 4/20/2022    Instructions: Follow up appointment with Dr. Rice on 04/20/2022 at 1:00.

## 2022-04-12 NOTE — PT/OT/SLP DISCHARGE
Occupational Therapy Discharge Summary    Maurice Roy  MRN: [id number]   Principal Problem: Spinal stenosis of lumbar region with neurogenic claudication      Patient Discharged from inpatient Occupational Therapy on 04/12/22.  Please refer to prior OT note dated 04/11/22 for functional status.    Assessment:     Pt was cooperative and motivated with verbal encouragement during skilled OT treatment sessions including ADL retraining, functional transfer retraining, assistive device / adaptive equipment training in order to sustain Spinal Precautions, therapeutic activity, therapeutic exercise, and patient education / instruction including discharge planning and home exercise program allowing him to progress with functional goals in which he achieved 8/8 STG's and 4/9 LTG's. Pt now supervision to modified independent with ADL's including functional transfers with extra time, and use of assistive devices, adaptive equipment, and compensatory strategies in order to sustain Spinal Precautions.    Objective:     GOALS:   Short Term Goals to be met by: 04/04/22      Patient will increase functional independence with ADLs by performing:     UE Dressing with Minimal Assistance. MET  LE Dressing with Moderate Assistance. MET  Grooming while seated at sink with Set-up Assistance. MET  Toileting from toilet with Minimal Assistance for hygiene and clothing management. MET  Bathing from  shower chair/bench with Minimal Assistance. MET  Step transfer with Minimal Assistance. MET  Toilet transfer to toilet with Minimal Assistance. MET  Increased functional strength to 4/5 for bilateral UE's. MET     Long Term Goals to be met by: 04/11/22      Patient will increase functional independence with ADLs by performing:     Feeding with [place]. MET   UE Dressing with Modified [place]. MET  LE Dressing with Modified [place]. NOT MET - Supervision  Grooming while seated at sink with Modified [place]. MET  Toileting  from toilet with Modified [place] for hygiene and clothing management. NOT MET - Supervision  Bathing from  shower chair/bench with Modified [place]. NOT MET - Supervision  Step transfer with Modified [place]. NOT MET - Supervision  Toilet transfer to toilet with Modified [place]. MET  Increased functional strength to 4+/5 for bilateral UE's. NOT MET -  4 to 4+/ 5    Reasons for Discontinuation of Therapy Services  Satisfactory goal achievement.      Plan:     Patient Discharged to: Home with Home Health Service    4/12/2022

## 2022-04-12 NOTE — NURSING
[facility] with [facility] called to verify  bystolic medication that was sent in by Dr. Ktahleen III. Script read bystolic 20 mg take  10 mg by mouth once daily. I spoke with Dr. Kathleen and called [facility] back. I spoke with Sarai to give the corrected order bystolic 10 mg take 1 tab by mouth once daily.     I also notified Dr. Kathleen II about patient's congested cough. Dr. Kathleen called in medication to [facility].

## 2022-04-12 NOTE — NURSING
Patient dc home today with home health   (nursing care).  I went over DC paperwork with patient and patient's daughter with patient/daughter  voicing understanding. All patient's belongings were packed and sent home with patient. Patient was brought downstairs via  with  hospital staff  And daughter. Reporting calling into Annette at Nursing care.

## 2022-04-13 NOTE — PT/OT/SLP DISCHARGE
Physical Therapy Discharge Summary    Name: Maurice Roy  MRN: [id number]   Principal Problem: Spinal stenosis of lumbar region with neurogenic claudication     Patient Discharged from acute Physical Therapy on 04-12-22  Please refer to prior PT noted date on 04-11-22 for functional status.     Assessment:     Pt was fully participative with therapy. Pt was seen for gait,transfer, bed mobility, and strength training. Pt was dc with supervision with most activities    Objective:     GOALS:   1. Pt will be sba with bed mobility- met  2. Pt will be sba with transfers with rw-met  3 pt will be sba with rw 100ft-met  4. Pt sba with wc mobility-met  5. Pt will go up and down 4 steps with yanet -met  LTGs  1. Pt will be ind with bed mobility-not met  2. Pt will be ind with transfers-not met  3. Pt will amb supervision for 150 ft   4. Pt will be ind with wc mobility-not met  5. Pt will go up and down 12 steps with sba-not met    Reasons for Discontinuation of Therapy Services  Satisfactory goal achievement     Plan:     Patient Discharged to: Home with Home Health Service.      4/13/2022

## 2022-04-25 NOTE — DISCHARGE SUMMARY
[facility] Medicine  Discharge Summary      Patient Name: Maurice Roy  MRN: [id number]  Patient Class: - Rehab  Admission Date: 3/28/2022  Hospital Length of Stay: 15 days  Discharge Date and Time: 4/12/2022 11:41 AM  Attending Physician: No att. providers found   Discharging Provider: Freeman Kathleen III, MD  Primary Care Provider: Marleni Castillo MD      HPI:   Patient is a 89-year-old male with a history of generalized arthritis prostate cancer hypertension and lower back pain.  The patient has a history of spinal stenosis and chronic lower back related issues.  At an outside facility the patient underwent a L4-L5 laminectomy and a foraminotomy at L4-L5 and S5-L5 S1.  Postoperatively the patient had pain in the back and tenderness at the surgical site.  His lower extremity weakness and neuropathic type symptoms improved after surgery.  Patient was seen by primary care after surgery and he was placed on antibiotics.  He was also started on antiplatelets and anti-platelet agents postoperatively.  Patient did have an episode of urinary tension postoperatively requiring a Nixon catheter placement urologic consultation and initiation of BPH medications.  Postop the patient also had worsening tremors was seen by Neurology had an abnormality on EKG and was seen by Cardiology and had several medication changes.  Patient also has had several electrolyte abnormalities requiring treatment.  Slowly the patient began to stabilize and he began therapy and he was referred to our inpatient rehab unit for close monitoring routine blood work close physician oversight and rehabilitative care.  I evaluated the patient this morning on the exercise bike he is very eager and motivated to complete therapy and return home.  The patient was still functioning at a modified independent functional state and is hoping to get back to goal of return home.  The patient has multiple skin tear is requiring wound  treatment as well as a hematoma to the right forearm.  Patient is also having dyspnea with speaking and moderate exertion.  Patient's chart has been reviewed feel is an excellent candidate for our unit.        * No surgery found *      Hospital Course:   3/30 SD: Pt laying in bed. Localized hematoma  to right forearm - treating with pressure dressing. Denies any complaints. Continue IPR efforts.    3/31 SD: Pt dressing, sitting in wheelchair in room. Wearing back brace. Positive attitude. Bladder training complete. Remove chavarria catheter today.    4/1 SD: Pt sitting on side of bed getting dressed with assistance, then transitions to wheelchair for therapy. Pt had urinary retention of 300ml and inability to urinate after removal of chavarria catheter. A new chavarria catheter was instilled.    4/2 WC:  stable without acute issues.    4/4/22 LFC no acute issues    4/6 SD: Sitting in wheelchair in common area. C/O constipation.    4/7 SD: C/O cough productive of green sputum. Denies fever or chills.     4/8 SD: Pt had bowel movement and feels much better. His cause is improved with neb treatments and PRN Robitussin.    4/9 ND reports cough has improved; doing well today    4/10 ND patient reports feeling weak just not hungry today and having occasional back pain which is normal since the surgery    4/11 SD: Pt reports constipation - PRN orders in place. Cough improved.    Discharge Note:  Patient was able to tolerate therapy and meet his required hours.  Patient made functional gains and had improved pain.  Patient's mobility and safety improved.  Unfortunately we were unable to wean the patient from his Chavarria catheter and it had to be reinserted.  Patient will follow-up with his urologist after discharge.       Goals of Care Treatment Preferences:  Code Status: Full Code      Consults:   Consults (From admission, onward)        Status Ordering Provider     Inpatient consult to Registered Dietitian/Nutritionist  Once         Provider:  (Not yet assigned)    Completed ABAD VEGA III          * Spinal stenosis of lumbar region with neurogenic claudication  Continue rehab efforts outpatient.      Anemia  S/T transfusion 2 units PRBC 4/10      Hematoma  Right forearm  Improved      BPH with obstruction/lower urinary tract symptoms  Nixon in place    Hyperkalemia  Fluctuating, monitor regular labs    Hyponatremia  Improved. Monitor regular labs.      Acute renal failure  Monitor regular labs and urine output  Cr stable at 2.1 on 4/9/22    Wenckebach's phenomenon, heart block  Stable      Hypertension  Continue home meds. Monitor blood pressure and adjust meds PRN.        Final Active Diagnoses:    Diagnosis Date Noted POA    PRINCIPAL PROBLEM:  Spinal stenosis of lumbar region with neurogenic claudication [M48.062] 10/17/2021 Yes    Anemia [D64.9] 04/09/2022 Yes    BPH with obstruction/lower urinary tract symptoms [N40.1, N13.8] 03/29/2022 Yes    Hematoma [T14.8XXA] 03/29/2022 Yes    Hyperkalemia [E87.5] 03/25/2022 Yes    Hyponatremia [E87.1] 03/22/2022 Yes    Acute renal failure [N17.9] 03/22/2022 Yes    Wenckebach's phenomenon, heart block [I44.1] 03/20/2022 Yes    Hypertension [I10] 03/17/2022 Yes      Problems Resolved During this Admission:       Discharged Condition: fair    Disposition: Home-Health Care [facility]    Follow Up:   Follow-up Information     Marleni Castillo MD Follow up on 4/26/2022.    Specialty: Family Medicine  Why: Follow up appointment with Dr. Castillo on 04/26/2022 at 9:00  Contact information:  [address] [place] [address] [facility] [address] [phone]             El Mosqueda NP Follow up on 4/13/2022.    Why: Follow up appointment with El Mosqueda NP on 04/13/2022 at 9:50.  Contact information:  [address] [facility] [address] [phone]  Orthopedic Surgery           Dr. Rocky Rice Follow up on 4/20/2022.    Why: Follow up appointment with Dr. Rice on 04/20/2022  "at 1:00.  Contact information:  [address] [place] [facility] [address]  [facility] [address]  [phone]  Urology                     Patient Instructions:      WALKER FOR HOME USE     Order Specific Question Answer Comments   Type of Walker: Adult (5'4"-6'6")    With wheels? Yes    Height: 5' 6" (1.676 m)    Weight: 77 kg (169 lb 12.8 oz)    Length of need (1-99 months): 99    Please check all that apply: Patient's condition impairs ambulation.    Please check all that apply: Patient is unable to safely ambulate without equipment.    Please check all that apply: Patient needs help to get in and out of chair.    Please check all that apply: Walker will be used for gait training.      COMMODE FOR HOME USE     Order Specific Question Answer Comments   Type: Standard    Height: 5' 6" (1.676 m)    Weight: 77 kg (169 lb 12.8 oz)    Length of need (1-99 months): 99      Diet Adult Regular     Activity as tolerated       Significant Diagnostic Studies: Noted.    Pending Diagnostic Studies:     None         Medications:  Reconciled Home Medications:      Medication List      START taking these medications    levalbuterol 45 mcg/actuation inhaler  Commonly known as: XOPENEX HFA  Inhale 1-2 puffs into the lungs every 4 (four) hours as needed for Wheezing. Rescue        CHANGE how you take these medications    nebivoloL 20 mg Tab  Commonly known as: BYSTOLIC  Take 10 mg by mouth once daily.  What changed: how much to take        CONTINUE taking these medications    amLODIPine 5 MG tablet  Commonly known as: NORVASC  Take 5 mg by mouth every evening.     CENTRUM SILVER ORAL  Take by mouth.     cholecalciferol (vitamin D3) 25 mcg (1,000 unit) capsule  Commonly known as: VITAMIN D3  Take 1,000 Units by mouth once daily.     fenofibrate 145 MG tablet  Commonly known as: TRICOR  Take 145 mg by mouth every evening.     HYDROcodone-acetaminophen 5-325 mg per tablet  Commonly known as: NORCO  Take 1 tablet by mouth every 4 (four) hours as " needed for Pain.     mirtazapine 7.5 MG Tab  Commonly known as: REMERON  Take 7.5 mg by mouth every evening.     tamsulosin 0.4 mg Cap  Commonly known as: FLOMAX  Take by mouth 2 (two) times a day.        STOP taking these medications    BACK AND BODY PAIN RELIEVER ORAL     sulfamethoxazole-trimethoprim 800-160mg 800-160 mg Tab  Commonly known as: BACTRIM DS     VITAMIN A ORAL        ASK your doctor about these medications    aspirin 81 MG EC tablet  Commonly known as: ECOTRIN  Take 81 mg by mouth once daily.     levoFLOXacin 500 MG tablet  Commonly known as: LEVAQUIN  Take 1 tablet (500 mg total) by mouth once daily. for 7 days  Ask about: Should I take this medication?     POLY-HIST DM (THONZYLAMINE) 25-5-10 mg/5 mL Liqd  Generic drug: thonzylamine-phenylephrine-DM  Take 10 mLs by mouth 3 (three) times daily as needed (cough).  Ask about: Should I take this medication?            Indwelling Lines/Drains at time of discharge:   Lines/Drains/Airways     Drain  Duration                Urethral Catheter 03/31/22 1630 16 Fr. 24 days                Time spent on the discharge of patient: 45 minutes         Freeman Kathleen III, MD  Department of Hospital Medicine  [facility])

## 2023-01-01 ENCOUNTER — HOSPITAL ENCOUNTER (OUTPATIENT)
Facility: HOSPITAL | Age: 88
Discharge: HOSPICE | End: 2023-08-27
Attending: EMERGENCY MEDICINE | Admitting: EMERGENCY MEDICINE
Payer: MEDICARE

## 2023-01-01 ENCOUNTER — HOSPITAL ENCOUNTER (EMERGENCY)
Facility: HOSPITAL | Age: 88
Discharge: ANOTHER HOSPITAL | End: 2023-09-01
Attending: EMERGENCY MEDICINE
Payer: MEDICARE

## 2023-01-01 ENCOUNTER — LAB VISIT (OUTPATIENT)
Dept: LAB | Facility: HOSPITAL | Age: 88
End: 2023-01-01
Attending: NURSE PRACTITIONER
Payer: MEDICARE

## 2023-01-01 ENCOUNTER — HOSPITAL ENCOUNTER (INPATIENT)
Facility: HOSPITAL | Age: 88
LOS: 1 days | DRG: 378 | End: 2023-09-02
Attending: HOSPITALIST | Admitting: HOSPITALIST
Payer: MEDICARE

## 2023-01-01 ENCOUNTER — LAB VISIT (OUTPATIENT)
Dept: LAB | Facility: HOSPITAL | Age: 88
End: 2023-01-01
Attending: INTERNAL MEDICINE
Payer: MEDICARE

## 2023-01-01 VITALS
BODY MASS INDEX: 24.25 KG/M2 | DIASTOLIC BLOOD PRESSURE: 70 MMHG | RESPIRATION RATE: 18 BRPM | OXYGEN SATURATION: 98 % | TEMPERATURE: 98 F | WEIGHT: 154.81 LBS | SYSTOLIC BLOOD PRESSURE: 131 MMHG | HEART RATE: 112 BPM

## 2023-01-01 VITALS
HEIGHT: 67 IN | SYSTOLIC BLOOD PRESSURE: 53 MMHG | BODY MASS INDEX: 24.22 KG/M2 | TEMPERATURE: 97 F | DIASTOLIC BLOOD PRESSURE: 31 MMHG | RESPIRATION RATE: 2 BRPM | OXYGEN SATURATION: 84 % | WEIGHT: 154.31 LBS

## 2023-01-01 VITALS
SYSTOLIC BLOOD PRESSURE: 124 MMHG | RESPIRATION RATE: 18 BRPM | DIASTOLIC BLOOD PRESSURE: 74 MMHG | OXYGEN SATURATION: 99 % | TEMPERATURE: 98 F | HEART RATE: 117 BPM | HEIGHT: 67 IN | WEIGHT: 155 LBS | BODY MASS INDEX: 24.33 KG/M2

## 2023-01-01 DIAGNOSIS — R33.9 RETENTION OF URINE, UNSPECIFIED: ICD-10-CM

## 2023-01-01 DIAGNOSIS — K92.1 MELENA: ICD-10-CM

## 2023-01-01 DIAGNOSIS — R00.0 SINUS TACHYCARDIA: ICD-10-CM

## 2023-01-01 DIAGNOSIS — K92.2 GASTROINTESTINAL HEMORRHAGE, UNSPECIFIED GASTROINTESTINAL HEMORRHAGE TYPE: Primary | ICD-10-CM

## 2023-01-01 DIAGNOSIS — K92.2 GIB (GASTROINTESTINAL BLEEDING): ICD-10-CM

## 2023-01-01 DIAGNOSIS — I95.9 HYPOTENSION, UNSPECIFIED: ICD-10-CM

## 2023-01-01 DIAGNOSIS — K92.1 GASTROINTESTINAL HEMORRHAGE WITH MELENA: Primary | ICD-10-CM

## 2023-01-01 DIAGNOSIS — D64.9 ANEMIA, UNSPECIFIED TYPE: ICD-10-CM

## 2023-01-01 DIAGNOSIS — I10 ESSENTIAL HYPERTENSION, MALIGNANT: Primary | ICD-10-CM

## 2023-01-01 DIAGNOSIS — Z46.6 FITTING AND ADJUSTMENT OF URINARY DEVICE: Primary | ICD-10-CM

## 2023-01-01 DIAGNOSIS — C61 MALIGNANT NEOPLASM OF PROSTATE: ICD-10-CM

## 2023-01-01 DIAGNOSIS — R00.0 TACHYCARDIA: ICD-10-CM

## 2023-01-01 DIAGNOSIS — D64.9 ANEMIA, UNSPECIFIED TYPE: Primary | ICD-10-CM

## 2023-01-01 LAB
ABO + RH BLD: NORMAL
ALBUMIN SERPL BCP-MCNC: 1.7 G/DL (ref 3.5–5.2)
ALBUMIN SERPL BCP-MCNC: 1.9 G/DL (ref 3.5–5.2)
ALBUMIN SERPL BCP-MCNC: 2.1 G/DL (ref 3.5–5.2)
ALBUMIN SERPL BCP-MCNC: 2.4 G/DL (ref 3.5–5.2)
ALBUMIN SERPL BCP-MCNC: 2.5 G/DL (ref 3.5–5.2)
ALBUMIN SERPL BCP-MCNC: 2.6 G/DL (ref 3.5–5.2)
ALP SERPL-CCNC: 173 U/L (ref 55–135)
ALP SERPL-CCNC: 197 U/L (ref 55–135)
ALP SERPL-CCNC: 258 U/L (ref 55–135)
ALP SERPL-CCNC: 259 U/L (ref 55–135)
ALP SERPL-CCNC: 280 U/L (ref 55–135)
ALP SERPL-CCNC: 326 U/L (ref 55–135)
ALT SERPL W/O P-5'-P-CCNC: 10 U/L (ref 10–44)
ALT SERPL W/O P-5'-P-CCNC: 7 U/L (ref 10–44)
ALT SERPL W/O P-5'-P-CCNC: 7 U/L (ref 10–44)
ALT SERPL W/O P-5'-P-CCNC: 8 U/L (ref 10–44)
ALT SERPL W/O P-5'-P-CCNC: 9 U/L (ref 10–44)
ALT SERPL W/O P-5'-P-CCNC: 9 U/L (ref 10–44)
ANION GAP SERPL CALC-SCNC: 12 MMOL/L (ref 8–16)
ANION GAP SERPL CALC-SCNC: 12 MMOL/L (ref 8–16)
ANION GAP SERPL CALC-SCNC: 3 MMOL/L (ref 8–16)
ANION GAP SERPL CALC-SCNC: 6 MMOL/L (ref 8–16)
ANION GAP SERPL CALC-SCNC: 7 MMOL/L (ref 8–16)
ANION GAP SERPL CALC-SCNC: 8 MMOL/L (ref 8–16)
ANISOCYTOSIS BLD QL SMEAR: SLIGHT
APTT PPP: 21 SEC (ref 21–32)
AST SERPL-CCNC: 15 U/L (ref 10–40)
AST SERPL-CCNC: 15 U/L (ref 10–40)
AST SERPL-CCNC: 16 U/L (ref 10–40)
AST SERPL-CCNC: 16 U/L (ref 10–40)
AST SERPL-CCNC: 18 U/L (ref 10–40)
AST SERPL-CCNC: 23 U/L (ref 10–40)
BACTERIA #/AREA URNS HPF: NEGATIVE /HPF
BACTERIA UR CULT: NORMAL
BASOPHILS # BLD AUTO: 0.03 K/UL (ref 0–0.2)
BASOPHILS # BLD AUTO: 0.04 K/UL (ref 0–0.2)
BASOPHILS # BLD AUTO: 0.04 K/UL (ref 0–0.2)
BASOPHILS # BLD AUTO: 0.06 K/UL (ref 0–0.2)
BASOPHILS # BLD AUTO: 0.06 K/UL (ref 0–0.2)
BASOPHILS NFR BLD: 0 % (ref 0–1.9)
BASOPHILS NFR BLD: 0.2 % (ref 0–1.9)
BASOPHILS NFR BLD: 0.3 % (ref 0–1.9)
BASOPHILS NFR BLD: 0.4 % (ref 0–1.9)
BASOPHILS NFR BLD: 0.6 % (ref 0–1.9)
BASOPHILS NFR BLD: 1 % (ref 0–1.9)
BILIRUB SERPL-MCNC: 0.2 MG/DL (ref 0.1–1)
BILIRUB SERPL-MCNC: 0.3 MG/DL (ref 0.1–1)
BILIRUB SERPL-MCNC: 0.4 MG/DL (ref 0.1–1)
BILIRUB SERPL-MCNC: 0.4 MG/DL (ref 0.1–1)
BILIRUB UR QL STRIP: NEGATIVE
BLD GP AB SCN CELLS X3 SERPL QL: NORMAL
BLD PROD TYP BPU: NORMAL
BLOOD UNIT EXPIRATION DATE: NORMAL
BLOOD UNIT TYPE CODE: 5100
BLOOD UNIT TYPE: NORMAL
BUN SERPL-MCNC: 33 MG/DL (ref 10–30)
BUN SERPL-MCNC: 54 MG/DL (ref 10–30)
BUN SERPL-MCNC: 55 MG/DL (ref 10–30)
BUN SERPL-MCNC: 65 MG/DL (ref 10–30)
BUN SERPL-MCNC: 70 MG/DL (ref 10–30)
BUN SERPL-MCNC: 72 MG/DL (ref 10–30)
CALCIUM SERPL-MCNC: 6.8 MG/DL (ref 8.7–10.5)
CALCIUM SERPL-MCNC: 7 MG/DL (ref 8.7–10.5)
CALCIUM SERPL-MCNC: 7 MG/DL (ref 8.7–10.5)
CALCIUM SERPL-MCNC: 7.4 MG/DL (ref 8.7–10.5)
CALCIUM SERPL-MCNC: 7.8 MG/DL (ref 8.7–10.5)
CALCIUM SERPL-MCNC: 8.2 MG/DL (ref 8.7–10.5)
CHLORIDE SERPL-SCNC: 104 MMOL/L (ref 95–110)
CHLORIDE SERPL-SCNC: 105 MMOL/L (ref 95–110)
CHLORIDE SERPL-SCNC: 105 MMOL/L (ref 95–110)
CHLORIDE SERPL-SCNC: 106 MMOL/L (ref 95–110)
CHLORIDE SERPL-SCNC: 107 MMOL/L (ref 95–110)
CHLORIDE SERPL-SCNC: 99 MMOL/L (ref 95–110)
CLARITY UR: CLEAR
CO2 SERPL-SCNC: 17 MMOL/L (ref 23–29)
CO2 SERPL-SCNC: 18 MMOL/L (ref 23–29)
CO2 SERPL-SCNC: 19 MMOL/L (ref 23–29)
CO2 SERPL-SCNC: 20 MMOL/L (ref 23–29)
CO2 SERPL-SCNC: 21 MMOL/L (ref 23–29)
CO2 SERPL-SCNC: 24 MMOL/L (ref 23–29)
CODING SYSTEM: NORMAL
COLOR UR: YELLOW
CREAT SERPL-MCNC: 1.5 MG/DL (ref 0.5–1.4)
CREAT SERPL-MCNC: 1.7 MG/DL (ref 0.5–1.4)
CREAT SERPL-MCNC: 1.8 MG/DL (ref 0.5–1.4)
CREAT SERPL-MCNC: 1.9 MG/DL (ref 0.5–1.4)
CREAT SERPL-MCNC: 1.9 MG/DL (ref 0.5–1.4)
CREAT SERPL-MCNC: 2.1 MG/DL (ref 0.5–1.4)
CROSSMATCH INTERPRETATION: NORMAL
DIFFERENTIAL METHOD: ABNORMAL
DISPENSE STATUS: NORMAL
EOSINOPHIL # BLD AUTO: 0 K/UL (ref 0–0.5)
EOSINOPHIL # BLD AUTO: 0 K/UL (ref 0–0.5)
EOSINOPHIL # BLD AUTO: 0.1 K/UL (ref 0–0.5)
EOSINOPHIL # BLD AUTO: 0.2 K/UL (ref 0–0.5)
EOSINOPHIL # BLD AUTO: 0.3 K/UL (ref 0–0.5)
EOSINOPHIL NFR BLD: 0 % (ref 0–8)
EOSINOPHIL NFR BLD: 0.4 % (ref 0–8)
EOSINOPHIL NFR BLD: 1 % (ref 0–8)
EOSINOPHIL NFR BLD: 1.3 % (ref 0–8)
EOSINOPHIL NFR BLD: 2.2 % (ref 0–8)
EOSINOPHIL NFR BLD: 4.6 % (ref 0–8)
ERYTHROCYTE [DISTWIDTH] IN BLOOD BY AUTOMATED COUNT: 14.6 % (ref 11.5–14.5)
ERYTHROCYTE [DISTWIDTH] IN BLOOD BY AUTOMATED COUNT: 15.9 % (ref 11.5–14.5)
ERYTHROCYTE [DISTWIDTH] IN BLOOD BY AUTOMATED COUNT: 15.9 % (ref 11.5–14.5)
ERYTHROCYTE [DISTWIDTH] IN BLOOD BY AUTOMATED COUNT: 16.6 % (ref 11.5–14.5)
ERYTHROCYTE [DISTWIDTH] IN BLOOD BY AUTOMATED COUNT: 18.6 % (ref 11.5–14.5)
ERYTHROCYTE [DISTWIDTH] IN BLOOD BY AUTOMATED COUNT: 21.8 % (ref 11.5–14.5)
EST. GFR  (NO RACE VARIABLE): 28.8 ML/MIN/1.73 M^2
EST. GFR  (NO RACE VARIABLE): 32.5 ML/MIN/1.73 M^2
EST. GFR  (NO RACE VARIABLE): 32.5 ML/MIN/1.73 M^2
EST. GFR  (NO RACE VARIABLE): 34.7 ML/MIN/1.73 M^2
EST. GFR  (NO RACE VARIABLE): 37.1 ML/MIN/1.73 M^2
EST. GFR  (NO RACE VARIABLE): 43.1 ML/MIN/1.73 M^2
GLUCOSE SERPL-MCNC: 102 MG/DL (ref 70–110)
GLUCOSE SERPL-MCNC: 112 MG/DL (ref 70–110)
GLUCOSE SERPL-MCNC: 150 MG/DL (ref 70–110)
GLUCOSE SERPL-MCNC: 199 MG/DL (ref 70–110)
GLUCOSE SERPL-MCNC: 97 MG/DL (ref 70–110)
GLUCOSE SERPL-MCNC: 99 MG/DL (ref 70–110)
GLUCOSE UR QL STRIP: NEGATIVE
HCT VFR BLD AUTO: 15.9 % (ref 40–54)
HCT VFR BLD AUTO: 16.4 % (ref 40–54)
HCT VFR BLD AUTO: 18.8 % (ref 40–54)
HCT VFR BLD AUTO: 24.5 % (ref 40–54)
HCT VFR BLD AUTO: 25.5 % (ref 40–54)
HCT VFR BLD AUTO: 27.9 % (ref 40–54)
HCT VFR BLD AUTO: 32.8 % (ref 40–54)
HGB BLD-MCNC: 11.5 G/DL (ref 14–18)
HGB BLD-MCNC: 5.2 G/DL (ref 14–18)
HGB BLD-MCNC: 5.9 G/DL (ref 14–18)
HGB BLD-MCNC: 6.4 G/DL (ref 14–18)
HGB BLD-MCNC: 8.7 G/DL (ref 14–18)
HGB BLD-MCNC: 9.2 G/DL (ref 14–18)
HGB BLD-MCNC: 9.4 G/DL (ref 14–18)
HGB UR QL STRIP: ABNORMAL
HYALINE CASTS #/AREA URNS LPF: 0 /LPF
HYPOCHROMIA BLD QL SMEAR: ABNORMAL
IMM GRANULOCYTES # BLD AUTO: 0.02 K/UL (ref 0–0.04)
IMM GRANULOCYTES # BLD AUTO: 0.02 K/UL (ref 0–0.04)
IMM GRANULOCYTES # BLD AUTO: 0.07 K/UL (ref 0–0.04)
IMM GRANULOCYTES # BLD AUTO: 0.13 K/UL (ref 0–0.04)
IMM GRANULOCYTES # BLD AUTO: 0.27 K/UL (ref 0–0.04)
IMM GRANULOCYTES # BLD AUTO: ABNORMAL K/UL (ref 0–0.04)
IMM GRANULOCYTES NFR BLD AUTO: 0.2 % (ref 0–0.5)
IMM GRANULOCYTES NFR BLD AUTO: 0.3 % (ref 0–0.5)
IMM GRANULOCYTES NFR BLD AUTO: 0.7 % (ref 0–0.5)
IMM GRANULOCYTES NFR BLD AUTO: 1.3 % (ref 0–0.5)
IMM GRANULOCYTES NFR BLD AUTO: 1.4 % (ref 0–0.5)
IMM GRANULOCYTES NFR BLD AUTO: ABNORMAL % (ref 0–0.5)
INR PPP: 1.5 (ref 0.8–1.2)
KETONES UR QL STRIP: NEGATIVE
LEUKOCYTE ESTERASE UR QL STRIP: ABNORMAL
LYMPHOCYTES # BLD AUTO: 1.9 K/UL (ref 1–4.8)
LYMPHOCYTES # BLD AUTO: 2.1 K/UL (ref 1–4.8)
LYMPHOCYTES # BLD AUTO: 2.6 K/UL (ref 1–4.8)
LYMPHOCYTES # BLD AUTO: 2.9 K/UL (ref 1–4.8)
LYMPHOCYTES # BLD AUTO: 3.3 K/UL (ref 1–4.8)
LYMPHOCYTES NFR BLD: 10.6 % (ref 18–48)
LYMPHOCYTES NFR BLD: 27.3 % (ref 18–48)
LYMPHOCYTES NFR BLD: 28.9 % (ref 18–48)
LYMPHOCYTES NFR BLD: 30.6 % (ref 18–48)
LYMPHOCYTES NFR BLD: 35.1 % (ref 18–48)
LYMPHOCYTES NFR BLD: 55 % (ref 18–48)
MAGNESIUM SERPL-MCNC: 1.9 MG/DL (ref 1.6–2.6)
MCH RBC QN AUTO: 31.1 PG (ref 27–31)
MCH RBC QN AUTO: 31.3 PG (ref 27–31)
MCH RBC QN AUTO: 31.9 PG (ref 27–31)
MCH RBC QN AUTO: 31.9 PG (ref 27–31)
MCH RBC QN AUTO: 32.5 PG (ref 27–31)
MCH RBC QN AUTO: 33 PG (ref 27–31)
MCHC RBC AUTO-ENTMCNC: 32.7 G/DL (ref 32–36)
MCHC RBC AUTO-ENTMCNC: 33.7 G/DL (ref 32–36)
MCHC RBC AUTO-ENTMCNC: 35.1 G/DL (ref 32–36)
MCHC RBC AUTO-ENTMCNC: 35.5 G/DL (ref 32–36)
MCHC RBC AUTO-ENTMCNC: 36 G/DL (ref 32–36)
MCHC RBC AUTO-ENTMCNC: 36.1 G/DL (ref 32–36)
MCV RBC AUTO: 89 FL (ref 82–98)
MCV RBC AUTO: 89 FL (ref 82–98)
MCV RBC AUTO: 90 FL (ref 82–98)
MCV RBC AUTO: 92 FL (ref 82–98)
MCV RBC AUTO: 92 FL (ref 82–98)
MCV RBC AUTO: 99 FL (ref 82–98)
MICROSCOPIC COMMENT: ABNORMAL
MONOCYTES # BLD AUTO: 0.7 K/UL (ref 0.3–1)
MONOCYTES # BLD AUTO: 0.8 K/UL (ref 0.3–1)
MONOCYTES # BLD AUTO: 0.9 K/UL (ref 0.3–1)
MONOCYTES # BLD AUTO: 1.2 K/UL (ref 0.3–1)
MONOCYTES # BLD AUTO: 1.7 K/UL (ref 0.3–1)
MONOCYTES NFR BLD: 12.2 % (ref 4–15)
MONOCYTES NFR BLD: 12.2 % (ref 4–15)
MONOCYTES NFR BLD: 4 % (ref 4–15)
MONOCYTES NFR BLD: 8.7 % (ref 4–15)
MONOCYTES NFR BLD: 8.8 % (ref 4–15)
MONOCYTES NFR BLD: 9.1 % (ref 4–15)
NEUTROPHILS # BLD AUTO: 15.8 K/UL (ref 1.8–7.7)
NEUTROPHILS # BLD AUTO: 3.1 K/UL (ref 1.8–7.7)
NEUTROPHILS # BLD AUTO: 5.1 K/UL (ref 1.8–7.7)
NEUTROPHILS # BLD AUTO: 5.5 K/UL (ref 1.8–7.7)
NEUTROPHILS # BLD AUTO: 5.9 K/UL (ref 1.8–7.7)
NEUTROPHILS NFR BLD: 39 % (ref 38–73)
NEUTROPHILS NFR BLD: 51.3 % (ref 38–73)
NEUTROPHILS NFR BLD: 53.9 % (ref 38–73)
NEUTROPHILS NFR BLD: 54.8 % (ref 38–73)
NEUTROPHILS NFR BLD: 62.5 % (ref 38–73)
NEUTROPHILS NFR BLD: 79.1 % (ref 38–73)
NEUTS BAND NFR BLD MANUAL: 1 %
NITRITE UR QL STRIP: NEGATIVE
NRBC BLD-RTO: 0 /100 WBC
NUM UNITS TRANS PACKED RBC: NORMAL
OB PNL STL: POSITIVE
PH UR STRIP: 6 [PH] (ref 5–8)
PLATELET # BLD AUTO: 261 K/UL (ref 150–450)
PLATELET # BLD AUTO: 269 K/UL (ref 150–450)
PLATELET # BLD AUTO: 273 K/UL (ref 150–450)
PLATELET # BLD AUTO: 386 K/UL (ref 150–450)
PLATELET # BLD AUTO: 397 K/UL (ref 150–450)
PLATELET # BLD AUTO: 398 K/UL (ref 150–450)
PMV BLD AUTO: 10 FL (ref 9.2–12.9)
PMV BLD AUTO: 10.4 FL (ref 9.2–12.9)
PMV BLD AUTO: 10.5 FL (ref 9.2–12.9)
PMV BLD AUTO: 10.6 FL (ref 9.2–12.9)
PMV BLD AUTO: 10.7 FL (ref 9.2–12.9)
PMV BLD AUTO: 9.9 FL (ref 9.2–12.9)
POTASSIUM SERPL-SCNC: 4.5 MMOL/L (ref 3.5–5.1)
POTASSIUM SERPL-SCNC: 4.6 MMOL/L (ref 3.5–5.1)
POTASSIUM SERPL-SCNC: 4.9 MMOL/L (ref 3.5–5.1)
POTASSIUM SERPL-SCNC: 5.3 MMOL/L (ref 3.5–5.1)
POTASSIUM SERPL-SCNC: 5.4 MMOL/L (ref 3.5–5.1)
POTASSIUM SERPL-SCNC: 5.4 MMOL/L (ref 3.5–5.1)
POTASSIUM SERPL-SCNC: 5.9 MMOL/L (ref 3.5–5.1)
PROT SERPL-MCNC: 3.9 G/DL (ref 6–8.4)
PROT SERPL-MCNC: 4.3 G/DL (ref 6–8.4)
PROT SERPL-MCNC: 4.9 G/DL (ref 6–8.4)
PROT SERPL-MCNC: 5.1 G/DL (ref 6–8.4)
PROT SERPL-MCNC: 5.3 G/DL (ref 6–8.4)
PROT SERPL-MCNC: 6.2 G/DL (ref 6–8.4)
PROT UR QL STRIP: ABNORMAL
PROTHROMBIN TIME: 14.3 SEC (ref 9–12.5)
RBC # BLD AUTO: 1.6 M/UL (ref 4.6–6.2)
RBC # BLD AUTO: 1.79 M/UL (ref 4.6–6.2)
RBC # BLD AUTO: 2.73 M/UL (ref 4.6–6.2)
RBC # BLD AUTO: 2.88 M/UL (ref 4.6–6.2)
RBC # BLD AUTO: 3.02 M/UL (ref 4.6–6.2)
RBC # BLD AUTO: 3.68 M/UL (ref 4.6–6.2)
RBC #/AREA URNS HPF: 17 /HPF (ref 0–4)
SODIUM SERPL-SCNC: 129 MMOL/L (ref 136–145)
SODIUM SERPL-SCNC: 130 MMOL/L (ref 136–145)
SODIUM SERPL-SCNC: 131 MMOL/L (ref 136–145)
SODIUM SERPL-SCNC: 134 MMOL/L (ref 136–145)
SODIUM SERPL-SCNC: 134 MMOL/L (ref 136–145)
SODIUM SERPL-SCNC: 135 MMOL/L (ref 136–145)
SP GR UR STRIP: 1.01 (ref 1–1.03)
SPECIMEN OUTDATE: NORMAL
SQUAMOUS #/AREA URNS HPF: 10 /HPF
URN SPEC COLLECT METH UR: ABNORMAL
UROBILINOGEN UR STRIP-ACNC: NEGATIVE EU/DL
WBC # BLD AUTO: 15.51 K/UL (ref 3.9–12.7)
WBC # BLD AUTO: 19.98 K/UL (ref 3.9–12.7)
WBC # BLD AUTO: 6.07 K/UL (ref 3.9–12.7)
WBC # BLD AUTO: 9.39 K/UL (ref 3.9–12.7)
WBC # BLD AUTO: 9.39 K/UL (ref 3.9–12.7)
WBC # BLD AUTO: 9.95 K/UL (ref 3.9–12.7)
WBC #/AREA URNS HPF: 51 /HPF (ref 0–5)

## 2023-01-01 PROCEDURE — 85025 COMPLETE CBC W/AUTO DIFF WBC: CPT | Performed by: EMERGENCY MEDICINE

## 2023-01-01 PROCEDURE — 99223 1ST HOSP IP/OBS HIGH 75: CPT | Mod: ,,, | Performed by: STUDENT IN AN ORGANIZED HEALTH CARE EDUCATION/TRAINING PROGRAM

## 2023-01-01 PROCEDURE — 36415 COLL VENOUS BLD VENIPUNCTURE: CPT | Performed by: NURSE PRACTITIONER

## 2023-01-01 PROCEDURE — 25000003 PHARM REV CODE 250: Performed by: HOSPITALIST

## 2023-01-01 PROCEDURE — P9016 RBC LEUKOCYTES REDUCED: HCPCS | Performed by: EMERGENCY MEDICINE

## 2023-01-01 PROCEDURE — 25000003 PHARM REV CODE 250: Performed by: STUDENT IN AN ORGANIZED HEALTH CARE EDUCATION/TRAINING PROGRAM

## 2023-01-01 PROCEDURE — 25000003 PHARM REV CODE 250: Performed by: EMERGENCY MEDICINE

## 2023-01-01 PROCEDURE — 36430 TRANSFUSION BLD/BLD COMPNT: CPT | Mod: 59

## 2023-01-01 PROCEDURE — 31720 CLEARANCE OF AIRWAYS: CPT

## 2023-01-01 PROCEDURE — 80053 COMPREHEN METABOLIC PANEL: CPT | Performed by: STUDENT IN AN ORGANIZED HEALTH CARE EDUCATION/TRAINING PROGRAM

## 2023-01-01 PROCEDURE — 96375 TX/PRO/DX INJ NEW DRUG ADDON: CPT

## 2023-01-01 PROCEDURE — 85007 BL SMEAR W/DIFF WBC COUNT: CPT | Performed by: EMERGENCY MEDICINE

## 2023-01-01 PROCEDURE — 85610 PROTHROMBIN TIME: CPT | Performed by: EMERGENCY MEDICINE

## 2023-01-01 PROCEDURE — 20000000 HC ICU ROOM

## 2023-01-01 PROCEDURE — 36410 VNPNXR 3YR/> PHY/QHP DX/THER: CPT

## 2023-01-01 PROCEDURE — 99285 EMERGENCY DEPT VISIT HI MDM: CPT | Mod: 25

## 2023-01-01 PROCEDURE — 51702 INSERT TEMP BLADDER CATH: CPT

## 2023-01-01 PROCEDURE — C1751 CATH, INF, PER/CENT/MIDLINE: HCPCS

## 2023-01-01 PROCEDURE — 85025 COMPLETE CBC W/AUTO DIFF WBC: CPT | Performed by: NURSE PRACTITIONER

## 2023-01-01 PROCEDURE — 86901 BLOOD TYPING SEROLOGIC RH(D): CPT | Mod: 91 | Performed by: HOSPITALIST

## 2023-01-01 PROCEDURE — 86900 BLOOD TYPING SEROLOGIC ABO: CPT | Performed by: EMERGENCY MEDICINE

## 2023-01-01 PROCEDURE — 21400001 HC TELEMETRY ROOM

## 2023-01-01 PROCEDURE — 85018 HEMOGLOBIN: CPT | Performed by: EMERGENCY MEDICINE

## 2023-01-01 PROCEDURE — 93010 EKG 12-LEAD: ICD-10-PCS | Mod: ,,, | Performed by: INTERNAL MEDICINE

## 2023-01-01 PROCEDURE — 36415 COLL VENOUS BLD VENIPUNCTURE: CPT | Mod: 59 | Performed by: EMERGENCY MEDICINE

## 2023-01-01 PROCEDURE — 63600175 PHARM REV CODE 636 W HCPCS: Performed by: HOSPITALIST

## 2023-01-01 PROCEDURE — 36415 COLL VENOUS BLD VENIPUNCTURE: CPT | Performed by: EMERGENCY MEDICINE

## 2023-01-01 PROCEDURE — 93005 ELECTROCARDIOGRAM TRACING: CPT

## 2023-01-01 PROCEDURE — 80053 COMPREHEN METABOLIC PANEL: CPT | Performed by: NURSE PRACTITIONER

## 2023-01-01 PROCEDURE — G0378 HOSPITAL OBSERVATION PER HR: HCPCS

## 2023-01-01 PROCEDURE — 99223 PR INITIAL HOSPITAL CARE,LEVL III: ICD-10-PCS | Mod: ,,, | Performed by: STUDENT IN AN ORGANIZED HEALTH CARE EDUCATION/TRAINING PROGRAM

## 2023-01-01 PROCEDURE — 80053 COMPREHEN METABOLIC PANEL: CPT | Performed by: EMERGENCY MEDICINE

## 2023-01-01 PROCEDURE — 96376 TX/PRO/DX INJ SAME DRUG ADON: CPT

## 2023-01-01 PROCEDURE — 85730 THROMBOPLASTIN TIME PARTIAL: CPT | Performed by: EMERGENCY MEDICINE

## 2023-01-01 PROCEDURE — C9113 INJ PANTOPRAZOLE SODIUM, VIA: HCPCS | Performed by: HOSPITALIST

## 2023-01-01 PROCEDURE — 85027 COMPLETE CBC AUTOMATED: CPT | Performed by: EMERGENCY MEDICINE

## 2023-01-01 PROCEDURE — 96361 HYDRATE IV INFUSION ADD-ON: CPT | Mod: 59

## 2023-01-01 PROCEDURE — 82272 OCCULT BLD FECES 1-3 TESTS: CPT | Performed by: EMERGENCY MEDICINE

## 2023-01-01 PROCEDURE — 36415 COLL VENOUS BLD VENIPUNCTURE: CPT | Performed by: INTERNAL MEDICINE

## 2023-01-01 PROCEDURE — 85025 COMPLETE CBC W/AUTO DIFF WBC: CPT | Performed by: STUDENT IN AN ORGANIZED HEALTH CARE EDUCATION/TRAINING PROGRAM

## 2023-01-01 PROCEDURE — 86920 COMPATIBILITY TEST SPIN: CPT | Performed by: EMERGENCY MEDICINE

## 2023-01-01 PROCEDURE — 87086 URINE CULTURE/COLONY COUNT: CPT | Performed by: STUDENT IN AN ORGANIZED HEALTH CARE EDUCATION/TRAINING PROGRAM

## 2023-01-01 PROCEDURE — 96374 THER/PROPH/DIAG INJ IV PUSH: CPT | Mod: 59

## 2023-01-01 PROCEDURE — 93010 ELECTROCARDIOGRAM REPORT: CPT | Mod: ,,, | Performed by: INTERNAL MEDICINE

## 2023-01-01 PROCEDURE — 63600175 PHARM REV CODE 636 W HCPCS: Performed by: EMERGENCY MEDICINE

## 2023-01-01 PROCEDURE — 99291 CRITICAL CARE FIRST HOUR: CPT | Mod: 25

## 2023-01-01 PROCEDURE — 85025 COMPLETE CBC W/AUTO DIFF WBC: CPT | Performed by: HOSPITALIST

## 2023-01-01 PROCEDURE — C9113 INJ PANTOPRAZOLE SODIUM, VIA: HCPCS | Performed by: EMERGENCY MEDICINE

## 2023-01-01 PROCEDURE — 84132 ASSAY OF SERUM POTASSIUM: CPT | Performed by: INTERNAL MEDICINE

## 2023-01-01 PROCEDURE — 81000 URINALYSIS NONAUTO W/SCOPE: CPT | Performed by: STUDENT IN AN ORGANIZED HEALTH CARE EDUCATION/TRAINING PROGRAM

## 2023-01-01 PROCEDURE — 83735 ASSAY OF MAGNESIUM: CPT | Performed by: EMERGENCY MEDICINE

## 2023-01-01 PROCEDURE — 96374 THER/PROPH/DIAG INJ IV PUSH: CPT

## 2023-01-01 PROCEDURE — 36415 COLL VENOUS BLD VENIPUNCTURE: CPT | Performed by: STUDENT IN AN ORGANIZED HEALTH CARE EDUCATION/TRAINING PROGRAM

## 2023-01-01 PROCEDURE — 85014 HEMATOCRIT: CPT | Performed by: EMERGENCY MEDICINE

## 2023-01-01 RX ORDER — FAMOTIDINE 10 MG/ML
20 INJECTION INTRAVENOUS DAILY
Status: DISCONTINUED | OUTPATIENT
Start: 2023-01-01 | End: 2023-01-01 | Stop reason: HOSPADM

## 2023-01-01 RX ORDER — MIRTAZAPINE 7.5 MG/1
7.5 TABLET, FILM COATED ORAL NIGHTLY
Status: DISCONTINUED | OUTPATIENT
Start: 2023-01-01 | End: 2023-01-01 | Stop reason: HOSPADM

## 2023-01-01 RX ORDER — ONDANSETRON 2 MG/ML
4 INJECTION INTRAMUSCULAR; INTRAVENOUS EVERY 8 HOURS PRN
Status: DISCONTINUED | OUTPATIENT
Start: 2023-01-01 | End: 2023-01-01 | Stop reason: HOSPADM

## 2023-01-01 RX ORDER — TAMSULOSIN HYDROCHLORIDE 0.4 MG/1
0.4 CAPSULE ORAL 2 TIMES DAILY
Status: DISCONTINUED | OUTPATIENT
Start: 2023-01-01 | End: 2023-01-01 | Stop reason: HOSPADM

## 2023-01-01 RX ORDER — OXYCODONE HYDROCHLORIDE 5 MG/1
10 TABLET ORAL EVERY 6 HOURS PRN
Status: DISCONTINUED | OUTPATIENT
Start: 2023-01-01 | End: 2023-01-01 | Stop reason: HOSPADM

## 2023-01-01 RX ORDER — HYDROCODONE BITARTRATE AND ACETAMINOPHEN 10; 325 MG/1; MG/1
1 TABLET ORAL EVERY 6 HOURS PRN
Status: DISCONTINUED | OUTPATIENT
Start: 2023-01-01 | End: 2023-01-01

## 2023-01-01 RX ORDER — SODIUM CHLORIDE 0.9 % (FLUSH) 0.9 %
10 SYRINGE (ML) INJECTION
Status: DISCONTINUED | OUTPATIENT
Start: 2023-01-01 | End: 2023-01-01 | Stop reason: HOSPADM

## 2023-01-01 RX ORDER — ACETAMINOPHEN 325 MG/1
650 TABLET ORAL EVERY 8 HOURS PRN
Status: DISCONTINUED | OUTPATIENT
Start: 2023-01-01 | End: 2023-01-01 | Stop reason: HOSPADM

## 2023-01-01 RX ORDER — HYDROCODONE BITARTRATE AND ACETAMINOPHEN 10; 325 MG/1; MG/1
1 TABLET ORAL EVERY 6 HOURS PRN
Status: DISCONTINUED | OUTPATIENT
Start: 2023-01-01 | End: 2023-01-01 | Stop reason: HOSPADM

## 2023-01-01 RX ORDER — TADALAFIL 5 MG/1
5 TABLET ORAL NIGHTLY
COMMUNITY

## 2023-01-01 RX ORDER — ONDANSETRON 2 MG/ML
8 INJECTION INTRAMUSCULAR; INTRAVENOUS EVERY 4 HOURS PRN
Status: DISCONTINUED | OUTPATIENT
Start: 2023-01-01 | End: 2023-01-01 | Stop reason: HOSPADM

## 2023-01-01 RX ORDER — TALC
6 POWDER (GRAM) TOPICAL NIGHTLY PRN
Status: DISCONTINUED | OUTPATIENT
Start: 2023-01-01 | End: 2023-01-01 | Stop reason: HOSPADM

## 2023-01-01 RX ORDER — FENTANYL CITRATE 50 UG/ML
50 INJECTION, SOLUTION INTRAMUSCULAR; INTRAVENOUS
Status: COMPLETED | OUTPATIENT
Start: 2023-01-01 | End: 2023-01-01

## 2023-01-01 RX ORDER — ACETAMINOPHEN 325 MG/1
650 TABLET ORAL 2 TIMES DAILY
Status: DISCONTINUED | OUTPATIENT
Start: 2023-01-01 | End: 2023-01-01 | Stop reason: HOSPADM

## 2023-01-01 RX ORDER — FENTANYL CITRATE 50 UG/ML
75 INJECTION, SOLUTION INTRAMUSCULAR; INTRAVENOUS
Status: DISCONTINUED | OUTPATIENT
Start: 2023-01-01 | End: 2023-01-01 | Stop reason: HOSPADM

## 2023-01-01 RX ORDER — PANTOPRAZOLE SODIUM 40 MG/10ML
80 INJECTION, POWDER, LYOPHILIZED, FOR SOLUTION INTRAVENOUS
Status: COMPLETED | OUTPATIENT
Start: 2023-01-01 | End: 2023-01-01

## 2023-01-01 RX ORDER — HYDROMORPHONE HYDROCHLORIDE 1 MG/ML
1 INJECTION, SOLUTION INTRAMUSCULAR; INTRAVENOUS; SUBCUTANEOUS EVERY 4 HOURS PRN
Status: DISCONTINUED | OUTPATIENT
Start: 2023-01-01 | End: 2023-01-01 | Stop reason: HOSPADM

## 2023-01-01 RX ORDER — SODIUM CHLORIDE 9 MG/ML
INJECTION, SOLUTION INTRAVENOUS CONTINUOUS
Status: DISCONTINUED | OUTPATIENT
Start: 2023-01-01 | End: 2023-01-01 | Stop reason: HOSPADM

## 2023-01-01 RX ORDER — ONDANSETRON 2 MG/ML
8 INJECTION INTRAMUSCULAR; INTRAVENOUS
Status: COMPLETED | OUTPATIENT
Start: 2023-01-01 | End: 2023-01-01

## 2023-01-01 RX ORDER — ALPRAZOLAM 0.25 MG/1
0.25 TABLET ORAL NIGHTLY PRN
Status: DISCONTINUED | OUTPATIENT
Start: 2023-01-01 | End: 2023-01-01 | Stop reason: HOSPADM

## 2023-01-01 RX ORDER — HYDROCODONE BITARTRATE AND ACETAMINOPHEN 500; 5 MG/1; MG/1
TABLET ORAL
Status: DISCONTINUED | OUTPATIENT
Start: 2023-01-01 | End: 2023-01-01 | Stop reason: HOSPADM

## 2023-01-01 RX ORDER — DEXTROMETHORPHAN HYDROBROMIDE, GUAIFENESIN 5; 100 MG/5ML; MG/5ML
1300 LIQUID ORAL 2 TIMES DAILY
COMMUNITY

## 2023-01-01 RX ORDER — MUPIROCIN 20 MG/G
OINTMENT TOPICAL 2 TIMES DAILY
Status: DISCONTINUED | OUTPATIENT
Start: 2023-01-01 | End: 2023-01-01 | Stop reason: HOSPADM

## 2023-01-01 RX ORDER — CETIRIZINE HYDROCHLORIDE 10 MG/1
5 TABLET ORAL NIGHTLY
COMMUNITY

## 2023-01-01 RX ORDER — METOPROLOL SUCCINATE 25 MG/1
25 TABLET, EXTENDED RELEASE ORAL DAILY
Status: DISCONTINUED | OUTPATIENT
Start: 2023-01-01 | End: 2023-01-01

## 2023-01-01 RX ADMIN — ACETAMINOPHEN 650 MG: 325 TABLET ORAL at 09:08

## 2023-01-01 RX ADMIN — PANTOPRAZOLE SODIUM 8 MG/HR: 40 INJECTION, POWDER, FOR SOLUTION INTRAVENOUS at 12:09

## 2023-01-01 RX ADMIN — HYDROMORPHONE HYDROCHLORIDE 1 MG: 1 INJECTION, SOLUTION INTRAMUSCULAR; INTRAVENOUS; SUBCUTANEOUS at 11:09

## 2023-01-01 RX ADMIN — ONDANSETRON HYDROCHLORIDE 8 MG: 2 SOLUTION INTRAMUSCULAR; INTRAVENOUS at 11:09

## 2023-01-01 RX ADMIN — SODIUM CHLORIDE 8 MG/HR: 900 INJECTION INTRAVENOUS at 01:09

## 2023-01-01 RX ADMIN — HYDROMORPHONE HYDROCHLORIDE 1 MG: 1 INJECTION, SOLUTION INTRAMUSCULAR; INTRAVENOUS; SUBCUTANEOUS at 07:09

## 2023-01-01 RX ADMIN — HYDROMORPHONE HYDROCHLORIDE 1 MG: 1 INJECTION, SOLUTION INTRAMUSCULAR; INTRAVENOUS; SUBCUTANEOUS at 03:09

## 2023-01-01 RX ADMIN — FAMOTIDINE 20 MG: 10 INJECTION INTRAVENOUS at 09:08

## 2023-01-01 RX ADMIN — ACETAMINOPHEN 650 MG: 325 TABLET ORAL at 08:08

## 2023-01-01 RX ADMIN — FENTANYL CITRATE 50 MCG: 50 INJECTION, SOLUTION INTRAMUSCULAR; INTRAVENOUS at 09:09

## 2023-01-01 RX ADMIN — ONDANSETRON HYDROCHLORIDE 8 MG: 2 SOLUTION INTRAMUSCULAR; INTRAVENOUS at 08:09

## 2023-01-01 RX ADMIN — FENTANYL CITRATE 75 MCG: 50 INJECTION, SOLUTION INTRAMUSCULAR; INTRAVENOUS at 12:09

## 2023-01-01 RX ADMIN — HYDROCODONE BITARTRATE AND ACETAMINOPHEN 1 TABLET: 10; 325 TABLET ORAL at 10:08

## 2023-01-01 RX ADMIN — SODIUM CHLORIDE 8 MG/HR: 900 INJECTION INTRAVENOUS at 08:09

## 2023-01-01 RX ADMIN — PANTOPRAZOLE SODIUM 80 MG: 40 INJECTION, POWDER, FOR SOLUTION INTRAVENOUS at 07:09

## 2023-01-01 RX ADMIN — SODIUM ZIRCONIUM CYCLOSILICATE 5 G: 5 POWDER, FOR SUSPENSION ORAL at 09:08

## 2023-01-01 RX ADMIN — SODIUM ZIRCONIUM CYCLOSILICATE 5 G: 5 POWDER, FOR SUSPENSION ORAL at 08:08

## 2023-01-01 RX ADMIN — FAMOTIDINE 20 MG: 10 INJECTION INTRAVENOUS at 08:08

## 2023-01-01 RX ADMIN — SODIUM CHLORIDE: 9 INJECTION, SOLUTION INTRAVENOUS at 02:08

## 2023-01-01 RX ADMIN — MUPIROCIN: 20 OINTMENT TOPICAL at 08:08

## 2023-01-01 RX ADMIN — HYDROMORPHONE HYDROCHLORIDE 1 MG: 1 INJECTION, SOLUTION INTRAMUSCULAR; INTRAVENOUS; SUBCUTANEOUS at 02:09

## 2023-01-01 RX ADMIN — SODIUM ZIRCONIUM CYCLOSILICATE 5 G: 5 POWDER, FOR SUSPENSION ORAL at 06:08

## 2023-01-01 RX ADMIN — SODIUM CHLORIDE 8 MG/HR: 900 INJECTION INTRAVENOUS at 03:09

## 2023-01-01 RX ADMIN — MUPIROCIN: 20 OINTMENT TOPICAL at 09:08

## 2023-01-01 RX ADMIN — SODIUM CHLORIDE: 9 INJECTION, SOLUTION INTRAVENOUS at 06:08

## 2023-01-01 RX ADMIN — PANTOPRAZOLE SODIUM 8 MG/HR: 40 INJECTION, POWDER, FOR SOLUTION INTRAVENOUS at 07:09

## 2023-01-01 RX ADMIN — FAMOTIDINE 20 MG: 10 INJECTION INTRAVENOUS at 04:08

## 2023-01-01 RX ADMIN — FENTANYL CITRATE 75 MCG: 50 INJECTION, SOLUTION INTRAMUSCULAR; INTRAVENOUS at 10:09

## 2023-01-01 RX ADMIN — SODIUM CHLORIDE 1000 ML: 9 INJECTION, SOLUTION INTRAVENOUS at 07:09

## 2023-03-20 NOTE — ASSESSMENT & PLAN NOTE
H/h slightly decreased today - recheck h/h in am  4/10 H and H drop some more will transfuse 2 units packed red blood cells due to patient feeling weak abd having decreased appetite   Normal vision: sees adequately in most situations; can see medication labels, newsprint

## 2023-06-07 PROBLEM — C79.51 METASTASIS TO BONE: Status: ACTIVE | Noted: 2023-01-01

## 2023-06-07 PROBLEM — C61 PRIMARY PROSTATE ADENOCARCINOMA: Status: ACTIVE | Noted: 2023-01-01

## 2023-07-10 PROBLEM — E83.51 HYPOCALCEMIA: Status: ACTIVE | Noted: 2023-01-01

## 2023-07-10 PROBLEM — R53.1 GENERALIZED WEAKNESS: Status: ACTIVE | Noted: 2023-01-01

## 2023-07-13 PROBLEM — N17.9 AKI (ACUTE KIDNEY INJURY): Status: RESOLVED | Noted: 2022-03-22 | Resolved: 2023-01-01

## 2023-07-13 PROBLEM — R53.1 GENERALIZED WEAKNESS: Status: RESOLVED | Noted: 2023-01-01 | Resolved: 2023-01-01

## 2023-07-13 PROBLEM — E87.1 HYPONATREMIA: Status: RESOLVED | Noted: 2022-03-22 | Resolved: 2023-01-01

## 2023-07-13 PROBLEM — E83.51 HYPOCALCEMIA: Status: RESOLVED | Noted: 2023-01-01 | Resolved: 2023-01-01

## 2023-08-25 PROBLEM — R00.0 TACHYCARDIA: Status: ACTIVE | Noted: 2023-01-01

## 2023-08-25 NOTE — SUBJECTIVE & OBJECTIVE
Past Medical History:   Diagnosis Date    Arthritis     Bilateral hearing loss     Hearing aids    Cancer     prostate    Hypertension     Mobility impaired     recent back surgery, using walker / wheelchair       Past Surgical History:   Procedure Laterality Date    ANKLE SURGERY      right    CHOLECYSTECTOMY      COLONOSCOPY      CYSTOSCOPY N/A 5/17/2022    Procedure: CYSTOSCOPY;  Surgeon: Rocky Rice MD;  Location: [facility] OR;  Service: Urology;  Laterality: N/A;    FACETECTOMY OF VERTEBRA Bilateral 3/16/2022    Procedure: FACETECTOMY, PARTIAL MEDIAL, BILATERAL, L4-5 AND L5-S1;  Surgeon: Anil Hector MD;  Location: [facility] OR;  Service: Orthopedics;  Laterality: Bilateral;    FORAMINOTOMY Bilateral 3/16/2022    Procedure: FORAMINOTOMY, SPINE, BILATERAL, L4-5 AND L5-S1;  Surgeon: Anil Hector MD;  Location: [facility] OR;  Service: Orthopedics;  Laterality: Bilateral;    LAMINOTOMY N/A 3/16/2022    Procedure: LAMINOTOMY S1;  Surgeon: Anil Hector MD;  Location: [facility] OR;  Service: Orthopedics;  Laterality: N/A;    LUMBAR LAMINECTOMY N/A 3/16/2022    Procedure: LAMINECTOMY, SPINE, LUMBAR, OPEN, L4 AND L5;  Surgeon: Anil Hector MD;  Location: [facility] OR;  Service: Orthopedics;  Laterality: N/A;  Will go to CCU Post-op per Dr. LUEVANO    PERIPHERALLY INSERTED CENTRAL CATHETER INSERTION N/A 3/24/2022    Procedure: INSERTION, PICC;  Surgeon: Kathryn Diagnostic Provider;  Location: [facility] OR;  Service: General;  Laterality: N/A;    TRANSURETHRAL RESECTION OF PROSTATE (TURP) USING THULIUM LASER N/A 5/17/2022    Procedure: TRANSURETHRAL W QUANTA LASER (TURP);  Surgeon: Rocky Rice MD;  Location: [facility] OR;  Service: Urology;  Laterality: N/A;  To follow [facility] [place]       Review of patient's allergies indicates:  No Known Allergies    No current facility-administered medications on file prior to encounter.     Current Outpatient Medications on File Prior to Encounter   Medication Sig    acetaminophen (TYLENOL ARTHRITIS PAIN) 650 MG  TbSR Take 1,300 mg by mouth 2 (two) times a day.    amLODIPine (NORVASC) 5 MG tablet Take 1 tablet (5 mg total) by mouth 2 (two) times daily.    aspirin (ECOTRIN) 81 MG EC tablet Take 81 mg by mouth once daily.    cetirizine (ZYRTEC) 10 MG tablet Take 5 mg by mouth every evening.    enzalutamide 80 mg Tab Take 160 mg by mouth once daily.    fenofibrate (TRICOR) 145 MG tablet Take 145 mg by mouth every evening.    furosemide (LASIX) 20 MG tablet     metoprolol succinate (TOPROL-XL) 25 MG 24 hr tablet     mirtazapine (REMERON) 7.5 MG Tab Take 7.5 mg by mouth every evening.    tadalafiL (CIALIS) 5 MG tablet Take 5 mg by mouth every evening.    tamsulosin (FLOMAX) 0.4 mg Cap Take by mouth 2 (two) times a day.    calcium carbonate 1250 MG capsule Take 3 capsules (1,500 mg total) by mouth 2 (two) times daily with meals.    cholecalciferol, vitamin D3, (VITAMIN D3) 25 mcg (1,000 unit) capsule Take 1,000 Units by mouth once daily.    HYDROcodone-acetaminophen (NORCO)  mg per tablet     levalbuterol (XOPENEX HFA) 45 mcg/actuation inhaler Inhale 1-2 puffs into the lungs every 4 (four) hours as needed for Wheezing. Rescue    lisinopriL 10 MG tablet Take 1 tablet (10 mg total) by mouth once daily.    nebivoloL (BYSTOLIC) 20 mg Tab Take 10 mg by mouth once daily.     Family History       Problem Relation (Age of Onset)    Heart disease Mother, Father    Hypertension Mother, Father          Tobacco Use    Smoking status: Former    Smokeless tobacco: Former    Tobacco comments:     stopped 30 years ago   Substance and Sexual Activity    Alcohol use: Not Currently     Alcohol/week: 5.0 standard drinks of alcohol     Types: 5 Cans of beer per week    Drug use: Never    Sexual activity: Not Currently     Review of Systems   Constitutional:  Positive for activity change, appetite change and fatigue. Negative for chills, diaphoresis and fever.   HENT:  Negative for sinus pain and sore throat.    Respiratory:  Negative for cough,  chest tightness, shortness of breath, wheezing and stridor.    Cardiovascular:  Negative for chest pain, palpitations and leg swelling.   Gastrointestinal:  Positive for constipation and diarrhea. Negative for abdominal distention, abdominal pain and vomiting.   Genitourinary:  Negative for dysuria.   Musculoskeletal:  Positive for arthralgias and back pain. Negative for joint swelling, myalgias and neck pain.   Skin:  Positive for pallor.   Neurological:  Positive for weakness, light-headedness and headaches. Negative for dizziness, tremors, seizures and syncope.   Psychiatric/Behavioral:  Negative for agitation, behavioral problems, confusion and dysphoric mood.      Objective:     Vital Signs (Most Recent):  Temp: 98.2 °F (36.8 °C) (08/25/23 1434)  Pulse: (!) 120 (08/25/23 1434)  Resp: 16 (08/25/23 1434)  BP: (!) 123/54 (08/25/23 1434)  SpO2: 100 % (08/25/23 1434) Vital Signs (24h Range):  Temp:  [97.5 °F (36.4 °C)-98.2 °F (36.8 °C)] 98.2 °F (36.8 °C)  Pulse:  [110-126] 120  Resp:  [16-20] 16  SpO2:  [99 %-100 %] 100 %  BP: (106-132)/(46-59) 123/54     Weight: 69.9 kg (154 lb)  Body mass index is 24.12 kg/m².     Physical Exam  Vitals and nursing note reviewed.   Constitutional:       Appearance: Normal appearance. He is ill-appearing. He is not toxic-appearing or diaphoretic.   HENT:      Head: Normocephalic and atraumatic.      Right Ear: External ear normal.      Left Ear: External ear normal.      Ears:      Comments: Hearing aid in place     Nose: Nose normal.      Mouth/Throat:      Mouth: Mucous membranes are dry.      Pharynx: Oropharynx is clear. No oropharyngeal exudate or posterior oropharyngeal erythema.   Eyes:      Extraocular Movements: Extraocular movements intact.      Conjunctiva/sclera: Conjunctivae normal.   Cardiovascular:      Rate and Rhythm: Regular rhythm. Tachycardia present.      Pulses: Normal pulses.      Heart sounds: Normal heart sounds. No murmur heard.  Pulmonary:      Effort:  Pulmonary effort is normal. No respiratory distress.      Breath sounds: Normal breath sounds. No wheezing or rales.   Abdominal:      General: Abdomen is flat.      Palpations: Abdomen is soft. There is no mass.   Musculoskeletal:         General: Normal range of motion.      Cervical back: Normal range of motion and neck supple. No rigidity or tenderness.      Right lower leg: No edema.      Left lower leg: No edema.   Skin:     General: Skin is warm and dry.      Coloration: Skin is pale.      Findings: Bruising present. No lesion.   Neurological:      General: No focal deficit present.      Mental Status: He is alert and oriented to person, place, and time.      Motor: No weakness.      Gait: Gait normal.   Psychiatric:         Mood and Affect: Mood normal.         Behavior: Behavior normal.         Thought Content: Thought content normal.         Judgment: Judgment normal.             Significant Labs: All pertinent labs within the past 24 hours have been reviewed.  Bilirubin:   Recent Labs   Lab 08/11/23  1005 08/14/23  1227 08/17/23  1323 08/25/23  1234   BILITOT 0.3 0.5 0.4 0.3     BMP:   Recent Labs   Lab 08/25/23  1234   *   *   K 5.4*      CO2 20*   BUN 65*   CREATININE 1.9*   CALCIUM 8.2*   MG 1.9     CBC:   Recent Labs   Lab 08/25/23  1234   WBC 9.39   HGB 5.9*   HCT 16.4*        CMP:   Recent Labs   Lab 08/25/23  1234   *   K 5.4*      CO2 20*   *   BUN 65*   CREATININE 1.9*   CALCIUM 8.2*   PROT 4.9*   ALBUMIN 2.1*   BILITOT 0.3   ALKPHOS 259*   AST 15   ALT 10   ANIONGAP 7*     Magnesium:   Recent Labs   Lab 08/25/23  1234   MG 1.9     TSH:   Recent Labs   Lab 07/11/23  0942   TSH 1.90     Urine Studies:   Recent Labs   Lab 08/25/23  1529   COLORU Yellow   APPEARANCEUA Clear   PHUR 6.0   SPECGRAV 1.015   PROTEINUA Trace*   GLUCUA Negative   KETONESU Negative   BILIRUBINUA Negative   OCCULTUA 1+*   NITRITE Negative   UROBILINOGEN Negative   LEUKOCYTESUR 2+*    RBCUA 17*   WBCUA 51*   BACTERIA Negative   SQUAMEPITHEL 10   HYALINECASTS 0     Significant Imaging: I have reviewed all pertinent imaging results/findings within the past 24 hours.

## 2023-08-25 NOTE — ED PROVIDER NOTES
"Encounter Date: 8/25/2023       History     Chief Complaint   Patient presents with    Back Pain    Headache     Pt to ED via EMS with complaints of "tailbone" pain and headache. Stated that HHN was at residence to change chavarria cath and sent pt to ED due to pain.      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  Patient states tailbone pain has been there for quite some time.  Home health was at his home to change out his Chavarria and sent him to the emergency department due to pain.  Chavarria was not changed out.  Denies any fever.    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  Was hospitalized on 07/09/2023 due to severe hypocalcemia, weakness, nausea, diarrhea. Brought to clinic in wheelchair by daughter.  Patient is hard of hearing.  Today patient states he is feeling quite poor and has felt bad for the last few weeks.  He states he is had nausea which is moderately controlled with Zofran.  Diarrhea does wax and wane.  Takes Imodium for diarrhea however then experiences constipation.  He is feeling quite fatigued and weak.  Overall feels he has a poor quality of life.  Does complain of headaches that wax and wane.  Denies fever or shortness of breath.  Does complain of decreased appetite with significant weight loss since last visit.      Review of patient's allergies indicates:  No Known Allergies  Past Medical History:   Diagnosis Date    Arthritis     Bilateral hearing loss     Hearing aids    Cancer     prostate    Hypertension     Mobility impaired     recent back surgery, using walker / wheelchair     Past Surgical History:   Procedure Laterality Date    ANKLE SURGERY      right    CHOLECYSTECTOMY      " COLONOSCOPY      CYSTOSCOPY N/A 5/17/2022    Procedure: CYSTOSCOPY;  Surgeon: Rocky Rice MD;  Location: [facility] OR;  Service: Urology;  Laterality: N/A;    FACETECTOMY OF VERTEBRA Bilateral 3/16/2022    Procedure: FACETECTOMY, PARTIAL MEDIAL, BILATERAL, L4-5 AND L5-S1;  Surgeon: Anil Hector MD;  Location: [facility] OR;  Service: Orthopedics;  Laterality: Bilateral;    FORAMINOTOMY Bilateral 3/16/2022    Procedure: FORAMINOTOMY, SPINE, BILATERAL, L4-5 AND L5-S1;  Surgeon: Anil Hector MD;  Location: [facility] OR;  Service: Orthopedics;  Laterality: Bilateral;    LAMINOTOMY N/A 3/16/2022    Procedure: LAMINOTOMY S1;  Surgeon: Anil Hector MD;  Location: [facility] OR;  Service: Orthopedics;  Laterality: N/A;    LUMBAR LAMINECTOMY N/A 3/16/2022    Procedure: LAMINECTOMY, SPINE, LUMBAR, OPEN, L4 AND L5;  Surgeon: Anil Hector MD;  Location: [facility] OR;  Service: Orthopedics;  Laterality: N/A;  Will go to CCU Post-op per Dr. LUEVANO    PERIPHERALLY INSERTED CENTRAL CATHETER INSERTION N/A 3/24/2022    Procedure: INSERTION, PICC;  Surgeon: Kathryn Diagnostic Provider;  Location: [facility] OR;  Service: General;  Laterality: N/A;    TRANSURETHRAL RESECTION OF PROSTATE (TURP) USING THULIUM LASER N/A 5/17/2022    Procedure: TRANSURETHRAL W QUANTA LASER (TURP);  Surgeon: Rocky Rice MD;  Location: [facility];  Service: Urology;  Laterality: N/A;  To follow RM4     Family History   Problem Relation Age of Onset    Hypertension Mother     Heart disease Mother     Hypertension Father     Heart disease Father      Social History     Tobacco Use    Smoking status: Former    Smokeless tobacco: Former    Tobacco comments:     stopped 30 years ago   Substance Use Topics    Alcohol use: Not Currently     Alcohol/week: 5.0 standard drinks of alcohol     Types: 5 Cans of beer per week    Drug use: Never     Review of Systems   Constitutional:  Negative for fever.   HENT:  Negative for sore throat.    Respiratory:  Negative for shortness of breath.     Cardiovascular:  Negative for chest pain.   Gastrointestinal:  Negative for nausea.   Genitourinary:  Negative for dysuria.   Musculoskeletal:  Positive for arthralgias. Negative for back pain.   Skin:  Positive for pallor. Negative for rash.   Neurological:  Positive for headaches. Negative for weakness.   Hematological:  Does not bruise/bleed easily.   All other systems reviewed and are negative.      Physical Exam     Initial Vitals [08/25/23 1219]   BP Pulse Resp Temp SpO2   (!) 132/59 (!) 126 20 97.5 °F (36.4 °C) 99 %      MAP       --         Physical Exam    Nursing note and vitals reviewed.  Constitutional: He appears well-developed and well-nourished. He is not diaphoretic. No distress.   HENT:   Head: Normocephalic and atraumatic.   Eyes: Conjunctivae and EOM are normal. Pupils are equal, round, and reactive to light. Right eye exhibits no discharge. Left eye exhibits no discharge. No scleral icterus.   Neck: Neck supple. No JVD present.   Normal range of motion.  Cardiovascular:  Regular rhythm, normal heart sounds and intact distal pulses.           No murmur heard.  Tachycardic at 126 beats per minute   Pulmonary/Chest: Breath sounds normal. No stridor. No respiratory distress. He has no wheezes. He has no rhonchi. He has no rales. He exhibits no tenderness.   Abdominal: Abdomen is soft. Bowel sounds are normal. He exhibits no distension and no mass. There is no abdominal tenderness. There is no rebound and no guarding.   Musculoskeletal:         General: No tenderness or edema. Normal range of motion.      Cervical back: Normal range of motion and neck supple.     Neurological: He is alert and oriented to person, place, and time. He has normal strength. GCS score is 15. GCS eye subscore is 4. GCS verbal subscore is 5. GCS motor subscore is 6.   Skin: Skin is warm and dry. Capillary refill takes less than 2 seconds. There is pallor.         ED Course   Critical Care    Date/Time: 8/25/2023 2:00  PM    Performed by: Phoenix Santana MD  Authorized by: Phoenix Santana MD  Direct patient critical care time: 10 minutes  Additional history critical care time: 10 minutes  Ordering / reviewing critical care time: 10 minutes  Documentation critical care time: 10 minutes  Consulting other physicians critical care time: 10 minutes  Consult with family critical care time: 10 minutes  Total critical care time (exclusive of procedural time) : 60 minutes  Critical care was necessary to treat or prevent imminent or life-threatening deterioration of the following conditions: Anemia of chronic disease requiring packed red blood cell administration.  Critical care was time spent personally by me on the following activities: review of old charts, re-evaluation of patient's condition, pulse oximetry, ordering and review of radiographic studies, ordering and review of laboratory studies, ordering and performing treatments and interventions, obtaining history from patient or surrogate, examination of patient, evaluation of patient's response to treatment and development of treatment plan with patient or surrogate.        Labs Reviewed   CBC W/ AUTO DIFFERENTIAL - Abnormal; Notable for the following components:       Result Value    RBC 1.79 (*)     Hemoglobin 5.9 (*)     Hematocrit 16.4 (*)     MCH 33.0 (*)     RDW 16.6 (*)     Immature Granulocytes 0.7 (*)     Immature Grans (Abs) 0.07 (*)     All other components within normal limits    Narrative:        Hemoglobin and Hematocit critical result(s) called and verbal   readback obtained from Myriam Wagner RN by [facility] 08/25/2023 12:45   COMPREHENSIVE METABOLIC PANEL - Abnormal; Notable for the following components:    Sodium 131 (*)     Potassium 5.4 (*)     CO2 20 (*)     Glucose 150 (*)     BUN 65 (*)     Creatinine 1.9 (*)     Calcium 8.2 (*)     Total Protein 4.9 (*)     Albumin 2.1 (*)     Alkaline Phosphatase 259 (*)     eGFR 32.5 (*)     Anion Gap 7 (*)     All other  components within normal limits   MAGNESIUM   TYPE & SCREEN   PREPARE RBC SOFT     EKG Readings: (Independently Interpreted)   Initial Reading: No STEMI. Rhythm: Sinus Tachycardia. Heart Rate: 110. Ectopy: PACs. 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 ST Segments: Normal ST Segments. T Waves: Normal. Axis: Normal. Clinical Impression: Sinus Tachycardia and AV Block - 1st Degree with PACs       Imaging Results              CT Head Without Contrast (Final result)  Result time 08/25/23 13:21:33      Final result by Herminio Arreola MD (08/25/23 13:21:33)                   Impression:      1. No acute intracranial findings.  2. Somewhat mottled appearance to the cranial bones, possible metastases.      Electronically signed by: Herminio Arreola MD  Date:    08/25/2023  Time:    13:21               Narrative:    EXAMINATION:  CT HEAD WITHOUT CONTRAST    CLINICAL HISTORY:  Metastatic disease evaluation; headache.    TECHNIQUE:  Iterative reconstruction technique was performed.    CT/cardiac nuclear exam/s in prior 12 months:  1.    COMPARISON:  Head CT 08/25/2023.    FINDINGS:  No hydrocephalus. No mass or mass effect. No signs of acute hemorrhage.  Moderate volume loss and chronic microvascular white matter ischemic change.  Cranial bones demonstrate a somewhat mottled appearance, possible metastatic disease.                                       Medications   0.9%  NaCl infusion (for blood administration) (has no administration in time range)     Medical Decision Making  Amount and/or Complexity of Data Reviewed  Labs: ordered.  Radiology: ordered.    Risk  Prescription drug management.                          Medical Decision Making:   Differential Diagnosis:   Anemia related to chronic disease, metastatic disease to bone causing pain, electrolyte abnormality      Clinical Impression:   Final diagnoses:  [R00.0] Tachycardia  [D64.9] Anemia, unspecified type (Primary)        ED Disposition Condition    Admit Stable                 Phoenix Santana MD  08/25/23 5831

## 2023-08-25 NOTE — HPI
"Chief Complaint   Patient presents with    Back Pain    Headache       Pt to ED via EMS with complaints of "tailbone" pain and headache. Stated that HHN was at residence to change chavarria cath and sent pt to ED due to pain.       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  Patient states tailbone pain has been there for quite some time.  Home health was at his home to change out his Chavarria and sent him to the emergency department due to pain.  Chavarria was not changed out.  Denies any fever.     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  Was hospitalized on 07/09/2023 due to severe hypocalcemia, weakness, nausea, diarrhea. Brought to clinic in wheelchair by daughter.  Patient is hard of hearing.  Today patient states he is feeling quite poor and has felt bad for the last few weeks.  He states he is had nausea which is moderately controlled with Zofran.  Diarrhea does wax and wane.  Takes Imodium for diarrhea however then experiences constipation.  He is feeling quite fatigued and weak.  Overall feels he has a poor quality of life.  Does complain of headaches that wax and wane.  Denies fever or shortness of breath.  Does complain of decreased appetite with significant weight loss since last visit.    ED course: /59 mmHg, , RR 20, temp 97.5F, SpO2 99% on room air. Patient with hearing aids.   Admitted to medicine floor for PRBC blood transfusion. Hg/HCT 5.9/16.4 from 8.1/23.9 over 10 days ago. Patient examined in the room with daughter at bedside. After completion of almost one unit of PRBC transfused, patient endorses feeling a little better. Denies any shortness of breath. Currently patient and " his daughter are aware of the extent of his metastatic cancer. Patient states he would not want any form of heroic interventions of chest compressions for CPR and intubation and mechanical ventilation for respiratory support. He would like for his headaches to resolve and go home after current treatment for anemia.

## 2023-08-25 NOTE — ASSESSMENT & PLAN NOTE
BP Readings from Last 3 Encounters:   08/25/23 (!) 123/54   08/14/23 (!) 116/56   07/17/23 (!) 177/75   Denies symptoms.   Continue to monitor.

## 2023-08-25 NOTE — ASSESSMENT & PLAN NOTE
Associated weakness, likely secondary to anemia.   EKG sinus tachycardia, no ST-T wave elevation, first degree block with PVCs.  - correct anemia with 3U PRBC, continue to monitor

## 2023-08-25 NOTE — H&P
"[facility] Medicine  History & Physical    Patient Name: Maurice Roy  MRN: [id number]  Patient Class: IP- Inpatient  Admission Date: 8/25/2023  Attending Physician: Emigdio Salcido DO   Primary Care Provider: Marleni Castillo MD         Patient information was obtained from patient, relative(s) and ER records.     Subjective:     Principal Problem:Anemia    Chief Complaint:   Chief Complaint   Patient presents with    Back Pain    Headache     Pt to ED via EMS with complaints of "tailbone" pain and headache. Stated that HHN was at residence to change chavarria cath and sent pt to ED due to pain.         HPI:   Chief Complaint   Patient presents with    Back Pain    Headache       Pt to ED via EMS with complaints of "tailbone" pain and headache. Stated that HHN was at residence to change chavarria cath and sent pt to ED due to pain.       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  Patient states tailbone pain has been there for quite some time.  Home health was at his home to change out his Chavarria and sent him to the emergency department due to pain.  Chavarria was not changed out.  Denies any fever.     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  Was hospitalized on 07/09/2023 due to severe hypocalcemia, weakness, nausea, diarrhea. Brought to clinic in wheelchair by daughter.  Patient is hard of hearing.  Today patient states he is feeling quite poor and has felt bad for the last few weeks.  He states he is had nausea which is moderately controlled with Zofran.  Diarrhea does wax and wane.  Takes Imodium for diarrhea however then experiences constipation.  He is feeling quite " fatigued and weak.  Overall feels he has a poor quality of life.  Does complain of headaches that wax and wane.  Denies fever or shortness of breath.  Does complain of decreased appetite with significant weight loss since last visit.    ED course: /59 mmHg, , RR 20, temp 97.5F, SpO2 99% on room air. Patient with hearing aids.   Admitted to medicine floor for PRBC blood transfusion. Hg/HCT 5.9/16.4 from 8.1/23.9 over 10 days ago. Patient examined in the room with daughter at bedside. After completion of almost one unit of PRBC transfused, patient endorses feeling a little better. Denies any shortness of breath. Currently patient and his daughter are aware of the extent of his metastatic cancer. Patient states he would not want any form of heroic interventions of chest compressions for CPR and intubation and mechanical ventilation for respiratory support. He would like for his headaches to resolve and go home after current treatment for anemia.            Past Medical History:   Diagnosis Date    Arthritis     Bilateral hearing loss     Hearing aids    Cancer     prostate    Hypertension     Mobility impaired     recent back surgery, using walker / wheelchair       Past Surgical History:   Procedure Laterality Date    ANKLE SURGERY      right    CHOLECYSTECTOMY      COLONOSCOPY      CYSTOSCOPY N/A 5/17/2022    Procedure: CYSTOSCOPY;  Surgeon: Rocky Rice MD;  Location: [facility] OR;  Service: Urology;  Laterality: N/A;    FACETECTOMY OF VERTEBRA Bilateral 3/16/2022    Procedure: FACETECTOMY, PARTIAL MEDIAL, BILATERAL, L4-5 AND L5-S1;  Surgeon: Anil Hector MD;  Location: [facility] OR;  Service: Orthopedics;  Laterality: Bilateral;    FORAMINOTOMY Bilateral 3/16/2022    Procedure: FORAMINOTOMY, SPINE, BILATERAL, L4-5 AND L5-S1;  Surgeon: Anil Hector MD;  Location: [facility] OR;  Service: Orthopedics;  Laterality: Bilateral;    LAMINOTOMY N/A 3/16/2022    Procedure: LAMINOTOMY S1;  Surgeon: Anil  MD Tawny;  Location: [facility] OR;  Service: Orthopedics;  Laterality: N/A;    LUMBAR LAMINECTOMY N/A 3/16/2022    Procedure: LAMINECTOMY, SPINE, LUMBAR, OPEN, L4 AND L5;  Surgeon: Anil Hector MD;  Location: [facility] OR;  Service: Orthopedics;  Laterality: N/A;  Will go to CCU Post-op per Dr. LUEVANO    PERIPHERALLY INSERTED CENTRAL CATHETER INSERTION N/A 3/24/2022    Procedure: INSERTION, PICC;  Surgeon: Kathryn Diagnostic Provider;  Location: [facility] OR;  Service: General;  Laterality: N/A;    TRANSURETHRAL RESECTION OF PROSTATE (TURP) USING THULIUM LASER N/A 5/17/2022    Procedure: TRANSURETHRAL W QUANTA LASER (TURP);  Surgeon: Rocky Rice MD;  Location: [facility] OR;  Service: Urology;  Laterality: N/A;  To follow RM4       Review of patient's allergies indicates:  No Known Allergies    No current facility-administered medications on file prior to encounter.     Current Outpatient Medications on File Prior to Encounter   Medication Sig    acetaminophen (TYLENOL ARTHRITIS PAIN) 650 MG TbSR Take 1,300 mg by mouth 2 (two) times a day.    amLODIPine (NORVASC) 5 MG tablet Take 1 tablet (5 mg total) by mouth 2 (two) times daily.    aspirin (ECOTRIN) 81 MG EC tablet Take 81 mg by mouth once daily.    cetirizine (ZYRTEC) 10 MG tablet Take 5 mg by mouth every evening.    enzalutamide 80 mg Tab Take 160 mg by mouth once daily.    fenofibrate (TRICOR) 145 MG tablet Take 145 mg by mouth every evening.    furosemide (LASIX) 20 MG tablet     metoprolol succinate (TOPROL-XL) 25 MG 24 hr tablet     mirtazapine (REMERON) 7.5 MG Tab Take 7.5 mg by mouth every evening.    tadalafiL (CIALIS) 5 MG tablet Take 5 mg by mouth every evening.    tamsulosin (FLOMAX) 0.4 mg Cap Take by mouth 2 (two) times a day.    calcium carbonate 1250 MG capsule Take 3 capsules (1,500 mg total) by mouth 2 (two) times daily with meals.    cholecalciferol, vitamin D3, (VITAMIN D3) 25 mcg (1,000 unit) capsule Take 1,000 Units by mouth once daily.     HYDROcodone-acetaminophen (NORCO)  mg per tablet     levalbuterol (XOPENEX HFA) 45 mcg/actuation inhaler Inhale 1-2 puffs into the lungs every 4 (four) hours as needed for Wheezing. Rescue    lisinopriL 10 MG tablet Take 1 tablet (10 mg total) by mouth once daily.    nebivoloL (BYSTOLIC) 20 mg Tab Take 10 mg by mouth once daily.     Family History       Problem Relation (Age of Onset)    Heart disease Mother, Father    Hypertension Mother, Father          Tobacco Use    Smoking status: Former    Smokeless tobacco: Former    Tobacco comments:     stopped 30 years ago   Substance and Sexual Activity    Alcohol use: Not Currently     Alcohol/week: 5.0 standard drinks of alcohol     Types: 5 Cans of beer per week    Drug use: Never    Sexual activity: Not Currently     Review of Systems   Constitutional:  Positive for activity change, appetite change and fatigue. Negative for chills, diaphoresis and fever.   HENT:  Negative for sinus pain and sore throat.    Respiratory:  Negative for cough, chest tightness, shortness of breath, wheezing and stridor.    Cardiovascular:  Negative for chest pain, palpitations and leg swelling.   Gastrointestinal:  Positive for constipation and diarrhea. Negative for abdominal distention, abdominal pain and vomiting.   Genitourinary:  Negative for dysuria.   Musculoskeletal:  Positive for arthralgias and back pain. Negative for joint swelling, myalgias and neck pain.   Skin:  Positive for pallor.   Neurological:  Positive for weakness, light-headedness and headaches. Negative for dizziness, tremors, seizures and syncope.   Psychiatric/Behavioral:  Negative for agitation, behavioral problems, confusion and dysphoric mood.      Objective:     Vital Signs (Most Recent):  Temp: 98.2 °F (36.8 °C) (08/25/23 1434)  Pulse: (!) 120 (08/25/23 1434)  Resp: 16 (08/25/23 1434)  BP: (!) 123/54 (08/25/23 1434)  SpO2: 100 % (08/25/23 1434) Vital Signs (24h Range):  Temp:  [97.5 °F (36.4  °C)-98.2 °F (36.8 °C)] 98.2 °F (36.8 °C)  Pulse:  [110-126] 120  Resp:  [16-20] 16  SpO2:  [99 %-100 %] 100 %  BP: (106-132)/(46-59) 123/54     Weight: 69.9 kg (154 lb)  Body mass index is 24.12 kg/m².     Physical Exam  Vitals and nursing note reviewed.   Constitutional:       Appearance: Normal appearance. He is ill-appearing. He is not toxic-appearing or diaphoretic.   HENT:      Head: Normocephalic and atraumatic.      Right Ear: External ear normal.      Left Ear: External ear normal.      Ears:      Comments: Hearing aid in place     Nose: Nose normal.      Mouth/Throat:      Mouth: Mucous membranes are dry.      Pharynx: Oropharynx is clear. No oropharyngeal exudate or posterior oropharyngeal erythema.   Eyes:      Extraocular Movements: Extraocular movements intact.      Conjunctiva/sclera: Conjunctivae normal.   Cardiovascular:      Rate and Rhythm: Regular rhythm. Tachycardia present.      Pulses: Normal pulses.      Heart sounds: Normal heart sounds. No murmur heard.  Pulmonary:      Effort: Pulmonary effort is normal. No respiratory distress.      Breath sounds: Normal breath sounds. No wheezing or rales.   Abdominal:      General: Abdomen is flat.      Palpations: Abdomen is soft. There is no mass.   Musculoskeletal:         General: Normal range of motion.      Cervical back: Normal range of motion and neck supple. No rigidity or tenderness.      Right lower leg: No edema.      Left lower leg: No edema.   Skin:     General: Skin is warm and dry.      Coloration: Skin is pale.      Findings: Bruising present. No lesion.   Neurological:      General: No focal deficit present.      Mental Status: He is alert and oriented to person, place, and time.      Motor: No weakness.      Gait: Gait normal.   Psychiatric:         Mood and Affect: Mood normal.         Behavior: Behavior normal.         Thought Content: Thought content normal.         Judgment: Judgment normal.             Significant Labs: All  pertinent labs within the past 24 hours have been reviewed.  Bilirubin:   Recent Labs   Lab 08/11/23  1005 08/14/23  1227 08/17/23  1323 08/25/23  1234   BILITOT 0.3 0.5 0.4 0.3     BMP:   Recent Labs   Lab 08/25/23  1234   *   *   K 5.4*      CO2 20*   BUN 65*   CREATININE 1.9*   CALCIUM 8.2*   MG 1.9     CBC:   Recent Labs   Lab 08/25/23  1234   WBC 9.39   HGB 5.9*   HCT 16.4*        CMP:   Recent Labs   Lab 08/25/23  1234   *   K 5.4*      CO2 20*   *   BUN 65*   CREATININE 1.9*   CALCIUM 8.2*   PROT 4.9*   ALBUMIN 2.1*   BILITOT 0.3   ALKPHOS 259*   AST 15   ALT 10   ANIONGAP 7*     Magnesium:   Recent Labs   Lab 08/25/23  1234   MG 1.9     TSH:   Recent Labs   Lab 07/11/23  0942   TSH 1.90     Urine Studies:   Recent Labs   Lab 08/25/23  1529   COLORU Yellow   APPEARANCEUA Clear   PHUR 6.0   SPECGRAV 1.015   PROTEINUA Trace*   GLUCUA Negative   KETONESU Negative   BILIRUBINUA Negative   OCCULTUA 1+*   NITRITE Negative   UROBILINOGEN Negative   LEUKOCYTESUR 2+*   RBCUA 17*   WBCUA 51*   BACTERIA Negative   SQUAMEPITHEL 10   HYALINECASTS 0     Significant Imaging: I have reviewed all pertinent imaging results/findings within the past 24 hours.    Assessment/Plan:     * Anemia  Weakness, debility and tachycardia >120s bpm.  After one unit, patient feeling better, HR remained elevated.   - continue with PRBC transfusion, 3U total  - f/u Hg/HCT      Tachycardia  Associated weakness, likely secondary to anemia.   EKG sinus tachycardia, no ST-T wave elevation, first degree block with PVCs.  - correct anemia with 3U PRBC, continue to monitor      Hyperkalemia  Lokelma 5mg, f/u BMP.   - continuous telemetry      Inadequate dietary energy intake  Continue with home nutritional supplementation ENSURE BID.       Hypertension  BP Readings from Last 3 Encounters:   08/25/23 (!) 123/54   08/14/23 (!) 116/56   07/17/23 (!) 177/75   Denies symptoms.   Continue to monitor.            VTE Risk Mitigation (From admission, onward)         Ordered     IP VTE HIGH RISK PATIENT  Once         08/25/23 1529     Place sequential compression device  Until discontinued         08/25/23 1529     Place ESTEBAN hose  Until discontinued         08/25/23 1529                           Emigdio Salcido DO  Department of Hospital Medicine  [facility]

## 2023-08-25 NOTE — ASSESSMENT & PLAN NOTE
Weakness, debility and tachycardia >120s bpm.  After one unit, patient feeling better, HR remained elevated.   - continue with PRBC transfusion, 3U total  - f/u Hg/HCT

## 2023-08-26 NOTE — PROGRESS NOTES
"[facility] Medicine  Progress Note    Patient Name: Maurice Roy  MRN: [id number]  Patient Class: OP- Observation   Admission Date: 8/25/2023  Length of Stay: 1 days  Attending Physician: Jose Norton Jr., MD  Primary Care Provider: Marleni Castillo MD        Subjective:     Principal Problem:Anemia        HPI:  Chief Complaint   Patient presents with    Back Pain    Headache       Pt to ED via EMS with complaints of "tailbone" pain and headache. Stated that HHN was at residence to change chavarria cath and sent pt to ED due to pain.       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  Patient states tailbone pain has been there for quite some time.  Home health was at his home to change out his Chavarria and sent him to the emergency department due to pain.  Chavarria was not changed out.  Denies any fever.     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  Was hospitalized on 07/09/2023 due to severe hypocalcemia, weakness, nausea, diarrhea. Brought to clinic in wheelchair by daughter.  Patient is hard of hearing.  Today patient states he is feeling quite poor and has felt bad for the last few weeks.  He states he is had nausea which is moderately controlled with Zofran.  Diarrhea does wax and wane.  Takes Imodium for diarrhea however then experiences constipation.  He is feeling quite fatigued and weak.  Overall feels he has a poor quality of life.  Does complain of headaches that wax and wane.  Denies fever or shortness of breath.  Does complain of decreased appetite with significant weight loss since last visit.    ED course: /59 mmHg, , RR 20, temp 97.5F, SpO2 99% on room " air. Patient with hearing aids.   Admitted to medicine floor for PRBC blood transfusion. Hg/HCT 5.9/16.4 from 8.1/23.9 over 10 days ago. Patient examined in the room with daughter at bedside. After completion of almost one unit of PRBC transfused, patient endorses feeling a little better. Denies any shortness of breath. Currently patient and his daughter are aware of the extent of his metastatic cancer. Patient states he would not want any form of heroic interventions of chest compressions for CPR and intubation and mechanical ventilation for respiratory support. He would like for his headaches to resolve and go home after current treatment for anemia.            Overview/Hospital Course:  08/26/2023:  Patient has responded nicely to blood products and IV fluids.  Family request transition back to home with hospice services.  They have chosen [facility] for that care.  Will coordinate and discharge once accommodations could be made for the patient and the family at his home.      Past Medical History:   Diagnosis Date    Arthritis     Bilateral hearing loss     Hearing aids    Cancer     prostate    Hypertension     Mobility impaired     recent back surgery, using walker / wheelchair       Past Surgical History:   Procedure Laterality Date    ANKLE SURGERY      right    CHOLECYSTECTOMY      COLONOSCOPY      CYSTOSCOPY N/A 5/17/2022    Procedure: CYSTOSCOPY;  Surgeon: Rocky Rice MD;  Location: [facility] OR;  Service: Urology;  Laterality: N/A;    FACETECTOMY OF VERTEBRA Bilateral 3/16/2022    Procedure: FACETECTOMY, PARTIAL MEDIAL, BILATERAL, L4-5 AND L5-S1;  Surgeon: Anil Hector MD;  Location: [facility] OR;  Service: Orthopedics;  Laterality: Bilateral;    FORAMINOTOMY Bilateral 3/16/2022    Procedure: FORAMINOTOMY, SPINE, BILATERAL, L4-5 AND L5-S1;  Surgeon: Anil Hector MD;  Location: [facility] OR;  Service: Orthopedics;  Laterality: Bilateral;    LAMINOTOMY N/A 3/16/2022    Procedure: LAMINOTOMY  S1;  Surgeon: Anil Hector MD;  Location: [facility] OR;  Service: Orthopedics;  Laterality: N/A;    LUMBAR LAMINECTOMY N/A 3/16/2022    Procedure: LAMINECTOMY, SPINE, LUMBAR, OPEN, L4 AND L5;  Surgeon: Anil Hector MD;  Location: [facility] OR;  Service: Orthopedics;  Laterality: N/A;  Will go to CCU Post-op per Dr. LUEVANO    PERIPHERALLY INSERTED CENTRAL CATHETER INSERTION N/A 3/24/2022    Procedure: INSERTION, PICC;  Surgeon: Kathryn Diagnostic Provider;  Location: [facility] OR;  Service: General;  Laterality: N/A;    TRANSURETHRAL RESECTION OF PROSTATE (TURP) USING THULIUM LASER N/A 5/17/2022    Procedure: TRANSURETHRAL W QUANTA LASER (TURP);  Surgeon: Rocky Rice MD;  Location: [facility] OR;  Service: Urology;  Laterality: N/A;  To follow [facility]       Review of patient's allergies indicates:  No Known Allergies    No current facility-administered medications on file prior to encounter.     Current Outpatient Medications on File Prior to Encounter   Medication Sig    acetaminophen (TYLENOL ARTHRITIS PAIN) 650 MG TbSR Take 1,300 mg by mouth 2 (two) times a day.    amLODIPine (NORVASC) 5 MG tablet Take 1 tablet (5 mg total) by mouth 2 (two) times daily.    aspirin (ECOTRIN) 81 MG EC tablet Take 81 mg by mouth once daily.    cetirizine (ZYRTEC) 10 MG tablet Take 5 mg by mouth every evening.    enzalutamide 80 mg Tab Take 160 mg by mouth once daily.    fenofibrate (TRICOR) 145 MG tablet Take 145 mg by mouth every evening.    furosemide (LASIX) 20 MG tablet     metoprolol succinate (TOPROL-XL) 25 MG 24 hr tablet     mirtazapine (REMERON) 7.5 MG Tab Take 7.5 mg by mouth every evening.    tadalafiL (CIALIS) 5 MG tablet Take 5 mg by mouth every evening.    tamsulosin (FLOMAX) 0.4 mg Cap Take by mouth 2 (two) times a day.    calcium carbonate 1250 MG capsule Take 3 capsules (1,500 mg total) by mouth 2 (two) times daily with meals.    cholecalciferol, vitamin D3, (VITAMIN D3) 25 mcg (1,000 unit) capsule Take 1,000 Units by  mouth once daily.    HYDROcodone-acetaminophen (NORCO)  mg per tablet     levalbuterol (XOPENEX HFA) 45 mcg/actuation inhaler Inhale 1-2 puffs into the lungs every 4 (four) hours as needed for Wheezing. Rescue    lisinopriL 10 MG tablet Take 1 tablet (10 mg total) by mouth once daily.    nebivoloL (BYSTOLIC) 20 mg Tab Take 10 mg by mouth once daily.     Family History       Problem Relation (Age of Onset)    Heart disease Mother, Father    Hypertension Mother, Father          Tobacco Use    Smoking status: Former    Smokeless tobacco: Former    Tobacco comments:     stopped 30 years ago   Substance and Sexual Activity    Alcohol use: Not Currently     Alcohol/week: 5.0 standard drinks of alcohol     Types: 5 Cans of beer per week    Drug use: Never    Sexual activity: Not Currently     Review of Systems   Constitutional:  Positive for activity change, appetite change and fatigue. Negative for chills, diaphoresis and fever.   HENT:  Negative for sinus pain and sore throat.    Respiratory:  Negative for cough, chest tightness, shortness of breath, wheezing and stridor.    Cardiovascular:  Negative for chest pain, palpitations and leg swelling.   Gastrointestinal:  Positive for constipation and diarrhea. Negative for abdominal distention, abdominal pain and vomiting.   Genitourinary:  Negative for dysuria.   Musculoskeletal:  Positive for arthralgias and back pain. Negative for joint swelling, myalgias and neck pain.   Skin:  Positive for pallor.   Neurological:  Positive for weakness, light-headedness and headaches. Negative for dizziness, tremors, seizures and syncope.   Psychiatric/Behavioral:  Negative for agitation, behavioral problems, confusion and dysphoric mood.      Objective:     Vital Signs (Most Recent):  Temp: 98.2 °F (36.8 °C) (08/26/23 0830)  Pulse: 108 (08/26/23 0830)  Resp: 18 (08/26/23 1008)  BP: (!) 168/79 (08/26/23 0830)  SpO2: (!) 94 % (08/26/23 0830) Vital Signs (24h  Range):  Temp:  [96.9 °F (36.1 °C)-99.9 °F (37.7 °C)] 98.2 °F (36.8 °C)  Pulse:  [] 108  Resp:  [16-20] 18  SpO2:  [94 %-100 %] 94 %  BP: (106-168)/(46-79) 168/79     Weight: 70.3 kg (155 lb)  Body mass index is 24.28 kg/m².     Physical Exam  Vitals and nursing note reviewed.   Constitutional:       Appearance: Normal appearance. He is ill-appearing. He is not toxic-appearing or diaphoretic.   HENT:      Head: Normocephalic and atraumatic.      Right Ear: External ear normal.      Left Ear: External ear normal.      Ears:      Comments: Hearing aid in place     Nose: Nose normal.      Mouth/Throat:      Mouth: Mucous membranes are dry.      Pharynx: Oropharynx is clear. No oropharyngeal exudate or posterior oropharyngeal erythema.   Eyes:      Extraocular Movements: Extraocular movements intact.      Conjunctiva/sclera: Conjunctivae normal.   Cardiovascular:      Rate and Rhythm: Regular rhythm. Tachycardia present.      Pulses: Normal pulses.      Heart sounds: Normal heart sounds. No murmur heard.  Pulmonary:      Effort: Pulmonary effort is normal. No respiratory distress.      Breath sounds: Normal breath sounds. No wheezing or rales.   Abdominal:      General: Abdomen is flat.      Palpations: Abdomen is soft. There is no mass.   Musculoskeletal:         General: Normal range of motion.      Cervical back: Normal range of motion and neck supple. No rigidity or tenderness.      Right lower leg: No edema.      Left lower leg: No edema.   Skin:     General: Skin is warm and dry.      Coloration: Skin is pale.      Findings: Bruising present. No lesion.   Neurological:      General: No focal deficit present.      Mental Status: He is alert and oriented to person, place, and time.      Motor: No weakness.      Gait: Gait normal.   Psychiatric:         Mood and Affect: Mood normal.         Thought Content: Thought content normal.         Judgment: Judgment normal.      Comments: demented              Significant Labs: All pertinent labs within the past 24 hours have been reviewed.  Bilirubin:   Recent Labs   Lab 08/11/23  1005 08/14/23  1227 08/17/23  1323 08/25/23  1234 08/26/23  0616   BILITOT 0.3 0.5 0.4 0.3 0.4       BMP:   Recent Labs   Lab 08/25/23  1234 08/26/23  0616   * 99   * 134*   K 5.4* 4.5    107   CO2 20* 24   BUN 65* 70*   CREATININE 1.9* 1.5*   CALCIUM 8.2* 7.4*   MG 1.9  --        CBC:   Recent Labs   Lab 08/25/23  1234 08/26/23  0616   WBC 9.39 9.95   HGB 5.9* 9.2*   HCT 16.4* 25.5*    261       CMP:   Recent Labs   Lab 08/25/23  1234 08/26/23  0616   * 134*   K 5.4* 4.5    107   CO2 20* 24   * 99   BUN 65* 70*   CREATININE 1.9* 1.5*   CALCIUM 8.2* 7.4*   PROT 4.9* 4.3*   ALBUMIN 2.1* 1.9*   BILITOT 0.3 0.4   ALKPHOS 259* 197*   AST 15 18   ALT 10 7*   ANIONGAP 7* 3*       Magnesium:   Recent Labs   Lab 08/25/23  1234   MG 1.9       TSH:   Recent Labs   Lab 07/11/23  0942   TSH 1.90       Urine Studies:   Recent Labs   Lab 08/25/23  1529   COLORU Yellow   APPEARANCEUA Clear   PHUR 6.0   SPECGRAV 1.015   PROTEINUA Trace*   GLUCUA Negative   KETONESU Negative   BILIRUBINUA Negative   OCCULTUA 1+*   NITRITE Negative   UROBILINOGEN Negative   LEUKOCYTESUR 2+*   RBCUA 17*   WBCUA 51*   BACTERIA Negative   SQUAMEPITHEL 10   HYALINECASTS 0       Significant Imaging: I have reviewed all pertinent imaging results/findings within the past 24 hours.      Assessment/Plan:      * Anemia  Weakness, debility and tachycardia >120s bpm.  After one unit, patient feeling better, HR remained elevated.   - continue with PRBC transfusion, 3U total  - f/u Hg/HCT    Recent Labs   Lab 08/14/23  1227 08/25/23  1234 08/26/23  0616   Hemoglobin 8.1 L 5.9 LL 9.2 L           Tachycardia  Associated weakness, likely secondary to anemia.   EKG sinus tachycardia, no ST-T wave elevation, first degree block with PVCs.  - correct anemia with 3U PRBC, continue to  monitor      Hyperkalemia  Lokelma 5mg, f/u BMP.   - continuous telemetry      TOBIAS (acute kidney injury)  Patient with acute kidney injury/acute renal failure likely due to pre-renal azotemia due to dehydration TOBIAS is currently improving. Baseline creatinine unknown - Labs reviewed- Renal function/electrolytes with Estimated Creatinine Clearance: 28.8 mL/min (A) (based on SCr of 1.5 mg/dL (H)). according to latest data. Monitor urine output and serial BMP and adjust therapy as needed. Avoid nephrotoxins and renally dose meds for GFR listed above.    Lab Results   Component Value Date    CREATININE 1.5 (H) 08/26/2023    CREATININE 1.9 (H) 08/25/2023    CREATININE 1.7 (H) 08/17/2023          Inadequate dietary energy intake  Continue with home nutritional supplementation ENSURE BID.       Hypertension  BP Readings from Last 3 Encounters:   08/26/23 (!) 168/79   08/14/23 (!) 116/56   07/17/23 (!) 177/75   Denies symptoms.   Continue to monitor.           VTE Risk Mitigation (From admission, onward)         Ordered     IP VTE HIGH RISK PATIENT  Once         08/25/23 1529     Place sequential compression device  Until discontinued         08/25/23 1529     Place ESTEBAN hose  Until discontinued         08/25/23 1529                Discharge Planning   MARCOS:      Code Status: DNR   Is the patient medically ready for discharge?:     Reason for patient still in hospital (select all that apply): Treatment                     Jose Norton Jr, MD  Department of Hospital Medicine   [facility]

## 2023-08-26 NOTE — SUBJECTIVE & OBJECTIVE
Past Medical History:   Diagnosis Date    Arthritis     Bilateral hearing loss     Hearing aids    Cancer     prostate    Hypertension     Mobility impaired     recent back surgery, using walker / wheelchair       Past Surgical History:   Procedure Laterality Date    ANKLE SURGERY      right    CHOLECYSTECTOMY      COLONOSCOPY      CYSTOSCOPY N/A 5/17/2022    Procedure: CYSTOSCOPY;  Surgeon: Rocky Rice MD;  Location: [facility] OR;  Service: Urology;  Laterality: N/A;    FACETECTOMY OF VERTEBRA Bilateral 3/16/2022    Procedure: FACETECTOMY, PARTIAL MEDIAL, BILATERAL, L4-5 AND L5-S1;  Surgeon: Anil Hector MD;  Location: [facility] OR;  Service: Orthopedics;  Laterality: Bilateral;    FORAMINOTOMY Bilateral 3/16/2022    Procedure: FORAMINOTOMY, SPINE, BILATERAL, L4-5 AND L5-S1;  Surgeon: Anil Hector MD;  Location: [facility] OR;  Service: Orthopedics;  Laterality: Bilateral;    LAMINOTOMY N/A 3/16/2022    Procedure: LAMINOTOMY S1;  Surgeon: Anil Hector MD;  Location: [facility] OR;  Service: Orthopedics;  Laterality: N/A;    LUMBAR LAMINECTOMY N/A 3/16/2022    Procedure: LAMINECTOMY, SPINE, LUMBAR, OPEN, L4 AND L5;  Surgeon: Anil Hector MD;  Location: [facility] OR;  Service: Orthopedics;  Laterality: N/A;  Will go to CCU Post-op per Dr. LUEVANO    PERIPHERALLY INSERTED CENTRAL CATHETER INSERTION N/A 3/24/2022    Procedure: INSERTION, PICC;  Surgeon: Kathryn Diagnostic Provider;  Location: [facility] OR;  Service: General;  Laterality: N/A;    TRANSURETHRAL RESECTION OF PROSTATE (TURP) USING THULIUM LASER N/A 5/17/2022    Procedure: TRANSURETHRAL W QUANTA LASER (TURP);  Surgeon: Rocky Rice MD;  Location: [facility] OR;  Service: Urology;  Laterality: N/A;  To follow [facility]       Review of patient's allergies indicates:  No Known Allergies    No current facility-administered medications on file prior to encounter.     Current Outpatient Medications on File Prior to Encounter   Medication Sig    acetaminophen (TYLENOL ARTHRITIS PAIN) 650 MG  TbSR Take 1,300 mg by mouth 2 (two) times a day.    amLODIPine (NORVASC) 5 MG tablet Take 1 tablet (5 mg total) by mouth 2 (two) times daily.    aspirin (ECOTRIN) 81 MG EC tablet Take 81 mg by mouth once daily.    cetirizine (ZYRTEC) 10 MG tablet Take 5 mg by mouth every evening.    enzalutamide 80 mg Tab Take 160 mg by mouth once daily.    fenofibrate (TRICOR) 145 MG tablet Take 145 mg by mouth every evening.    furosemide (LASIX) 20 MG tablet     metoprolol succinate (TOPROL-XL) 25 MG 24 hr tablet     mirtazapine (REMERON) 7.5 MG Tab Take 7.5 mg by mouth every evening.    tadalafiL (CIALIS) 5 MG tablet Take 5 mg by mouth every evening.    tamsulosin (FLOMAX) 0.4 mg Cap Take by mouth 2 (two) times a day.    calcium carbonate 1250 MG capsule Take 3 capsules (1,500 mg total) by mouth 2 (two) times daily with meals.    cholecalciferol, vitamin D3, (VITAMIN D3) 25 mcg (1,000 unit) capsule Take 1,000 Units by mouth once daily.    HYDROcodone-acetaminophen (NORCO)  mg per tablet     levalbuterol (XOPENEX HFA) 45 mcg/actuation inhaler Inhale 1-2 puffs into the lungs every 4 (four) hours as needed for Wheezing. Rescue    lisinopriL 10 MG tablet Take 1 tablet (10 mg total) by mouth once daily.    nebivoloL (BYSTOLIC) 20 mg Tab Take 10 mg by mouth once daily.     Family History       Problem Relation (Age of Onset)    Heart disease Mother, Father    Hypertension Mother, Father          Tobacco Use    Smoking status: Former    Smokeless tobacco: Former    Tobacco comments:     stopped 30 years ago   Substance and Sexual Activity    Alcohol use: Not Currently     Alcohol/week: 5.0 standard drinks of alcohol     Types: 5 Cans of beer per week    Drug use: Never    Sexual activity: Not Currently     Review of Systems   Constitutional:  Positive for activity change, appetite change and fatigue. Negative for chills, diaphoresis and fever.   HENT:  Negative for sinus pain and sore throat.    Respiratory:  Negative for cough,  chest tightness, shortness of breath, wheezing and stridor.    Cardiovascular:  Negative for chest pain, palpitations and leg swelling.   Gastrointestinal:  Positive for constipation and diarrhea. Negative for abdominal distention, abdominal pain and vomiting.   Genitourinary:  Negative for dysuria.   Musculoskeletal:  Positive for arthralgias and back pain. Negative for joint swelling, myalgias and neck pain.   Skin:  Positive for pallor.   Neurological:  Positive for weakness, light-headedness and headaches. Negative for dizziness, tremors, seizures and syncope.   Psychiatric/Behavioral:  Negative for agitation, behavioral problems, confusion and dysphoric mood.      Objective:     Vital Signs (Most Recent):  Temp: 98.2 °F (36.8 °C) (08/26/23 0830)  Pulse: 108 (08/26/23 0830)  Resp: 18 (08/26/23 1008)  BP: (!) 168/79 (08/26/23 0830)  SpO2: (!) 94 % (08/26/23 0830) Vital Signs (24h Range):  Temp:  [96.9 °F (36.1 °C)-99.9 °F (37.7 °C)] 98.2 °F (36.8 °C)  Pulse:  [] 108  Resp:  [16-20] 18  SpO2:  [94 %-100 %] 94 %  BP: (106-168)/(46-79) 168/79     Weight: 70.3 kg (155 lb)  Body mass index is 24.28 kg/m².     Physical Exam  Vitals and nursing note reviewed.   Constitutional:       Appearance: Normal appearance. He is ill-appearing. He is not toxic-appearing or diaphoretic.   HENT:      Head: Normocephalic and atraumatic.      Right Ear: External ear normal.      Left Ear: External ear normal.      Ears:      Comments: Hearing aid in place     Nose: Nose normal.      Mouth/Throat:      Mouth: Mucous membranes are dry.      Pharynx: Oropharynx is clear. No oropharyngeal exudate or posterior oropharyngeal erythema.   Eyes:      Extraocular Movements: Extraocular movements intact.      Conjunctiva/sclera: Conjunctivae normal.   Cardiovascular:      Rate and Rhythm: Regular rhythm. Tachycardia present.      Pulses: Normal pulses.      Heart sounds: Normal heart sounds. No murmur heard.  Pulmonary:      Effort:  Pulmonary effort is normal. No respiratory distress.      Breath sounds: Normal breath sounds. No wheezing or rales.   Abdominal:      General: Abdomen is flat.      Palpations: Abdomen is soft. There is no mass.   Musculoskeletal:         General: Normal range of motion.      Cervical back: Normal range of motion and neck supple. No rigidity or tenderness.      Right lower leg: No edema.      Left lower leg: No edema.   Skin:     General: Skin is warm and dry.      Coloration: Skin is pale.      Findings: Bruising present. No lesion.   Neurological:      General: No focal deficit present.      Mental Status: He is alert and oriented to person, place, and time.      Motor: No weakness.      Gait: Gait normal.   Psychiatric:         Mood and Affect: Mood normal.         Thought Content: Thought content normal.         Judgment: Judgment normal.      Comments: demented             Significant Labs: All pertinent labs within the past 24 hours have been reviewed.  Bilirubin:   Recent Labs   Lab 08/11/23  1005 08/14/23  1227 08/17/23  1323 08/25/23  1234 08/26/23  0616   BILITOT 0.3 0.5 0.4 0.3 0.4       BMP:   Recent Labs   Lab 08/25/23  1234 08/26/23  0616   * 99   * 134*   K 5.4* 4.5    107   CO2 20* 24   BUN 65* 70*   CREATININE 1.9* 1.5*   CALCIUM 8.2* 7.4*   MG 1.9  --        CBC:   Recent Labs   Lab 08/25/23  1234 08/26/23  0616   WBC 9.39 9.95   HGB 5.9* 9.2*   HCT 16.4* 25.5*    261       CMP:   Recent Labs   Lab 08/25/23  1234 08/26/23  0616   * 134*   K 5.4* 4.5    107   CO2 20* 24   * 99   BUN 65* 70*   CREATININE 1.9* 1.5*   CALCIUM 8.2* 7.4*   PROT 4.9* 4.3*   ALBUMIN 2.1* 1.9*   BILITOT 0.3 0.4   ALKPHOS 259* 197*   AST 15 18   ALT 10 7*   ANIONGAP 7* 3*       Magnesium:   Recent Labs   Lab 08/25/23  1234   MG 1.9       TSH:   Recent Labs   Lab 07/11/23  0942   TSH 1.90       Urine Studies:   Recent Labs   Lab 08/25/23  1529   COLORU Yellow   APPEARANCEUA Clear    PHUR 6.0   SPECGRAV 1.015   PROTEINUA Trace*   GLUCUA Negative   KETONESU Negative   BILIRUBINUA Negative   OCCULTUA 1+*   NITRITE Negative   UROBILINOGEN Negative   LEUKOCYTESUR 2+*   RBCUA 17*   WBCUA 51*   BACTERIA Negative   SQUAMEPITHEL 10   HYALINECASTS 0       Significant Imaging: I have reviewed all pertinent imaging results/findings within the past 24 hours.

## 2023-08-26 NOTE — PLAN OF CARE
08/26/23 1247   GARRETT Message   Medicare Outpatient and Observation Notification regarding financial responsibility Explained to patient/caregiver;Other (comments)  (Patient asleep.)   Date GARRETT was signed 08/26/23   Time GARRETT was signed 1230     Reached out to daughter per phone.  Explained observation status and GARRETT.  Gives verbal assent and voices understanding.  Garrett has been reviewed with her.

## 2023-08-26 NOTE — PLAN OF CARE
Reached out to [facility], Spoke to Marsha and gave verbal referral.  Faxed per [facility] and [facility] to [facility].  Gave contact information for family and [facility].

## 2023-08-26 NOTE — PLAN OF CARE
[place] - [facility] Surg  Initial Discharge Assessment       Primary Care Provider: Marleni Castillo MD    Admission Diagnosis: Tachycardia [R00.0]  Anemia, unspecified type [D64.9]    Admission Date: 8/25/2023  Expected Discharge Date:     Transition of Care Barriers: None    Payor: MEDICARE / Plan: MEDICARE PART A & B / Product Type: Government /     Extended Emergency Contact Information  Primary Emergency Contact: Delmy Jacobs  Mobile Phone: [phone]  Relation: Daughter  Secondary Emergency Contact: CAROL JACOBS  Mobile Phone: [phone]  Relation: Grandchild  Preferred language: English   needed? No    Discharge Plan A: Home with family, Hospice/home  Discharge Plan B: Home with family, Hospice/home      [facility] - [place], [place] - [address].  [address]  [facility] [address]  Phone: [phone] Fax: [phone]    [id number] [id number] [place] [address]  [address] [facility]  [address] [place] [address]  Phone: [phone] Fax: [phone]      Initial Assessment (most recent)       Adult Discharge Assessment - 08/26/23 1224          Discharge Assessment    Assessment Type Discharge Planning Assessment     Confirmed/corrected address, phone number and insurance Yes     Source of Information family   spoke with rica Jacobs, [phone]    If unable to respond/provide information was family/caregiver contacted? Yes     Contact Name/Number Delmy Jacobs [phone]     Does patient/caregiver understand observation status Yes   explained to keysha, patient is sleeping    Communicated MARCOS with patient/caregiver Date not available/Unable to determine     Reason For Admission anemia     People in Home child(bairon), adult     Facility Arrived From: home Daughter Jefry lives across the street     Do you expect to return to your current living situation? Yes     Do you have help at home or someone to help you manage your care  at home? Yes     Who are your caregiver(s) and their phone number(s)? jayshree with nursing Care, daughter and son state no need to resume this     Prior to hospitilization cognitive status: Alert/Oriented     Current cognitive status: --   sleeping at this time.    Walking or Climbing Stairs ambulation difficulty, dependent     Mobility Management transfer wheelchair     Dressing/Bathing bathing difficulty, assistance 1 person;dressing difficulty, dependent;bathing difficulty, dependent     Equipment Currently Used at Home wheelchair;walker, rolling;hospital bed     Readmission within 30 days? No     Patient currently being followed by outpatient case management? Unable to determine (comments)     Do you currently have service(s) that help you manage your care at home? Yes     Name and Contact number of agency nursing care home health     Is the pt/caregiver preference to resume services with current agency No     Do you take prescription medications? Yes     Do you have any problems affording any of your prescribed medications? No     Is the patient taking medications as prescribed? --   not known    Who is going to help you get home at discharge? Family seeking hospice care     How do you get to doctors appointments? family or friend will provide     Are you on dialysis? No     Do you take coumadin? No     DME Needed Upon Discharge  --   hospice will arrange    Discharge Plan discussed with: Adult children     Transition of Care Barriers None     Discharge Plan A Home with family;Hospice/home     Discharge Plan B Home with family;Hospice/home                   Reached out to daughter Delmy Vargas, patient is sleeping at this time, resting soundly.  Daughter confirms to case management that discharge plan is home with hospice, and they have talked to Dr. Norton and want to use [facility].  She and her brother are in the same room at the time of this phone call, and both are in agreement with this discharge  plan.  [facility]  uses [facility], but family does not feel needs home health if hospice is coming.  Delmy states patient lives in his own home with another son whom she feels will not be much help.    Patient has been able to get to bathroom with assistance and up to wheelchair to table to feed self, needs assistance with all other ADLs, but feeds self.  Daughter gives permission to [facility].

## 2023-08-26 NOTE — HOSPITAL COURSE
08/26/2023:  Patient has responded nicely to blood products and IV fluids.  Family request transition back to home with hospice services.  [facility].  Will coordinate and discharge once accommodations could be made for the patient and the family at his home.  8/27:  patient dnr.  Family in favor of home with hospice.  Care coordinated with [facility] to begin palliative care at home.  To DC today.

## 2023-08-26 NOTE — ASSESSMENT & PLAN NOTE
BP Readings from Last 3 Encounters:   08/26/23 (!) 168/79   08/14/23 (!) 116/56   07/17/23 (!) 177/75   Denies symptoms.   Continue to monitor.

## 2023-08-26 NOTE — PLAN OF CARE
POC reviewed with pt. Pt has 3rd unit of PRBC infusing. No adverse reactions. IV site clean, dry, intact. Awaiting repeat labs. VSS    Problem: Adult Inpatient Plan of Care  Goal: Plan of Care Review  Outcome: Ongoing, Progressing  Goal: Patient-Specific Goal (Individualized)  Outcome: Ongoing, Progressing  Goal: Absence of Hospital-Acquired Illness or Injury  Outcome: Ongoing, Progressing  Goal: Optimal Comfort and Wellbeing  Outcome: Ongoing, Progressing  Goal: Readiness for Transition of Care  Outcome: Ongoing, Progressing     Problem: Infection  Goal: Absence of Infection Signs and Symptoms  Outcome: Ongoing, Progressing     Problem: Impaired Wound Healing  Goal: Optimal Wound Healing  Outcome: Ongoing, Progressing     Problem: Fall Injury Risk  Goal: Absence of Fall and Fall-Related Injury  Outcome: Ongoing, Progressing     Problem: Skin Injury Risk Increased  Goal: Skin Health and Integrity  Outcome: Ongoing, Progressing

## 2023-08-26 NOTE — NURSING
Received report from Savanna RHODES to continue care. Placed patient on BSC for BM,unable to pass stool,digital exam done hard stool felt but unable to remove.

## 2023-08-26 NOTE — ASSESSMENT & PLAN NOTE
Associated weakness, likely secondary to anemia.   EKG sinus tachycardia, no ST-T wave elevation, first degree block with PVCs.  - correct anemia with 3U PRBC, continue to monitor     [No Acute Distress] : no acute distress [Normal Sclera/Conjunctiva] : normal sclera/conjunctiva [PERRL] : pupils equal round and reactive to light [EOMI] : extraocular movements intact [Normal Supraclavicular Nodes] : no supraclavicular lymphadenopathy [Normal Anterior Cervical Nodes] : no anterior cervical lymphadenopathy [de-identified] : +markedly swollen, erythematous right lower eyelid with one early pustule formation

## 2023-08-26 NOTE — ASSESSMENT & PLAN NOTE
Patient with acute kidney injury/acute renal failure likely due to pre-renal azotemia due to dehydration TOBIAS is currently improving. Baseline creatinine unknown - Labs reviewed- Renal function/electrolytes with Estimated Creatinine Clearance: 28.8 mL/min (A) (based on SCr of 1.5 mg/dL (H)). according to latest data. Monitor urine output and serial BMP and adjust therapy as needed. Avoid nephrotoxins and renally dose meds for GFR listed above.    Lab Results   Component Value Date    CREATININE 1.5 (H) 08/26/2023    CREATININE 1.9 (H) 08/25/2023    CREATININE 1.7 (H) 08/17/2023

## 2023-08-27 NOTE — NURSING
[facility] has been contacted for transportation. Transportation has been set up.

## 2023-08-27 NOTE — ASSESSMENT & PLAN NOTE
Weakness, debility and tachycardia >120s bpm.  After one unit, patient feeling better, HR remained elevated.   - continue with PRBC transfusion, 3U total  - f/u Hg/HCT    Recent Labs   Lab 08/14/23  1227 08/25/23  1234 08/26/23  0616   Hemoglobin 8.1 L 5.9 LL 9.2 L

## 2023-08-27 NOTE — DISCHARGE SUMMARY
"[facility] Medicine  Discharge Summary      Patient Name: Maurice Roy  MRN: [id number]  [facility]: [id number]  Patient Class: OP- Observation  Admission Date: 8/25/2023  Hospital Length of Stay: 1 days  Discharge Date and Time:  08/27/2023 11:09 AM  Attending Physician: Jose Norton Jr., MD   Discharging Provider: Jose Norton Jr, MD  Primary Care Provider: Marleni Castillo MD    Primary Care Team: Networked reference to record PCT     HPI:   Chief Complaint   Patient presents with    Back Pain    Headache       Pt to ED via EMS with complaints of "tailbone" pain and headache. Stated that HHN was at residence to change chavarria cath and sent pt to ED due to pain.       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  Patient states tailbone pain has been there for quite some time.  Home health was at his home to change out his Chavarria and sent him to the emergency department due to pain.  Chavarria was not changed out.  Denies any fever.     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  Was hospitalized on 07/09/2023 due to severe hypocalcemia, weakness, nausea, diarrhea. Brought to clinic in wheelchair by daughter.  Patient is hard of hearing.  Today patient states he is feeling quite poor and has felt bad for the last few weeks.  He states he is had nausea which is moderately controlled with Zofran.  Diarrhea does wax and wane.  Takes Imodium for diarrhea however then experiences constipation.  He is feeling quite fatigued and weak.  Overall feels he has a poor quality of life.  Does complain of headaches that wax and wane.  Denies fever or shortness of breath.  Does complain of " decreased appetite with significant weight loss since last visit.    ED course: /59 mmHg, , RR 20, temp 97.5F, SpO2 99% on room air. Patient with hearing aids.   Admitted to medicine floor for PRBC blood transfusion. Hg/HCT 5.9/16.4 from 8.1/23.9 over 10 days ago. Patient examined in the room with daughter at bedside. After completion of almost one unit of PRBC transfused, patient endorses feeling a little better. Denies any shortness of breath. Currently patient and his daughter are aware of the extent of his metastatic cancer. Patient states he would not want any form of heroic interventions of chest compressions for CPR and intubation and mechanical ventilation for respiratory support. He would like for his headaches to resolve and go home after current treatment for anemia.            * No surgery found *      Hospital Course:   08/26/2023:  Patient has responded nicely to blood products and IV fluids.  Family request transition back to home with hospice services.  They have chosen [facility] for that care.  Will coordinate and discharge once accommodations could be made for the patient and the family at his home.  8/27:  patient dnr.  Family in favor of home with hospice.  [facility] to begin palliative care at home.  To DC today.       Goals of Care Treatment Preferences:  Code Status: DNR      Consults:   Consults (From admission, onward)        Status Ordering Provider     Inpatient consult to Social Work/Case Management  Once        Provider:  (Not yet assigned)    Acknowledged MCKENZIE GUERRERO          Cardiac/Vascular  Tachycardia  Associated weakness, likely secondary to anemia.   EKG sinus tachycardia, no ST-T wave elevation, first degree block with PVCs.  - correct anemia with 3U PRBC, continue to monitor      Hypertension  BP Readings from Last 3 Encounters:   08/27/23 124/74   08/14/23 (!) 116/56   07/17/23 (!) 177/75   Denies symptoms.   Continue to  monitor.         Renal/  Hyperkalemia  Lokelma 5mg, f/u BMP.   - continuous telemetry      TOBIAS (acute kidney injury)  Patient with acute kidney injury/acute renal failure likely due to pre-renal azotemia due to dehydration TOBIAS is currently improving. Baseline creatinine unknown - Labs reviewed- Renal function/electrolytes with Estimated Creatinine Clearance: 28.8 mL/min (A) (based on SCr of 1.5 mg/dL (H)). according to latest data. Monitor urine output and serial BMP and adjust therapy as needed. Avoid nephrotoxins and renally dose meds for GFR listed above.    Lab Results   Component Value Date    CREATININE 1.5 (H) 08/26/2023    CREATININE 1.9 (H) 08/25/2023    CREATININE 1.7 (H) 08/17/2023          Oncology  * Anemia  Weakness, debility and tachycardia >120s bpm.  After one unit, patient feeling better, HR remained elevated.   - continue with PRBC transfusion, 3U total  - f/u Hg/HCT    Recent Labs   Lab 08/14/23  1227 08/25/23  1234 08/26/23  0616   Hemoglobin 8.1 L 5.9 LL 9.2 L           Primary prostate adenocarcinoma  End stage.  Dnr.  Home with hospice care.       Endocrine  Inadequate dietary energy intake  Continue with home nutritional supplementation ENSURE BID.         Final Active Diagnoses:    Diagnosis Date Noted POA    PRINCIPAL PROBLEM:  Anemia [D64.9] 04/09/2022 Yes    Tachycardia [R00.0] 08/25/2023 Yes    Primary prostate adenocarcinoma [C61] 06/07/2023 Yes    Hyperkalemia [E87.5] 03/25/2022 Yes    TOBIAS (acute kidney injury) [N17.9] 03/22/2022 Unknown    Inadequate dietary energy intake [E63.9] 03/21/2022 Yes    Hypertension [I10] 03/17/2022 Yes      Problems Resolved During this Admission:       Discharged Condition: poor    Disposition:     Follow Up:    Patient Instructions:   No discharge procedures on file.    Significant Diagnostic Studies: Labs: All labs within the past 24 hours have been reviewed    Pending Diagnostic Studies:     None         Medications:  Reconciled Home  Medications:      Medication List      ASK your doctor about these medications    amLODIPine 5 MG tablet  Commonly known as: NORVASC  Take 1 tablet (5 mg total) by mouth 2 (two) times daily.     aspirin 81 MG EC tablet  Commonly known as: ECOTRIN  Take 81 mg by mouth once daily.     calcium carbonate 1250 MG capsule  Take 3 capsules (1,500 mg total) by mouth 2 (two) times daily with meals.     cetirizine 10 MG tablet  Commonly known as: ZYRTEC  Take 5 mg by mouth every evening.     cholecalciferol (vitamin D3) 25 mcg (1,000 unit) capsule  Commonly known as: VITAMIN D3  Take 1,000 Units by mouth once daily.     enzalutamide 80 mg Tab  Take 160 mg by mouth once daily.     fenofibrate 145 MG tablet  Commonly known as: TRICOR  Take 145 mg by mouth every evening.     furosemide 20 MG tablet  Commonly known as: LASIX     HYDROcodone-acetaminophen  mg per tablet  Commonly known as: NORCO     levalbuterol 45 mcg/actuation inhaler  Commonly known as: XOPENEX HFA  Inhale 1-2 puffs into the lungs every 4 (four) hours as needed for Wheezing. Rescue     lisinopriL 10 MG tablet  Take 1 tablet (10 mg total) by mouth once daily.     metoprolol succinate 25 MG 24 hr tablet  Commonly known as: TOPROL-XL     mirtazapine 7.5 MG Tab  Commonly known as: REMERON  Take 7.5 mg by mouth every evening.     nebivoloL 20 mg Tab  Commonly known as: BYSTOLIC  Take 10 mg by mouth once daily.     tadalafiL 5 MG tablet  Commonly known as: CIALIS  Take 5 mg by mouth every evening.     tamsulosin 0.4 mg Cap  Commonly known as: FLOMAX  Take by mouth 2 (two) times a day.     TYLENOL ARTHRITIS PAIN 650 MG Tbsr  Generic drug: acetaminophen  Take 1,300 mg by mouth 2 (two) times a day.            Indwelling Lines/Drains at time of discharge:   Lines/Drains/Airways     Drain  Duration                Urethral Catheter 08/25/23 1438 Silicone 16 Fr. 1 day                Time spent on the discharge of patient: 60 minutes         Jose Norton Jr,  MD  Department of Hospital Medicine  [facility]

## 2023-08-27 NOTE — ASSESSMENT & PLAN NOTE
BP Readings from Last 3 Encounters:   08/27/23 124/74   08/14/23 (!) 116/56   07/17/23 (!) 177/75   Denies symptoms.   Continue to monitor.

## 2023-08-27 NOTE — PLAN OF CARE
Contacted [facility] and spoke to Shayy. States Beth Reddy is the nurse on call and will be calling the med-surg floor for report. Patient has been established w/hospice services w/[facility].

## 2023-08-27 NOTE — PLAN OF CARE
Discharge instructions reviewed with patient and family. Peripheral IVs removed and intact. Telemetry monitor removed and returned to proper location. Patient transported by [facility].     Beth with [facility] notified of patient leaving.

## 2023-09-01 PROBLEM — E88.09 HYPOALBUMINEMIA: Status: ACTIVE | Noted: 2023-01-01

## 2023-09-01 PROBLEM — Z71.89 GOALS OF CARE, COUNSELING/DISCUSSION: Status: ACTIVE | Noted: 2023-01-01

## 2023-09-01 PROBLEM — K92.1 GASTROINTESTINAL HEMORRHAGE WITH MELENA: Status: ACTIVE | Noted: 2023-01-01

## 2023-09-01 NOTE — ASSESSMENT & PLAN NOTE
-secondary to metastatic prostate cancer   -significant diffuse pain and tenderness   -p.o. and IV opioids

## 2023-09-01 NOTE — ASSESSMENT & PLAN NOTE
- presents with large episodes of melena and hematemesis, concerning for upper GI bleed; hemoglobin at outside hospital 5.2 on arrival but has now received 2 units PRBC  - Pathway initiated  - 2 LBIV  - orthostatics ordered  - continue Protonix drip  - hold anticoagulation  - trend CBC tid for now; transfuse Hb <7  - NPO  - GI consulted: plan for EGD tomorrow

## 2023-09-01 NOTE — SUBJECTIVE & OBJECTIVE
Past Medical History:   Diagnosis Date    Arthritis     Bilateral hearing loss     Hearing aids    Cancer     prostate    Hypertension     Mobility impaired     recent back surgery, using walker / wheelchair       Past Surgical History:   Procedure Laterality Date    ANKLE SURGERY      right    CHOLECYSTECTOMY      COLONOSCOPY      CYSTOSCOPY N/A 5/17/2022    Procedure: CYSTOSCOPY;  Surgeon: Rocky Rice MD;  Location: [facility] OR;  Service: Urology;  Laterality: N/A;    FACETECTOMY OF VERTEBRA Bilateral 3/16/2022    Procedure: FACETECTOMY, PARTIAL MEDIAL, BILATERAL, L4-5 AND L5-S1;  Surgeon: Anil Hector MD;  Location: [facility] OR;  Service: Orthopedics;  Laterality: Bilateral;    FORAMINOTOMY Bilateral 3/16/2022    Procedure: FORAMINOTOMY, SPINE, BILATERAL, L4-5 AND L5-S1;  Surgeon: Anil Hector MD;  Location: [facility] OR;  Service: Orthopedics;  Laterality: Bilateral;    LAMINOTOMY N/A 3/16/2022    Procedure: LAMINOTOMY S1;  Surgeon: Anil Hector MD;  Location: [facility] OR;  Service: Orthopedics;  Laterality: N/A;    LUMBAR LAMINECTOMY N/A 3/16/2022    Procedure: LAMINECTOMY, SPINE, LUMBAR, OPEN, L4 AND L5;  Surgeon: Anil Hector MD;  Location: [facility] OR;  Service: Orthopedics;  Laterality: N/A;  Will go to CCU Post-op per Dr. LUEVANO    PERIPHERALLY INSERTED CENTRAL CATHETER INSERTION N/A 3/24/2022    Procedure: INSERTION, PICC;  Surgeon: Kathryn Diagnostic Provider;  Location: [facility] OR;  Service: General;  Laterality: N/A;    TRANSURETHRAL RESECTION OF PROSTATE (TURP) USING THULIUM LASER N/A 5/17/2022    Procedure: TRANSURETHRAL W QUANTA LASER (TURP);  Surgeon: Rocky Rice MD;  Location: [facility] OR;  Service: Urology;  Laterality: N/A;  To follow 4       Review of patient's allergies indicates:  No Known Allergies    Current Facility-Administered Medications on File Prior to Encounter   Medication    [COMPLETED] fentaNYL injection 50 mcg    [COMPLETED] ondansetron injection 8 mg    [COMPLETED] pantoprazole  injection 80 mg    [COMPLETED] sodium chloride 0.9% bolus 1,000 mL 1,000 mL    [DISCONTINUED] 0.9%  NaCl infusion (for blood administration)    [DISCONTINUED] fentaNYL injection 75 mcg    [DISCONTINUED] ondansetron injection 8 mg    [DISCONTINUED] pantoprazole (PROTONIX) 40 mg in sodium chloride 0.9 % 100 mL IVPB (MB+)     Current Outpatient Medications on File Prior to Encounter   Medication Sig    acetaminophen (TYLENOL ARTHRITIS PAIN) 650 MG TbSR Take 1,300 mg by mouth 2 (two) times a day.    calcium carbonate 1250 MG capsule Take 3 capsules (1,500 mg total) by mouth 2 (two) times daily with meals.    cetirizine (ZYRTEC) 10 MG tablet Take 5 mg by mouth every evening.    cholecalciferol, vitamin D3, (VITAMIN D3) 25 mcg (1,000 unit) capsule Take 1,000 Units by mouth once daily.    enzalutamide 80 mg Tab Take 160 mg by mouth once daily.    fenofibrate (TRICOR) 145 MG tablet Take 145 mg by mouth every evening.    furosemide (LASIX) 20 MG tablet     HYDROcodone-acetaminophen (NORCO)  mg per tablet     levalbuterol (XOPENEX HFA) 45 mcg/actuation inhaler Inhale 1-2 puffs into the lungs every 4 (four) hours as needed for Wheezing. Rescue    lisinopriL 10 MG tablet Take 1 tablet (10 mg total) by mouth once daily.    metoprolol succinate (TOPROL-XL) 25 MG 24 hr tablet     mirtazapine (REMERON) 7.5 MG Tab Take 7.5 mg by mouth every evening.    nebivoloL (BYSTOLIC) 20 mg Tab Take 10 mg by mouth once daily.    tadalafiL (CIALIS) 5 MG tablet Take 5 mg by mouth every evening.    tamsulosin (FLOMAX) 0.4 mg Cap Take by mouth 2 (two) times a day.     Family History       Problem Relation (Age of Onset)    Heart disease Mother, Father    Hypertension Mother, Father          Tobacco Use    Smoking status: Former    Smokeless tobacco: Former    Tobacco comments:     stopped 30 years ago   Substance and Sexual Activity    Alcohol use: Not Currently     Alcohol/week: 5.0 standard drinks of alcohol     Types: 5 Cans of beer per  week    Drug use: Never    Sexual activity: Not Currently     Review of Systems   Unable to perform ROS: Acuity of condition     Objective:     Vital Signs (Most Recent):  Temp: 97.8 °F (36.6 °C) (09/01/23 1503)  Pulse: (!) 132 (09/01/23 1503)  Resp: 19 (09/01/23 1547)  BP: (!) 175/82 (09/01/23 1503)  SpO2: 100 % (09/01/23 1503) Vital Signs (24h Range):  Temp:  [95.6 °F (35.3 °C)-97.8 °F (36.6 °C)] 97.8 °F (36.6 °C)  Pulse:  [] 132  Resp:  [18-31] 19  SpO2:  [98 %-100 %] 100 %  BP: (116-175)/(54-86) 175/82     Weight: 70 kg (154 lb 5.2 oz)  Body mass index is 24.17 kg/m².     Physical Exam  Vitals reviewed.   Constitutional:       General: He is not in acute distress.     Appearance: He is well-developed. He is ill-appearing. He is not diaphoretic.   HENT:      Head: Normocephalic and atraumatic.      Ears:      Comments: Deaf     Nose: Nose normal.   Eyes:      General: No scleral icterus.     Pupils: Pupils are equal, round, and reactive to light.   Neck:      Vascular: No JVD.      Trachea: No tracheal deviation.   Cardiovascular:      Rate and Rhythm: Regular rhythm. Tachycardia present.      Heart sounds: Normal heart sounds. No murmur heard.     No friction rub. No gallop.   Pulmonary:      Effort: Pulmonary effort is normal. No respiratory distress.      Breath sounds: Normal breath sounds.   Abdominal:      General: There is no distension.      Palpations: Abdomen is soft. There is no mass.      Tenderness: There is no abdominal tenderness.      Hernia: No hernia is present.   Genitourinary:     Comments: Nixon  Musculoskeletal:         General: No deformity.      Cervical back: Normal range of motion.      Right lower leg: Edema present.      Left lower leg: Edema present.   Skin:     General: Skin is warm and dry.      Findings: No rash.   Neurological:      Mental Status: He is alert and oriented to person, place, and time.   Psychiatric:         Behavior: Behavior normal.              CRANIAL  NERVES     CN III, IV, VI   Pupils are equal, round, and reactive to light.       Significant Labs: All pertinent labs within the past 24 hours have been reviewed.    Significant Imaging: I have reviewed all pertinent imaging results/findings within the past 24 hours.

## 2023-09-01 NOTE — ASSESSMENT & PLAN NOTE
Advance Care Planning     Code Status  In light of the patients advanced and life limiting illness,I engaged the the patient in a voluntary conversation about the patient's preferences for care  at the very end of life. The patient wishes to have a natural, peaceful death.  Along those lines, the patient does not wish to have CPR or other invasive treatments performed when his heart and/or breathing stops. I communicated to the patient that a DNR order would be placed in his medical record to reflect this preference.  I spent a total of 5 minutes engaging the patient in this advance care planning discussion.

## 2023-09-01 NOTE — ASSESSMENT & PLAN NOTE
-in the setting of metastatic cancer   -NPO for now due to acute GI bleeding but may benefit from nutrition consult prior to discharge

## 2023-09-01 NOTE — H&P
"[facility] Medicine  History & Physical    Patient Name: Maurice Roy  MRN: [id number]  Patient Class: IP- Inpatient  Admission Date: 9/1/2023  Attending Physician: Boogie Montes De Oca MD  Primary Care Provider: Marleni Castillo MD         Patient information was obtained from patient, past medical records and ER records.     Subjective:     Principal Problem:Gastrointestinal hemorrhage with melena    Chief Complaint:   Chief Complaint   Patient presents with    GI Bleeding        HPI: Mr. 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 July 9-13 with anemia, weakness, nausea, hypocalcemia, and diarrhea.  He received packed red blood cells and calcium supplementation.  Course was complicated by elevated potassium that was treated with Lokelma.  He was subsequently admitted to [facility] August 25-27 with headache and bone pain.  He was again noted to have significant anemia with hemoglobin 5.9.  He received packed red blood cell transfusions with improvement in his hemoglobin to 9.2.  He was discharged on home hospice.  Discharge summary from August 27 noted patient is on aspirin 81 mg.  He presented to [facility] on September 1 with nausea and intermittent diarrhea.  He also had fatigue and weakness.  Headache waxes and wanes.  No fever or dyspnea noted.  He was tachycardic, and on rectal exam he had melenic stool.  Hemoglobin was noted to be 5.2.  In the emergency department he developed emesis of dark colored material.  He has persistent melena during his ED stay.  Mentation is intact, and respiratory status is stable.  First unit of packed red blood cells was started around 8:40 a.m..  ED spoke with the patient and family.  They would like evaluation by GI and necessary procedures with the active GI bleeding.  Requesting transfer to " "Hospital Medicine at [facility] in ICU status for Gastroenterology evaluation of GI bleed.  In the emergency department he received Protonix bolus and infusion, normal saline, and pain/nausea medicine.     White blood cells 15.51, hemoglobin 5.2, hematocrit 15.9, platelets 269, stool for occult blood positive, INR 1.5, sodium 135, potassium 4.9, chloride 106, CO2 17, BUN 72, creatinine 1.9, glucose 199, total bilirubin 0.2, AST 16, ALT 9     August 25: CT head had no acute intracranial findings.  Somewhat mottled appearance to the cranial bones."    Upon my assessment, patient reports having pain, stating that he is tender all over.   He is tachycardic currently but blood pressure is stable.      Past Medical History:   Diagnosis Date    Arthritis     Bilateral hearing loss     Hearing aids    Cancer     prostate    Hypertension     Mobility impaired     recent back surgery, using walker / wheelchair       Past Surgical History:   Procedure Laterality Date    ANKLE SURGERY      right    CHOLECYSTECTOMY      COLONOSCOPY      CYSTOSCOPY N/A 5/17/2022    Procedure: CYSTOSCOPY;  Surgeon: Rocky Rice MD;  Location: [facility] OR;  Service: Urology;  Laterality: N/A;    FACETECTOMY OF VERTEBRA Bilateral 3/16/2022    Procedure: FACETECTOMY, PARTIAL MEDIAL, BILATERAL, L4-5 AND L5-S1;  Surgeon: Anil Hector MD;  Location: [facility] OR;  Service: Orthopedics;  Laterality: Bilateral;    FORAMINOTOMY Bilateral 3/16/2022    Procedure: FORAMINOTOMY, SPINE, BILATERAL, L4-5 AND L5-S1;  Surgeon: Anil Hector MD;  Location: [facility] OR;  Service: Orthopedics;  Laterality: Bilateral;    LAMINOTOMY N/A 3/16/2022    Procedure: LAMINOTOMY S1;  Surgeon: Anil Hector MD;  Location: [facility] OR;  Service: Orthopedics;  Laterality: N/A;    LUMBAR LAMINECTOMY N/A 3/16/2022    Procedure: LAMINECTOMY, SPINE, LUMBAR, OPEN, L4 AND L5;  Surgeon: Anil Hector MD;  Location: [facility] OR;  Service: Orthopedics;  Laterality: N/A;  " Will go to CCU Post-op per Dr. LUEVANO    PERIPHERALLY INSERTED CENTRAL CATHETER INSERTION N/A 3/24/2022    Procedure: INSERTION, PICC;  Surgeon: Kathryn Diagnostic Provider;  Location: [facility] OR;  Service: General;  Laterality: N/A;    TRANSURETHRAL RESECTION OF PROSTATE (TURP) USING THULIUM LASER N/A 5/17/2022    Procedure: TRANSURETHRAL W QUANTA LASER (TURP);  Surgeon: Rocky Rice MD;  Location: [facility] OR;  Service: Urology;  Laterality: N/A;  To follow RM4       Review of patient's allergies indicates:  No Known Allergies    Current Facility-Administered Medications on File Prior to Encounter   Medication    [COMPLETED] fentaNYL injection 50 mcg    [COMPLETED] ondansetron injection 8 mg    [COMPLETED] pantoprazole injection 80 mg    [COMPLETED] sodium chloride 0.9% bolus 1,000 mL 1,000 mL    [DISCONTINUED] 0.9%  NaCl infusion (for blood administration)    [DISCONTINUED] fentaNYL injection 75 mcg    [DISCONTINUED] ondansetron injection 8 mg    [DISCONTINUED] pantoprazole (PROTONIX) 40 mg in sodium chloride 0.9 % 100 mL IVPB (MB+)     Current Outpatient Medications on File Prior to Encounter   Medication Sig    acetaminophen (TYLENOL ARTHRITIS PAIN) 650 MG TbSR Take 1,300 mg by mouth 2 (two) times a day.    calcium carbonate 1250 MG capsule Take 3 capsules (1,500 mg total) by mouth 2 (two) times daily with meals.    cetirizine (ZYRTEC) 10 MG tablet Take 5 mg by mouth every evening.    cholecalciferol, vitamin D3, (VITAMIN D3) 25 mcg (1,000 unit) capsule Take 1,000 Units by mouth once daily.    enzalutamide 80 mg Tab Take 160 mg by mouth once daily.    fenofibrate (TRICOR) 145 MG tablet Take 145 mg by mouth every evening.    furosemide (LASIX) 20 MG tablet     HYDROcodone-acetaminophen (NORCO)  mg per tablet     levalbuterol (XOPENEX HFA) 45 mcg/actuation inhaler Inhale 1-2 puffs into the lungs every 4 (four) hours as needed for Wheezing. Rescue    lisinopriL 10 MG tablet Take 1 tablet (10 mg  total) by mouth once daily.    metoprolol succinate (TOPROL-XL) 25 MG 24 hr tablet     mirtazapine (REMERON) 7.5 MG Tab Take 7.5 mg by mouth every evening.    nebivoloL (BYSTOLIC) 20 mg Tab Take 10 mg by mouth once daily.    tadalafiL (CIALIS) 5 MG tablet Take 5 mg by mouth every evening.    tamsulosin (FLOMAX) 0.4 mg Cap Take by mouth 2 (two) times a day.     Family History       Problem Relation (Age of Onset)    Heart disease Mother, Father    Hypertension Mother, Father          Tobacco Use    Smoking status: Former    Smokeless tobacco: Former    Tobacco comments:     stopped 30 years ago   Substance and Sexual Activity    Alcohol use: Not Currently     Alcohol/week: 5.0 standard drinks of alcohol     Types: 5 Cans of beer per week    Drug use: Never    Sexual activity: Not Currently     Review of Systems   Unable to perform ROS: Acuity of condition     Objective:     Vital Signs (Most Recent):  Temp: 97.8 °F (36.6 °C) (09/01/23 1503)  Pulse: (!) 132 (09/01/23 1503)  Resp: 19 (09/01/23 1547)  BP: (!) 175/82 (09/01/23 1503)  SpO2: 100 % (09/01/23 1503) Vital Signs (24h Range):  Temp:  [95.6 °F (35.3 °C)-97.8 °F (36.6 °C)] 97.8 °F (36.6 °C)  Pulse:  [] 132  Resp:  [18-31] 19  SpO2:  [98 %-100 %] 100 %  BP: (116-175)/(54-86) 175/82     Weight: 70 kg (154 lb 5.2 oz)  Body mass index is 24.17 kg/m².     Physical Exam  Vitals reviewed.   Constitutional:       General: He is not in acute distress.     Appearance: He is well-developed. He is ill-appearing. He is not diaphoretic.   HENT:      Head: Normocephalic and atraumatic.      Ears:      Comments: Deaf     Nose: Nose normal.   Eyes:      General: No scleral icterus.     Pupils: Pupils are equal, round, and reactive to light.   Neck:      Vascular: No JVD.      Trachea: No tracheal deviation.   Cardiovascular:      Rate and Rhythm: Regular rhythm. Tachycardia present.      Heart sounds: Normal heart sounds. No murmur heard.     No friction rub. No  gallop.   Pulmonary:      Effort: Pulmonary effort is normal. No respiratory distress.      Breath sounds: Normal breath sounds.   Abdominal:      General: There is no distension.      Palpations: Abdomen is soft. There is no mass.      Tenderness: There is no abdominal tenderness.      Hernia: No hernia is present.   Genitourinary:     Comments: Tiffani  Musculoskeletal:         General: No deformity.      Cervical back: Normal range of motion.      Right lower leg: Edema present.      Left lower leg: Edema present.   Skin:     General: Skin is warm and dry.      Findings: No rash.   Neurological:      Mental Status: He is alert and oriented to person, place, and time.   Psychiatric:         Behavior: Behavior normal.              CRANIAL NERVES     CN III, IV, VI   Pupils are equal, round, and reactive to light.       Significant Labs: All pertinent labs within the past 24 hours have been reviewed.    Significant Imaging: I have reviewed all pertinent imaging results/findings within the past 24 hours.    Assessment/Plan:     * Gastrointestinal hemorrhage with melena  - presents with large episodes of melena and hematemesis, concerning for upper GI bleed; hemoglobin at outside hospital 5.2 on arrival but has now received 2 units PRBC  - Pathway initiated  - 2 LBIV  - orthostatics ordered  - continue Protonix drip  - hold anticoagulation  - trend CBC tid for now; transfuse Hb <7  - NPO  - GI consulted: plan for EGD tomorrow        Hypoalbuminemia  -in the setting of metastatic cancer   -NPO for now due to acute GI bleeding but may benefit from nutrition consult prior to discharge      Goals of care, counseling/discussion  Advance Care Planning     Code Status  In light of the patients advanced and life limiting illness,I engaged the the patient in a voluntary conversation about the patient's preferences for care  at the very end of life. The patient wishes to have a natural, peaceful death.  Along those lines, the  patient does not wish to have CPR or other invasive treatments performed when his heart and/or breathing stops. I communicated to the patient that a DNR order would be placed in his medical record to reflect this preference.  I spent a total of 5 minutes engaging the patient in this advance care planning discussion.         Tachycardia  -in the setting of acute GI bleeding  -EKG      Primary prostate adenocarcinoma  -history of metastatic prostate cancer on enzalutamide      Metastasis to bone  -secondary to metastatic prostate cancer   -significant diffuse pain and tenderness   -p.o. and IV opioids      BPH with obstruction/lower urinary tract symptoms  -continue home tamsulosin  -Nixon placed at outside hospital      Hyperkalemia  -improving; potassium is now 4.9      TOBIAS (acute kidney injury)  Patient with acute kidney injury/acute renal failure likely due to pre-renal azotemia due to IVVD TOBIAS is currently worsening. Baseline creatinine 1.5-1.8 - Labs reviewed- Renal function/electrolytes with Estimated Creatinine Clearance: 22.7 mL/min (A) (based on SCr of 1.9 mg/dL (H)). according to latest data. Monitor urine output and serial BMP and adjust therapy as needed. Avoid nephrotoxins and renally dose meds for GFR listed above.    Hypertension  -mild hypertension upon arrival, likely partially secondary to pain   -can cautiously resume home antihypertensives; deferring at present moment until pain is better controlled due to acute GI bleeding        VTE Risk Mitigation (From admission, onward)         Ordered     IP VTE HIGH RISK PATIENT  Once         09/01/23 1522     Place sequential compression device  Until discontinued         09/01/23 1522              Critical care time spent on the evaluation and treatment of severe organ dysfunction, review of pertinent labs and imaging studies, discussions with consulting providers and discussions with patient/family: 40 minutes.             Boogie Montes De Oca,  MD  Department of Hospital Medicine  [facility] - Intensive Care

## 2023-09-01 NOTE — ED PROVIDER NOTES
Encounter Date: 9/1/2023       History     Chief Complaint   Patient presents with    Rectal Bleeding     Pt presents to the ER via EMS for eval due to black stool. Per EMS pt called family this morning complaining of abd pain, found this morning to have black, tarry stool.     89-year-old man with a history of metastatic prostate cancer presents to the emergency department with dark tarry stools, allegedly began this morning.  Pale-appearing.  Recently here for severe anemia.    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  Was hospitalized on 07/09/2023 due to severe hypocalcemia, weakness, nausea, diarrhea. Brought to clinic in wheelchair by daughter.  Patient is hard of hearing.  Today patient states he is feeling quite poor and has felt bad for the last few weeks.  He states he is had nausea which is moderately controlled with Zofran.  Diarrhea does wax and wane.  Takes Imodium for diarrhea however then experiences constipation.  He is feeling quite fatigued and weak.  Overall feels he has a poor quality of life.  Does complain of headaches that wax and wane.  Denies fever or shortness of breath.  Does complain of decreased appetite with significant weight loss since last visit.    Patient vomited here dark-colored emesis            Review of patient's allergies indicates:  No Known Allergies  Past Medical History:   Diagnosis Date    Arthritis     Bilateral hearing loss     Hearing aids    Cancer     prostate    Hypertension     Mobility impaired     recent back surgery, using walker / wheelchair     Past Surgical History:   Procedure Laterality Date    ANKLE SURGERY      right    CHOLECYSTECTOMY      COLONOSCOPY      CYSTOSCOPY N/A 5/17/2022    Procedure: CYSTOSCOPY;  Surgeon: Rocky Rice MD;  Location:  [facility] OR;  Service: Urology;  Laterality: N/A;    FACETECTOMY OF VERTEBRA Bilateral 3/16/2022    Procedure: FACETECTOMY, PARTIAL MEDIAL, BILATERAL, L4-5 AND L5-S1;  Surgeon: Anil Hector MD;  Location: [facility] OR;  Service: Orthopedics;  Laterality: Bilateral;    FORAMINOTOMY Bilateral 3/16/2022    Procedure: FORAMINOTOMY, SPINE, BILATERAL, L4-5 AND L5-S1;  Surgeon: Anil Hector MD;  Location: [facility] OR;  Service: Orthopedics;  Laterality: Bilateral;    LAMINOTOMY N/A 3/16/2022    Procedure: LAMINOTOMY S1;  Surgeon: Anil Hector MD;  Location: [facility] OR;  Service: Orthopedics;  Laterality: N/A;    LUMBAR LAMINECTOMY N/A 3/16/2022    Procedure: LAMINECTOMY, SPINE, LUMBAR, OPEN, L4 AND L5;  Surgeon: Anil Hector MD;  Location: [facility] OR;  Service: Orthopedics;  Laterality: N/A;  Will go to CCU Post-op per Dr. LUEVANO    PERIPHERALLY INSERTED CENTRAL CATHETER INSERTION N/A 3/24/2022    Procedure: INSERTION, PICC;  Surgeon: Kathryn Diagnostic Provider;  Location: [facility] OR;  Service: General;  Laterality: N/A;    TRANSURETHRAL RESECTION OF PROSTATE (TURP) USING THULIUM LASER N/A 5/17/2022    Procedure: TRANSURETHRAL W QUANTA LASER (TURP);  Surgeon: Rocky Rice MD;  Location: [facility] OR;  Service: Urology;  Laterality: N/A;  To follow RM4     Family History   Problem Relation Age of Onset    Hypertension Mother     Heart disease Mother     Hypertension Father     Heart disease Father      Social History     Tobacco Use    Smoking status: Former    Smokeless tobacco: Former    Tobacco comments:     stopped 30 years ago   Substance Use Topics    Alcohol use: Not Currently     Alcohol/week: 5.0 standard drinks of alcohol     Types: 5 Cans of beer per week    Drug use: Never     Review of Systems   Constitutional:  Negative for fever.   HENT:  Negative for sore throat.    Respiratory:  Negative for shortness of breath.    Cardiovascular:  Negative for chest pain.   Gastrointestinal:  Positive for blood in stool. Negative for  nausea.   Genitourinary:  Negative for dysuria.   Musculoskeletal:  Negative for back pain.   Skin:  Positive for pallor. Negative for rash.   Neurological:  Negative for weakness.   Hematological:  Does not bruise/bleed easily.   All other systems reviewed and are negative.      Physical Exam     Initial Vitals   BP Pulse Resp Temp SpO2   09/01/23 0707 09/01/23 0707 09/01/23 0707 09/01/23 0809 09/01/23 0707   122/86 (!) 126 20 96.2 °F (35.7 °C) 100 %      MAP       --                Physical Exam    Nursing note and vitals reviewed.  Constitutional: He appears well-developed and well-nourished. He is not diaphoretic. No distress.   HENT:   Head: Normocephalic and atraumatic.   Eyes: Conjunctivae and EOM are normal. Pupils are equal, round, and reactive to light. Right eye exhibits no discharge. Left eye exhibits no discharge. No scleral icterus.   Neck: Neck supple. No JVD present.   Normal range of motion.  Cardiovascular:  Regular rhythm, normal heart sounds and intact distal pulses.           No murmur heard.  Tachycardic at 126 beats per minute   Pulmonary/Chest: Breath sounds normal. No stridor. No respiratory distress. He has no wheezes. He has no rhonchi. He has no rales. He exhibits no tenderness.   Abdominal: Abdomen is soft. Bowel sounds are normal. He exhibits no distension and no mass. There is no abdominal tenderness. There is no rebound and no guarding.   Genitourinary:    Genitourinary Comments: Rectal exam performed, stool is melanotic     Musculoskeletal:         General: No tenderness or edema. Normal range of motion.      Cervical back: Normal range of motion and neck supple.     Neurological: He is alert and oriented to person, place, and time. He has normal strength. GCS score is 15. GCS eye subscore is 4. GCS verbal subscore is 5. GCS motor subscore is 6.   Skin: Skin is warm and dry. Capillary refill takes less than 2 seconds. There is pallor.         ED Course   Critical Care    Date/Time:  9/1/2023 9:32 AM    Performed by: Phoenix Santana MD  Authorized by: Phoenix Santana MD  Direct patient critical care time: 15 minutes  Additional history critical care time: 15 minutes  Ordering / reviewing critical care time: 10 minutes  Documentation critical care time: 10 minutes  Consulting other physicians critical care time: 10 minutes  Consult with family critical care time: 10 minutes  Total critical care time (exclusive of procedural time) : 70 minutes  Critical care was necessary to treat or prevent imminent or life-threatening deterioration of the following conditions: Active GI bleed requiring IV transfusion.  Critical care was time spent personally by me on the following activities: review of old charts, re-evaluation of patient's condition, pulse oximetry, ordering and review of laboratory studies, ordering and performing treatments and interventions, obtaining history from patient or surrogate, examination of patient, evaluation of patient's response to treatment and development of treatment plan with patient or surrogate.        Labs Reviewed   CBC W/ AUTO DIFFERENTIAL - Abnormal; Notable for the following components:       Result Value    WBC 15.51 (*)     RBC 1.60 (*)     Hemoglobin 5.2 (*)     Hematocrit 15.9 (*)     MCV 99 (*)     MCH 32.5 (*)     RDW 21.8 (*)     Lymph % 55.0 (*)     All other components within normal limits    Narrative:     HGB/HCT critical result(s) called and verbal readback obtained from   Radha Land RN  by [facility] 09/01/2023 08:00   COMPREHENSIVE METABOLIC PANEL - Abnormal; Notable for the following components:    Sodium 135 (*)     CO2 17 (*)     Glucose 199 (*)     BUN 72 (*)     Creatinine 1.9 (*)     Calcium 7.0 (*)     Total Protein 3.9 (*)     Albumin 1.7 (*)     Alkaline Phosphatase 173 (*)     ALT 9 (*)     eGFR 32.5 (*)     All other components within normal limits   PROTIME-INR - Abnormal; Notable for the following components:    Prothrombin Time 14.3  (*)     INR 1.5 (*)     All other components within normal limits   OCCULT BLOOD X 1, STOOL - Abnormal; Notable for the following components:    Occult Blood Positive (*)     All other components within normal limits   APTT   TYPE & SCREEN   PREPARE RBC SOFT          Imaging Results    None          Medications   0.9%  NaCl infusion (for blood administration) (has no administration in time range)   pantoprazole (PROTONIX) 40 mg in sodium chloride 0.9 % 100 mL IVPB (MB+) (8 mg/hr Intravenous New Bag 9/1/23 0757)   sodium chloride 0.9% bolus 1,000 mL 1,000 mL (0 mLs Intravenous Stopped 9/1/23 0818)   pantoprazole injection 80 mg (80 mg Intravenous Given 9/1/23 0756)   ondansetron injection 8 mg (8 mg Intravenous Given 9/1/23 0809)   fentaNYL injection 50 mcg (50 mcg Intravenous Given 9/1/23 0904)     Medical Decision Making  Amount and/or Complexity of Data Reviewed  Labs: ordered. Decision-making details documented in ED Course.    Risk  Prescription drug management.               ED Course as of 09/01/23 0935   Fri Sep 01, 2023   0743 Hemoglobin(!!): 5.2 [SD]   0743 Hematocrit(!!): 15.9 [SD]   0743 Occult Blood(!): Positive [SD]   0931 Discussed case with daughter and her , agreed to transfer to [place] for GI services [SD]      ED Course User Index  [SD] Phoenix Santana MD                    Clinical Impression:   Final diagnoses:  [K92.2] Gastrointestinal hemorrhage, unspecified gastrointestinal hemorrhage type (Primary)  [K92.1] Melena  [D64.9] Anemia, unspecified type        ED Disposition Condition    Transfer to Another Facility Stable                Phoenix Santana MD  09/01/23 0981

## 2023-09-01 NOTE — ASSESSMENT & PLAN NOTE
Patient with acute kidney injury/acute renal failure likely due to pre-renal azotemia due to IVVD TOBIAS is currently worsening. Baseline creatinine 1.5-1.8 - Labs reviewed- Renal function/electrolytes with Estimated Creatinine Clearance: 22.7 mL/min (A) (based on SCr of 1.9 mg/dL (H)). according to latest data. Monitor urine output and serial BMP and adjust therapy as needed. Avoid nephrotoxins and renally dose meds for GFR listed above.

## 2023-09-01 NOTE — PROVIDER TRANSFER
(Physician in Lead of Transfers)  Outside Transfer Acceptance Note / Regional Referral Center    Upon patient arrival, please contact Hospital Medicine on call.    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  He received packed red blood cells and calcium supplementation.  Course was complicated by elevated potassium that was treated with Lokelma.  He was subsequently admitted to [facility] August 25-27 with headache and bone pain.  He was again noted to have significant anemia with hemoglobin 5.9.  He received packed red blood cell transfusions with improvement in his hemoglobin to 9.2.  He was discharged on home hospice.  Discharge summary from August 27 noted patient is on aspirin 81 mg.  He presented to [facility] on September 1 with nausea and intermittent diarrhea.  He also had fatigue and weakness.  Headache waxes and wanes.  No fever or dyspnea noted.  He was tachycardic, and on rectal exam he had melenic stool.  Hemoglobin was noted to be 5.2.  In the emergency department he developed emesis of dark colored material.  He has persistent melena during his ED stay.  Mentation is intact, and respiratory status is stable.  First unit of packed red blood cells was started around 8:40 a.m..  ED spoke with  the patient and family.  They would like evaluation by GI and necessary procedures with the active GI bleeding.  Requesting transfer to Hospital Medicine at [facility] in ICU status for Gastroenterology evaluation of GI bleed.  In the emergency department he received Protonix bolus and infusion, normal saline, and pain/nausea medicine.    White blood cells 15.51, hemoglobin 5.2, hematocrit 15.9, platelets 269, stool for occult blood positive, INR 1.5, sodium 135, potassium 4.9, chloride 106, CO2 17, BUN 72, creatinine 1.9, glucose 199, total bilirubin 0.2, AST 16, ALT 9    August 25: CT head had no acute intracranial findings.  Somewhat mottled appearance to the cranial bones.    Objective     Vitals: Temp: 96.3 °F (35.7 °C) (09/01/23 0853)  Pulse: 101 (09/01/23 0853)  Resp: 20 (09/01/23 0904)  BP: (!) 116/54 (09/01/23 0853)  SpO2: 98 % (09/01/23 0853)  Recent Labs: CBC:   Recent Labs   Lab 09/01/23 0726   WBC 15.51*   HGB 5.2*   HCT 15.9*        CMP:   Recent Labs   Lab 09/01/23 0726   *   K 4.9      CO2 17*   *   BUN 72*   CREATININE 1.9*   CALCIUM 7.0*   PROT 3.9*   ALBUMIN 1.7*   BILITOT 0.2   ALKPHOS 173*   AST 16   ALT 9*   ANIONGAP 12     Coagulation:   Recent Labs   Lab 09/01/23 0726   INR 1.5*   APTT 21.0         Instructions    Admit to Hospital Medicine  Consult Gastroenterology      YURIDIA Crews MD  Hospital Medicine Staff  Cell: [phone]

## 2023-09-01 NOTE — ASSESSMENT & PLAN NOTE
-mild hypertension upon arrival, likely partially secondary to pain   -can cautiously resume home antihypertensives; deferring at present moment until pain is better controlled due to acute GI bleeding

## 2023-09-01 NOTE — HPI
"Mr. 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  He received packed red blood cells and calcium supplementation.  Course was complicated by elevated potassium that was treated with Lokelma.  He was subsequently admitted to [facility] August 25-27 with headache and bone pain.  He was again noted to have significant anemia with hemoglobin 5.9.  He received packed red blood cell transfusions with improvement in his hemoglobin to 9.2.  He was discharged on home hospice.  Discharge summary from August 27 noted patient is on aspirin 81 mg.  He presented to [facility] on September 1 with nausea and intermittent diarrhea.  He also had fatigue and weakness.  Headache waxes and wanes.  No fever or dyspnea noted.  He was tachycardic, and on rectal exam he had melenic stool.  Hemoglobin was noted to be 5.2.  In the emergency department he developed emesis of dark colored material.  He has persistent melena during his ED stay.  Mentation is intact, and respiratory status is stable.  First unit of packed red blood cells was started around 8:40 a.m..  ED spoke with the patient and family.  They would like evaluation by GI and necessary procedures with the active GI bleeding.  Requesting transfer to Hospital Medicine at [facility] in ICU status for Gastroenterology evaluation of GI bleed.  In the emergency department he received Protonix bolus and infusion, normal saline, and pain/nausea medicine.     White blood cells 15.51, hemoglobin 5.2, hematocrit 15.9, platelets 269, stool for occult blood positive, INR 1.5, sodium 135, potassium 4.9, chloride 106, CO2 17, BUN 72, creatinine 1.9, glucose 199, total bilirubin 0.2, AST 16, ALT 9     August 25: CT head had no acute intracranial findings.  Somewhat mottled " "appearance to the cranial bones."    Upon my assessment, patient reports having pain, stating that he is tender all over.   He is tachycardic currently but blood pressure is stable.  "

## 2023-09-01 NOTE — ED NOTES
Pt had a bowel movement. RN and EDT changed pt. Black tarry stool noted. Pt tolerated well. Family at bedside.   Replaced bear-hugger on pt. Pt denies pain at this time. Continue with plan of care.

## 2023-09-01 NOTE — NURSING TRANSFER
Nursing Transfer Note      9/1/2023   1440    Nurse giving handoff:ANDRES Girard RN  Nurse receiving handoff:ANDRES Headley RN    Reason patient is being transferred: GI consult    Transfer From: [facility] ED    Transfer via stretcher    Transfer with O2 and cardiac monitoring    Transported by EMS    Transfer Vital Signs:  See flowsheet    Telemetry: ICU monitor 558  Order for Tele Monitor? Yes    Additional Lines: Nixon Catheter    4eyes on Skin: yes    Medicines sent: Protonix and PRBCs    Any special needs or follow-up needed: none at this time    Patient belongings transferred with patient: Yes,bilat hearing aids    Chart send with patient: Yes    Notified: daughter    Patient reassessed at: 1445 on 9/1/23    Upon arrival to floor: cardiac monitor applied, patient oriented to room, call bell in reach, and bed in lowest position

## 2023-09-02 NOTE — PROGRESS NOTES
Pt's skin very frail, after placing introducer and midline into pt, pt developed approx 1 inch skin tear at insertion site. Once statlock applied, 2 steristrips were applied to approximate edges together, all within sterile field, sterile dressing applied. Nurse and daughter at bedside so I could discuss findings

## 2023-09-02 NOTE — DISCHARGE SUMMARY
"[facility] Medicine  Discharge Summary      Patient Name: Maurice Roy  MRN: [id number]  SUNITA: [id number]  Patient Class: IP- Inpatient  Admission Date: 9/1/2023  Hospital Length of Stay: 1 days  Discharge Date and Time: 9/2/2023     Attending Physician: No att. providers found   Discharging Provider: Boogie Montes De Oca MD  Primary Care Provider: Marleni Castillo MD    Primary Care Team: Networked reference to record PCT     HPI:   Mr. 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  He received packed red blood cells and calcium supplementation.  Course was complicated by elevated potassium that was treated with Lokelma.  He was subsequently admitted to [facility] August 25-27 with headache and bone pain.  He was again noted to have significant anemia with hemoglobin 5.9.  He received packed red blood cell transfusions with improvement in his hemoglobin to 9.2.  He was discharged on home hospice.  Discharge summary from August 27 noted patient is on aspirin 81 mg.  He presented to [facility] on September 1 with nausea and intermittent diarrhea.  He also had fatigue and weakness.  Headache waxes and wanes.  No fever or dyspnea noted.  He was tachycardic, and on rectal exam he had melenic stool.  Hemoglobin was noted to be 5.2.  In the emergency department he developed emesis of dark colored material.  He has persistent melena during his ED stay.  Mentation is intact, and respiratory status is stable.  First unit of packed red blood cells was started around 8:40 a.m..  ED spoke with the patient and family.  They would like evaluation by GI and necessary procedures with the active GI bleeding.  Requesting transfer to Hospital Medicine at [facility] in ICU status for " "Gastroenterology evaluation of GI bleed.  In the emergency department he received Protonix bolus and infusion, normal saline, and pain/nausea medicine.     White blood cells 15.51, hemoglobin 5.2, hematocrit 15.9, platelets 269, stool for occult blood positive, INR 1.5, sodium 135, potassium 4.9, chloride 106, CO2 17, BUN 72, creatinine 1.9, glucose 199, total bilirubin 0.2, AST 16, ALT 9     : CT head had no acute intracranial findings.  Somewhat mottled appearance to the cranial bones."    Upon my assessment, patient reports having pain, stating that he is tender all over.   He is tachycardic currently but blood pressure is stable.      * No surgery found *      Hospital Course:   Mr. Roy presented as transfer for acute upper GI bleeding with significant anemia. Initially Hb 5.2 on admission, received 2u PRBC. Discussed with GI and endoscopy planned for . Unfortunately, patient developed worsening of hemodynamics overnight. Discussed with family and decision was made to pursue comfort measures (had previously been on hospice care due to his metastatic prostate cancer). He  at 03:25 AM on 2023.        Goals of Care Treatment Preferences:  Code Status: DNR      Consults:   Consults (From admission, onward)        Status Ordering Provider     Inpatient consult to Midline team  Once        Provider:  (Not yet assigned)    Completed ELLERMAN, JUSTIN     Inpatient consult to Gastroenterology[facility]  Once        Provider:  (Not yet assigned)    Acknowledged SAM XIONG          No new Assessment & Plan notes have been filed under this hospital service since the last note was generated.  Service: Hospital Medicine    Final Active Diagnoses:    Diagnosis Date Noted POA    PRINCIPAL PROBLEM:  Gastrointestinal hemorrhage with melena [K92.1] 2023 Yes    Goals of care, counseling/discussion [Z71.89] 2023 Not Applicable    Hypoalbuminemia [E88.09] 2023 Yes    " Tachycardia [R00.0] 2023 Yes    Metastasis to bone [C79.51] 2023 Yes    Primary prostate adenocarcinoma [C61] 2023 Yes    BPH with obstruction/lower urinary tract symptoms [N40.1, N13.8] 2022 Yes    Hyperkalemia [E87.5] 2022 Yes    TOBIAS (acute kidney injury) [N17.9] 2022 Yes    Hypertension [I10] 2022 Yes      Problems Resolved During this Admission:       Discharged Condition:     Disposition:     Follow Up:    Patient Instructions:   No discharge procedures on file.    Significant Diagnostic Studies: N/A    Pending Diagnostic Studies:     Procedure Component Value Units Date/Time    Basic Metabolic Panel [[id number]]     Order Status: Sent Lab Status: No result     Specimen: Blood     CBC auto differential [[id number]]     Order Status: Sent Lab Status: No result     Specimen: Blood     CBC auto differential [[id number]]     Order Status: Sent Lab Status: No result     Specimen: Blood     CBC auto differential [[id number]]     Order Status: Sent Lab Status: No result     Specimen: Blood          Medications:  None    Indwelling Lines/Drains at time of discharge:   Lines/Drains/Airways     Drain  Duration                Urethral Catheter 23 1 day                Time spent on the discharge of patient: 10 minutes        Boogie Montes De Oca MD  Department of Hospital Medicine  [facility] Intensive Care

## 2023-09-02 NOTE — NURSING
Patient is now resting with no distress appearance.  NC 2 Liters placed for comfort. Patient daughter informed on the changes in patient's vital signs and Oxygen sats. She mentioned that she is expecting him to pass, because she is seeing the changes as well. I offered  reassurance .

## 2023-09-02 NOTE — NURSING
Family member Delmy is calling her  but there is no answer, she states, she will continue to call him, to come and pick her up.

## 2023-09-02 NOTE — NURSING
Patient condition is steadily declining, secure chat to Team to come and show support for the patient and his daughter, Charge Nurse was also informed on patient's condition.

## 2023-09-02 NOTE — NURSING
MD Museedi at bedside with support of family member and offering any support during this time. Patient daughter just want support that's all and request that he  naturally.

## 2023-09-02 NOTE — NURSING
Security is here to transport the body and he is Honored the [employer] White [employer] style, with his daughter following all the way downstairs with him.

## 2023-09-02 NOTE — HOSPITAL COURSE
Mr. Roy presented as transfer for acute upper GI bleeding with significant anemia. Initially Hb 5.2 on admission, received 2u PRBC. Discussed with GI and endoscopy planned for . Unfortunately, patient developed worsening of hemodynamics overnight. Discussed with family and decision was made to pursue comfort measures (had previously been on hospice care due to his metastatic prostate cancer). He  at 03:25 AM on 2023.

## 2023-09-02 NOTE — PLAN OF CARE
I was notified about the change in his clinical status of the patient.   The patient was becoming more hypotensive. His daughter on the bedside stated that he would want to be on comfort measures only and he would not want blood transfusion or aggressive resuscitation measures.   The patient passed away later on at 03:25 AM on 09/02/2023.

## 2023-09-02 NOTE — NURSING
I returned to the room to inform Mrs Brock (daughter) the Process of getting her love one ready. I informed her that I have some phone calls to make and I will return back

## 2023-09-02 NOTE — NURSING
PICC Nurse here at bedside with Consents and explaining procedure  to patients's daughter. She gave Consents

## 2023-09-02 NOTE — NURSING
Time Of Death is 0325 pronounced by MD Urrutia. Our Condolences was given and patient allowed privacy time with her Father.

## 2023-09-02 NOTE — NURSING
RT is at bedside with ENT suction patient for comfort, 2/2 to rattling of mucous in the back of his throat. Some mucous relieve him, he appeared to be in distress, pain medication was repeated. His daughter remain at bedside for comfort. Patient just remained staring in the top if the ceiling.

## 2023-09-05 LAB
BLD PROD TYP BPU: NORMAL
BLOOD UNIT EXPIRATION DATE: NORMAL
BLOOD UNIT TYPE CODE: 5100
BLOOD UNIT TYPE: NORMAL
CODING SYSTEM: NORMAL
CROSSMATCH INTERPRETATION: NORMAL
DISPENSE STATUS: NORMAL
NUM UNITS TRANS PACKED RBC: NORMAL

## 2024-04-30 NOTE — PT/OT/SLP PROGRESS
Occupational Therapy  Treatment    Maurice Roy   MRN: [id number]   Admitting Diagnosis: Spinal stenosis of lumbar region with neurogenic claudication    OT Date of Treatment: 04/02/22   AM Start Time: 1100  AM End Time: 1200  PM Start Time: na  PM End Time: na  Treatment Type: Individual 60  Total Time (min): 60 min      Billable Minutes:  Self Care/Home Management 30, Therapeutic Activity 15 and Therapeutic Exercise 15  Total Minutes: 60    General Precautions: Standard,    Orthopedic Precautions: spinal precautions  Braces:      Spiritual, Cultural Beliefs, Taoist Practices, Values that Affect Care: no    Subjective:  Communicated with nurse prior to session.    Pain/Comfort  Pain Rating 1: 5/10  Location - Side 1: Bilateral  Location - Orientation 1: lower  Location 1: back  Pain Addressed 1: Reposition, Cessation of Activity  Pain Rating Post-Intervention 1: 2/10    Objective:  Pt was cooperative and motivated without verbal encouragement while exhibiting positive affect. He participated in functional transfer retraining throughout session to / from wheelchair, toilet, and chair emphasizing fall prevention providing extra time with repetition requiring min assist secondary to steadying assist with mod verbal and tactile cueing for safety awareness and technique utilizing RW. Pt then participated in therapeutic exercise performing 5x15 seated pushups requiring decreasing lifting assist, and verbal and tactile cueing for technique challenging him to lift buttocks off seated surface to end range elbow extension in order to strengthen triceps brachii to improve performance with sit <> stand transitions. Next, he participated in ADL retraining regarding use of adaptive equipment including sock aide and reacher with LB dressing providing repetition requiring mod verbal and tactile cueing for optimal technique.       Functional Mobility:  Bed Mobility:   Supine to sit: Minimal Assistance   Sit to supine:  Moderate Assistance   Rolling: Minimal Assistance   Scooting: Minimal Assistance    Transfer Training:   Sit to stand:Minimal Assistance with Rolling Walker .  Bed <> Chair:  Step Transfer with Minimal Assistance with Rolling Walker .  Toilet Transfer:  Pt Step Transfer with Minimal Assistance with Grab bars .      Balance:   Static Sit: GOOD-: Takes MODERATE challenges from all directions but inconsistently  Dynamic Sit:  FAIR: Cannot move trunk without losing balance  Static Stand: FAIR: Maintains without assist but unable to take challenges  Dynamic stand: FAIR: Needs CONTACT GUARD during gait      Patient left up in chair with nurse notified    ASSESSMENT:  Pt demonstrated improved understanding and technique utilizing sock aide and reacher with LB dressing as noted by decreased cueing and assistance allowing him to perform LB dressing while sustaining Spinal Precautions.     Rehab potential is good    Activity tolerance: Fair    Discharge recommendations: other (see comments) (To be further determined based on progress prior to discharge.)     Equipment recommendations: other (see comments) (To be further determined based on progress prior to discharge.)     GOALS:   Multidisciplinary Problems     Occupational Therapy Goals        Problem: Occupational Therapy    Goal Priority Disciplines Outcome Interventions   Occupational Therapy Goal     OT, PT/OT     Description: Long Term Goals to be met by: 04/11/22     Patient will increase functional independence with ADLs by performing:    Feeding with [place].  UE Dressing with Modified [place].  LE Dressing with Modified [place].  Grooming while seated at sink with Modified [place].  Toileting from toilet with Modified [place] for hygiene and clothing management.   Bathing from  shower chair/bench with Modified [place].  Step transfer with Modified [place]  Toilet transfer to toilet with Modified [place].  Increased functional  strength to 4+/5 for bilateral UE's.                     Plan:  Patient to be seen 5 x/week (for 90 min per day for 14 days) to address the above listed problems via self-care/home management, community/work re-entry, therapeutic activities, therapeutic exercises  Plan of Care expires: 04/11/22  Plan of Care reviewed with: patient         04/02/2022   1 Principal Discharge DX:	Viral gastroenteritis